# Patient Record
Sex: FEMALE | Race: WHITE | NOT HISPANIC OR LATINO | Employment: OTHER | URBAN - METROPOLITAN AREA
[De-identification: names, ages, dates, MRNs, and addresses within clinical notes are randomized per-mention and may not be internally consistent; named-entity substitution may affect disease eponyms.]

---

## 2017-03-01 ENCOUNTER — APPOINTMENT (OUTPATIENT)
Dept: LAB | Facility: CLINIC | Age: 82
End: 2017-03-01
Payer: MEDICARE

## 2017-03-01 DIAGNOSIS — E11.65 TYPE 2 DIABETES MELLITUS WITH HYPERGLYCEMIA (HCC): ICD-10-CM

## 2017-03-01 LAB
ALBUMIN SERPL BCP-MCNC: 3.7 G/DL (ref 3.5–5)
ALP SERPL-CCNC: 74 U/L (ref 46–116)
ALT SERPL W P-5'-P-CCNC: 23 U/L (ref 12–78)
ANION GAP SERPL CALCULATED.3IONS-SCNC: 8 MMOL/L (ref 4–13)
AST SERPL W P-5'-P-CCNC: 11 U/L (ref 5–45)
BASOPHILS # BLD AUTO: 0.03 THOUSANDS/ΜL (ref 0–0.1)
BASOPHILS NFR BLD AUTO: 0 % (ref 0–1)
BILIRUB SERPL-MCNC: 0.51 MG/DL (ref 0.2–1)
BUN SERPL-MCNC: 21 MG/DL (ref 5–25)
CALCIUM SERPL-MCNC: 9.2 MG/DL (ref 8.3–10.1)
CHLORIDE SERPL-SCNC: 102 MMOL/L (ref 100–108)
CO2 SERPL-SCNC: 25 MMOL/L (ref 21–32)
CREAT SERPL-MCNC: 0.89 MG/DL (ref 0.6–1.3)
EOSINOPHIL # BLD AUTO: 0.17 THOUSAND/ΜL (ref 0–0.61)
EOSINOPHIL NFR BLD AUTO: 2 % (ref 0–6)
ERYTHROCYTE [DISTWIDTH] IN BLOOD BY AUTOMATED COUNT: 14.4 % (ref 11.6–15.1)
EST. AVERAGE GLUCOSE BLD GHB EST-MCNC: 223 MG/DL
GFR SERPL CREATININE-BSD FRML MDRD: >60 ML/MIN/1.73SQ M
GLUCOSE SERPL-MCNC: 275 MG/DL (ref 65–140)
HBA1C MFR BLD: 9.4 % (ref 4.2–6.3)
HCT VFR BLD AUTO: 40.3 % (ref 34.8–46.1)
HGB BLD-MCNC: 13.7 G/DL (ref 11.5–15.4)
LDLC SERPL DIRECT ASSAY-MCNC: 191 MG/DL (ref 0–100)
LYMPHOCYTES # BLD AUTO: 1.61 THOUSANDS/ΜL (ref 0.6–4.47)
LYMPHOCYTES NFR BLD AUTO: 22 % (ref 14–44)
MCH RBC QN AUTO: 29.8 PG (ref 26.8–34.3)
MCHC RBC AUTO-ENTMCNC: 34 G/DL (ref 31.4–37.4)
MCV RBC AUTO: 88 FL (ref 82–98)
MONOCYTES # BLD AUTO: 0.67 THOUSAND/ΜL (ref 0.17–1.22)
MONOCYTES NFR BLD AUTO: 9 % (ref 4–12)
NEUTROPHILS # BLD AUTO: 4.86 THOUSANDS/ΜL (ref 1.85–7.62)
NEUTS SEG NFR BLD AUTO: 67 % (ref 43–75)
NRBC BLD AUTO-RTO: 0 /100 WBCS
PLATELET # BLD AUTO: 224 THOUSANDS/UL (ref 149–390)
PMV BLD AUTO: 11.4 FL (ref 8.9–12.7)
POTASSIUM SERPL-SCNC: 4.1 MMOL/L (ref 3.5–5.3)
PROT SERPL-MCNC: 7.1 G/DL (ref 6.4–8.2)
RBC # BLD AUTO: 4.6 MILLION/UL (ref 3.81–5.12)
SODIUM SERPL-SCNC: 135 MMOL/L (ref 136–145)
T4 FREE SERPL-MCNC: 1.14 NG/DL (ref 0.76–1.46)
TRIGL SERPL-MCNC: 198 MG/DL
TSH SERPL DL<=0.05 MIU/L-ACNC: 4.95 UIU/ML (ref 0.36–3.74)
WBC # BLD AUTO: 7.36 THOUSAND/UL (ref 4.31–10.16)

## 2017-03-01 PROCEDURE — 84478 ASSAY OF TRIGLYCERIDES: CPT

## 2017-03-01 PROCEDURE — 36415 COLL VENOUS BLD VENIPUNCTURE: CPT

## 2017-03-01 PROCEDURE — 83721 ASSAY OF BLOOD LIPOPROTEIN: CPT

## 2017-03-01 PROCEDURE — 80053 COMPREHEN METABOLIC PANEL: CPT

## 2017-03-01 PROCEDURE — 85025 COMPLETE CBC W/AUTO DIFF WBC: CPT

## 2017-03-01 PROCEDURE — 84439 ASSAY OF FREE THYROXINE: CPT

## 2017-03-01 PROCEDURE — 83036 HEMOGLOBIN GLYCOSYLATED A1C: CPT

## 2017-03-01 PROCEDURE — 84443 ASSAY THYROID STIM HORMONE: CPT

## 2017-03-02 ENCOUNTER — TRANSCRIBE ORDERS (OUTPATIENT)
Dept: MRI IMAGING | Facility: CLINIC | Age: 82
End: 2017-03-02

## 2017-03-02 ENCOUNTER — APPOINTMENT (OUTPATIENT)
Dept: LAB | Facility: CLINIC | Age: 82
End: 2017-03-02
Payer: MEDICARE

## 2017-03-02 DIAGNOSIS — E11.00 UNCONTROLLED TYPE 2 DIABETES MELLITUS WITH HYPEROSMOLARITY WITHOUT COMA, WITHOUT LONG-TERM CURRENT USE OF INSULIN (HCC): Primary | ICD-10-CM

## 2017-03-02 LAB
BACTERIA UR QL AUTO: ABNORMAL /HPF
BILIRUB UR QL STRIP: NEGATIVE
CLARITY UR: CLEAR
COLOR UR: YELLOW
GLUCOSE UR STRIP-MCNC: NEGATIVE MG/DL
HGB UR QL STRIP.AUTO: NEGATIVE
HYALINE CASTS #/AREA URNS LPF: ABNORMAL /LPF
KETONES UR STRIP-MCNC: NEGATIVE MG/DL
LEUKOCYTE ESTERASE UR QL STRIP: ABNORMAL
NITRITE UR QL STRIP: POSITIVE
NON-SQ EPI CELLS URNS QL MICRO: ABNORMAL /HPF
PH UR STRIP.AUTO: 6 [PH] (ref 4.5–8)
PROT UR STRIP-MCNC: NEGATIVE MG/DL
RBC #/AREA URNS AUTO: ABNORMAL /HPF
SP GR UR STRIP.AUTO: 1.01 (ref 1–1.03)
UROBILINOGEN UR QL STRIP.AUTO: 0.2 E.U./DL
WBC #/AREA URNS AUTO: ABNORMAL /HPF

## 2017-03-02 PROCEDURE — 81001 URINALYSIS AUTO W/SCOPE: CPT | Performed by: INTERNAL MEDICINE

## 2017-03-03 ENCOUNTER — ALLSCRIPTS OFFICE VISIT (OUTPATIENT)
Dept: OTHER | Facility: OTHER | Age: 82
End: 2017-03-03

## 2017-04-25 ENCOUNTER — ALLSCRIPTS OFFICE VISIT (OUTPATIENT)
Dept: OTHER | Facility: OTHER | Age: 82
End: 2017-04-25

## 2017-04-25 ENCOUNTER — APPOINTMENT (OUTPATIENT)
Dept: LAB | Facility: CLINIC | Age: 82
End: 2017-04-25
Payer: MEDICARE

## 2017-04-25 DIAGNOSIS — E78.5 HYPERLIPIDEMIA: ICD-10-CM

## 2017-04-25 LAB
ANION GAP SERPL CALCULATED.3IONS-SCNC: 8 MMOL/L (ref 4–13)
BACTERIA UR QL AUTO: ABNORMAL /HPF
BASOPHILS # BLD AUTO: 0.04 THOUSANDS/ΜL (ref 0–0.1)
BASOPHILS NFR BLD AUTO: 0 % (ref 0–1)
BILIRUB UR QL STRIP: NEGATIVE
BUN SERPL-MCNC: 17 MG/DL (ref 5–25)
CALCIUM SERPL-MCNC: 9.7 MG/DL (ref 8.3–10.1)
CHLORIDE SERPL-SCNC: 101 MMOL/L (ref 100–108)
CLARITY UR: ABNORMAL
CO2 SERPL-SCNC: 28 MMOL/L (ref 21–32)
COLOR UR: YELLOW
CREAT SERPL-MCNC: 0.91 MG/DL (ref 0.6–1.3)
EOSINOPHIL # BLD AUTO: 0.23 THOUSAND/ΜL (ref 0–0.61)
EOSINOPHIL NFR BLD AUTO: 2 % (ref 0–6)
ERYTHROCYTE [DISTWIDTH] IN BLOOD BY AUTOMATED COUNT: 14.5 % (ref 11.6–15.1)
GFR SERPL CREATININE-BSD FRML MDRD: 58.6 ML/MIN/1.73SQ M
GLUCOSE P FAST SERPL-MCNC: 236 MG/DL (ref 65–99)
GLUCOSE UR STRIP-MCNC: ABNORMAL MG/DL
HCT VFR BLD AUTO: 38.5 % (ref 34.8–46.1)
HGB BLD-MCNC: 12.3 G/DL (ref 11.5–15.4)
HGB UR QL STRIP.AUTO: NEGATIVE
HYALINE CASTS #/AREA URNS LPF: ABNORMAL /LPF
KETONES UR STRIP-MCNC: NEGATIVE MG/DL
LEUKOCYTE ESTERASE UR QL STRIP: ABNORMAL
LYMPHOCYTES # BLD AUTO: 2.22 THOUSANDS/ΜL (ref 0.6–4.47)
LYMPHOCYTES NFR BLD AUTO: 23 % (ref 14–44)
MCH RBC QN AUTO: 28.4 PG (ref 26.8–34.3)
MCHC RBC AUTO-ENTMCNC: 31.9 G/DL (ref 31.4–37.4)
MCV RBC AUTO: 89 FL (ref 82–98)
MONOCYTES # BLD AUTO: 0.86 THOUSAND/ΜL (ref 0.17–1.22)
MONOCYTES NFR BLD AUTO: 9 % (ref 4–12)
NEUTROPHILS # BLD AUTO: 6.49 THOUSANDS/ΜL (ref 1.85–7.62)
NEUTS SEG NFR BLD AUTO: 66 % (ref 43–75)
NITRITE UR QL STRIP: NEGATIVE
NON-SQ EPI CELLS URNS QL MICRO: ABNORMAL /HPF
NRBC BLD AUTO-RTO: 0 /100 WBCS
PH UR STRIP.AUTO: 6.5 [PH] (ref 4.5–8)
PLATELET # BLD AUTO: 257 THOUSANDS/UL (ref 149–390)
PMV BLD AUTO: 11.6 FL (ref 8.9–12.7)
POTASSIUM SERPL-SCNC: 4.3 MMOL/L (ref 3.5–5.3)
PROT UR STRIP-MCNC: NEGATIVE MG/DL
RBC # BLD AUTO: 4.33 MILLION/UL (ref 3.81–5.12)
RBC #/AREA URNS AUTO: ABNORMAL /HPF
SODIUM SERPL-SCNC: 137 MMOL/L (ref 136–145)
SP GR UR STRIP.AUTO: 1.01 (ref 1–1.03)
TSH SERPL DL<=0.05 MIU/L-ACNC: 2.3 UIU/ML (ref 0.36–3.74)
UROBILINOGEN UR QL STRIP.AUTO: 0.2 E.U./DL
WBC # BLD AUTO: 9.86 THOUSAND/UL (ref 4.31–10.16)
WBC #/AREA URNS AUTO: ABNORMAL /HPF

## 2017-04-25 PROCEDURE — 85025 COMPLETE CBC W/AUTO DIFF WBC: CPT

## 2017-04-25 PROCEDURE — 80048 BASIC METABOLIC PNL TOTAL CA: CPT

## 2017-04-25 PROCEDURE — 36415 COLL VENOUS BLD VENIPUNCTURE: CPT

## 2017-04-25 PROCEDURE — 87086 URINE CULTURE/COLONY COUNT: CPT

## 2017-04-25 PROCEDURE — 84443 ASSAY THYROID STIM HORMONE: CPT

## 2017-04-25 PROCEDURE — 87186 SC STD MICRODIL/AGAR DIL: CPT

## 2017-04-25 PROCEDURE — 81001 URINALYSIS AUTO W/SCOPE: CPT

## 2017-04-25 PROCEDURE — 87077 CULTURE AEROBIC IDENTIFY: CPT

## 2017-04-27 LAB — BACTERIA UR CULT: NORMAL

## 2017-05-04 ENCOUNTER — GENERIC CONVERSION - ENCOUNTER (OUTPATIENT)
Dept: OTHER | Facility: OTHER | Age: 82
End: 2017-05-04

## 2017-06-22 ENCOUNTER — GENERIC CONVERSION - ENCOUNTER (OUTPATIENT)
Dept: OTHER | Facility: OTHER | Age: 82
End: 2017-06-22

## 2017-06-22 ENCOUNTER — ALLSCRIPTS OFFICE VISIT (OUTPATIENT)
Dept: OTHER | Facility: OTHER | Age: 82
End: 2017-06-22

## 2017-06-22 ENCOUNTER — APPOINTMENT (OUTPATIENT)
Dept: LAB | Facility: CLINIC | Age: 82
End: 2017-06-22
Payer: MEDICARE

## 2017-06-22 DIAGNOSIS — E11.65 TYPE 2 DIABETES MELLITUS WITH HYPERGLYCEMIA (HCC): ICD-10-CM

## 2017-06-22 LAB
ANION GAP SERPL CALCULATED.3IONS-SCNC: 9 MMOL/L (ref 4–13)
BUN SERPL-MCNC: 18 MG/DL (ref 5–25)
CALCIUM SERPL-MCNC: 9.4 MG/DL (ref 8.3–10.1)
CHLORIDE SERPL-SCNC: 103 MMOL/L (ref 100–108)
CO2 SERPL-SCNC: 25 MMOL/L (ref 21–32)
CREAT SERPL-MCNC: 0.84 MG/DL (ref 0.6–1.3)
EST. AVERAGE GLUCOSE BLD GHB EST-MCNC: 166 MG/DL
GFR SERPL CREATININE-BSD FRML MDRD: >60 ML/MIN/1.73SQ M
GLUCOSE P FAST SERPL-MCNC: 127 MG/DL (ref 65–99)
HBA1C MFR BLD: 7.4 % (ref 4.2–6.3)
POTASSIUM SERPL-SCNC: 3.8 MMOL/L (ref 3.5–5.3)
SODIUM SERPL-SCNC: 137 MMOL/L (ref 136–145)

## 2017-06-22 PROCEDURE — 80048 BASIC METABOLIC PNL TOTAL CA: CPT

## 2017-06-22 PROCEDURE — 83036 HEMOGLOBIN GLYCOSYLATED A1C: CPT

## 2017-06-22 PROCEDURE — 36415 COLL VENOUS BLD VENIPUNCTURE: CPT

## 2017-10-12 ENCOUNTER — GENERIC CONVERSION - ENCOUNTER (OUTPATIENT)
Dept: OTHER | Facility: OTHER | Age: 82
End: 2017-10-12

## 2017-10-12 ENCOUNTER — TRANSCRIBE ORDERS (OUTPATIENT)
Dept: LAB | Facility: CLINIC | Age: 82
End: 2017-10-12

## 2017-10-12 ENCOUNTER — APPOINTMENT (OUTPATIENT)
Dept: LAB | Facility: CLINIC | Age: 82
End: 2017-10-12
Payer: MEDICARE

## 2017-10-12 DIAGNOSIS — I10 ESSENTIAL (PRIMARY) HYPERTENSION: ICD-10-CM

## 2017-10-12 DIAGNOSIS — E11.65 TYPE 2 DIABETES MELLITUS WITH HYPERGLYCEMIA (HCC): ICD-10-CM

## 2017-10-12 LAB
ANION GAP SERPL CALCULATED.3IONS-SCNC: 8 MMOL/L (ref 4–13)
BUN SERPL-MCNC: 12 MG/DL (ref 5–25)
CALCIUM SERPL-MCNC: 9.6 MG/DL (ref 8.3–10.1)
CHLORIDE SERPL-SCNC: 104 MMOL/L (ref 100–108)
CO2 SERPL-SCNC: 27 MMOL/L (ref 21–32)
CREAT SERPL-MCNC: 1 MG/DL (ref 0.6–1.3)
EST. AVERAGE GLUCOSE BLD GHB EST-MCNC: 237 MG/DL
GFR SERPL CREATININE-BSD FRML MDRD: 51 ML/MIN/1.73SQ M
GLUCOSE P FAST SERPL-MCNC: 126 MG/DL (ref 65–99)
HBA1C MFR BLD HPLC: 9.9 %
HBA1C MFR BLD: 9.9 % (ref 4.2–6.3)
POTASSIUM SERPL-SCNC: 4.2 MMOL/L (ref 3.5–5.3)
SODIUM SERPL-SCNC: 139 MMOL/L (ref 136–145)

## 2017-10-12 PROCEDURE — 80048 BASIC METABOLIC PNL TOTAL CA: CPT

## 2017-10-12 PROCEDURE — 83036 HEMOGLOBIN GLYCOSYLATED A1C: CPT

## 2017-10-12 PROCEDURE — 36415 COLL VENOUS BLD VENIPUNCTURE: CPT

## 2018-01-10 NOTE — MISCELLANEOUS
To Whom It May Concern,    The above patient suffers from Alzheimer's dementia  Although physically well, she has mental deficits that make it impossible for her to handle her financial  affairs  My recommendation is that her son, Milan Sharma, be appointed her legal guardian, and be given responsibility for control of her financial affairs  Sincerely,          USAMA Triana  Electronically signed Yessica KU    Jan 29 2016  9:16AM EST

## 2018-01-10 NOTE — RESULT NOTES
Verified Results  (1) BASIC METABOLIC PROFILE [id number] 10:29AM [facility]   TW Order Number: [id number]     Test Name Result Flag Reference   SODIUM 139 mmol/L  136-145   POTASSIUM 4 2 mmol/L  3 5-5 3   CHLORIDE 104 mmol/L  100-108   CARBON DIOXIDE 27 mmol/L  21-32   ANION GAP (CALC) 8 mmol/L  4-13   BLOOD UREA NITROGEN 12 mg/dL  5-25   CREATININE 1 00 mg/dL  0 60-1 30   Standardized to IDMS reference method   CALCIUM 9 6 mg/dL  8 3-10 1   eGFR 51 ml/min/1 73sq m     National Kidney Disease Education Program recommendations are as follows:  GFR calculation is accurate only with a steady state creatinine  Chronic Kidney disease less than 60 ml/min/1 73 sq  meters  Kidney failure less than 15 ml/min/1 73 sq  meters  GLUCOSE FASTING 126 mg/dL H 65-99   Specimen collection should occur prior to Sulfasalazine administration due to the potential for falsely depressed results  Specimen collection should occur prior to Sulfapyridine administration due to the potential for falsely elevated results  (1) HEMOGLOBIN A1C 12Oct2017 10:29AM [facility]   TW Order Number: [id number]     Test Name Result Flag Reference   HEMOGLOBIN A1C 9 9 % H 4 2-6 3   EST  AVG   GLUCOSE 237 mg/dl

## 2018-01-13 VITALS
BODY MASS INDEX: 30.88 KG/M2 | SYSTOLIC BLOOD PRESSURE: 130 MMHG | HEART RATE: 88 BPM | DIASTOLIC BLOOD PRESSURE: 78 MMHG | WEIGHT: 158.13 LBS

## 2018-01-13 NOTE — PROGRESS NOTES
Assessment    1  Diabetes mellitus type 2, uncontrolled (250 02) (E11 65)   2  Hyperlipidemia (272 4) (E78 5)   3  Hypertension (401 9) (I10)   4  Dementia (294 20) (F03 90)    Plan  Dementia, Diabetes mellitus type 2, uncontrolled, Face lesion    · Donepezil HCl - 10 MG Oral Tablet; TAKE 1 TABLET DAILY    Discussion/Summary  Discussion Summary:   Regarding your specific issues:  Blood pressure and blood sugar are decent is not perfect, and given the dementia, and behavioral problems, I don't think we should push this any further  Memory loss is not as severe as we thought it would be but you should be taking donepezil 10 mg once a day, not 5 mg  Followup in August  Given the worsening dementia I do recommend that her son assume soon control of her financial affairs     History of Present Illness  HPI: This 26-year-old lady keeps disappearing from followup  She was brought in last week for reassessment; She is diabeticl  She takes insulin twice a day generally 50 units in the morning and 30 units in the evening of 7525 mix on metformin  Blood pressure is controlled and stable  Lipids are treated  Memory loss does not seem that severe; she is on Aricept 10 mg and her mitral cognitive assessment was 25  Blood glucose log showed many entries greater than 300 but hemoglobin A1c although high at 8 8% is down from a prior 9 8%  Blood pressure is okay but not great  Declining cognitive function seems to be       Hypertension (Follow-Up): The patient presents for follow-up of primary hypertension  The patient states she has been stable with her blood pressure control since the last visit  She has no comorbid illnesses  She has no significant interval events  Symptoms: The patient is currently asymptomatic  Associated symptoms include no headache  Blood pressure control has been good  Medications: the patient is adherent with her medication regimen  the patient complains of medication side effects  Hyperlipidemia (Follow-Up): The patient states her hyperlipidemia has been under good control since the last visit  Comorbid Illnesses: diabetes mellitus and hypertension  Symptoms: The patient is currently asymptomatic  Medications: the patient is adherent with her medication regimen  Diabetes Type II (Follow-Up): The patient states she has been stable with her Type II Diabetes control since the last visit  Comorbid Illnesses: hypertension and hyperlipidemia  She has no known diabetic complications  Interval Events: She was evaluated during a hospitalization  She presented with Change in mental status  The evaluation included Admitted to the hospital for DKA  Treatment included Treatment of DKA  She has no significant interval events  Symptoms:   Home monitoring: The patient checks her blood sugars regularly  Glycemic control has been poor  Medications: the patient is adherent with her medication regimen  She denies medication side effects  Additional History: Hemoglobin A1c 8 8%  Memory Loss: The patient is being seen for a routine clinic follow-up of memory loss  The history is reported by the patient  Symptoms:  awareness of the memory impairment    The patient presents with complaints of mild memory loss  Symptoms are improving  No associated symptoms are reported  Current treatment includes memory aids  By report, there is good compliance with treatment, good tolerance of treatment and poor symptom control  She was previously evaluated during a hospitalization  Previous presentation included confusion  Past evaluation has included head CT  Review of Systems  Complete-Female:   Constitutional: no fever and no chills  Eyes: eyesight problems  ENT: no nasal discharge  Cardiovascular: no chest pain and no palpitations  Respiratory: no cough  Gastrointestinal: no abdominal pain, no nausea, no vomiting and no diarrhea  Genitourinary: no dysuria     Musculoskeletal: arthralgias  Integumentary: no rashes and no itching  Neurological: no headache and no fainting  Psychiatric: anxiety and depression  Hematologic/Lymphatic: no tendency for easy bleeding and no tendency for easy bruising  Active Problems    1  Actinic keratosis (702 0) (L57 0)   2  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)   3  Dementia (294 20) (F03 90)   4  Diabetes mellitus type 2, uncontrolled (250 02) (E11 65)   5  Face lesion (709 9) (L98 9)   6  Flu vaccine need (V04 81) (Z23)   7  H/O nonmelanoma skin cancer (V10 83) (Z85 828)   8  Hyperlipidemia (272 4) (E78 5)   9  Hypertension (401 9) (I10)   10  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)   11  Need for influenza vaccination (V04 81) (Z23)   12  Seborrheic keratosis (702 19) (L82 1)   13  Skin condition screening (V82 0) (Z13 89)   14  Squamous cell cancer of skin of left cheek (173 32) (C44 329)   15  Syncope, unspecified syncope type (780 2) (R55)   16  Transition of care    Past Medical History    1  History of Congestive heart failure (428 0) (I50 9)   2  History of Encounter for screening mammogram for malignant neoplasm of breast (V76 12) (Z12 31)   3  H/O nonmelanoma skin cancer (V10 83) (E01 967)   4  History of aortic valve disorder (V12 59) (Z86 79)   5  History of dermatitis (V13 3) (Z87 2)   6  History of edema (V13 89) (Z87 898)   7  History of shortness of breath (V13 89) (S81 656)    Surgical History    1  History of Bladder Surgery   2  History of Breast Surgery Mastectomy   3  History of Cataract Surgery   4  History of Knee Replacement  Surgical History Reviewed: The surgical history was reviewed and updated today  Family History    1  No pertinent family history    2  No pertinent family history  Family History Reviewed: The family history was reviewed and updated today         Social History    · Denied: History of Alcohol Use (History)   · Denied: History of Drug Use   · Never a smoker   · Tobacco Non-user  Social History Reviewed: The social history was reviewed and updated today  Current Meds   1  BD Insulin Syr Ultrafine II 31G X 5/16" 0 5 ML Miscellaneous; Therapy: 72BAQ0120 to (Evaluate:30May2014) Recorded   2  Donepezil HCl - 10 MG Oral Tablet; TAKE 1 TABLET DAILY; Therapy: 00ZTE2062 to (Evaluate:95Wec7463)  Requested for: 61QQW9892; Last Rx:85Ptr7728 Ordered   3  FreeStyle Lancets Miscellaneous; TEST 3 TIMES A DAY  02; Therapy: 90GTR0699 to (Last Rx:28Zek3770)  Requested for: 32KCZ0952 Ordered   4  FreeStyle Lite Test In Vitro Strip; TEST 3 TIMES A DAY; Therapy: 96CJB6655 to (Last Rx:87Ica1441)  Requested for: 48SAW8982 Ordered   5  HumaLOG Mix 75/25 (75-25) 100 UNIT/ML Subcutaneous Suspension; 50 units in the morning, and   30 units before dinner; Therapy: 42MJX3387 to (Evaluate:12Jan2016); Last Rx:14Oct2015 Ordered   6  Letrozole 2 5 MG Oral Tablet; Take 1 tablet daily; Therapy: 70Rwr2401-(Evaluate:68Yzi2170) Recorded   7  Lipitor 80 MG Oral Tablet; Take 1 tablet daily  Requested for: 09Sep2015; Last Rx:09Sep2015   Ordered   8  Losartan Potassium-HCTZ 100-25 MG Oral Tablet; Take 1 tablet daily  Requested for: 09Sep2015; Last Rx:40Lhw6172 Ordered   9  MetFORMIN HCl - 1000 MG Oral Tablet; Take 1 tablet twice daily Recorded   10  Multivitamins Oral Capsule; Therapy: (Recorded:25Mar2014) to Recorded   11  Vasolex OINT; APPLY DAILY/PRN  Requested for: 73Gkr8282; Last Rx:17Axd4371 Ordered  Medication List Reviewed: The medication list was reviewed and updated today  Allergies    1  Nitrofurantoin Macrocrystal CAPS    Vitals  Vital Signs [Data Includes: Current Encounter]    Recorded: 16AOE0661 09:20AM Recorded: 76EXD9513 09:00AM   Heart Rate  80   Systolic 099 378   Diastolic 82 75   Height  5 ft    Weight  160 lb 6 oz   BMI Calculated  31 32   BSA Calculated  1 7     Physical Exam    Constitutional   General appearance: No acute distress, well appearing and well nourished      Eyes Conjunctiva and lids: No swelling, erythema or discharge  Pupils and irises: Equal, round and reactive to light  Ears, Nose, Mouth, and Throat   External inspection of ears and nose: Normal     Otoscopic examination: Tympanic membranes translucent with normal light reflex  Canals patent without erythema  Nasal mucosa, septum, and turbinates: Normal without edema or erythema  Oropharynx: Normal with no erythema, edema, exudate or lesions  Pulmonary   Respiratory effort: No increased work of breathing or signs of respiratory distress  Auscultation of lungs: Clear to auscultation  Cardiovascular   Auscultation of heart: Normal rate and rhythm, normal S1 and S2, without murmurs  Examination of extremities for edema and/or varicosities: Normal     Carotid pulses: Normal     Abdomen   Abdomen: Non-tender, no masses  Liver and spleen: No hepatomegaly or splenomegaly  Lymphatic   Palpation of lymph nodes in neck: No lymphadenopathy  Musculoskeletal   Gait and station: Normal     Digits and nails: Normal without clubbing or cyanosis  Inspection/palpation of joints, bones, and muscles: Normal     Skin   Skin and subcutaneous tissue: Normal without rashes or lesions  Neurologic   Cranial nerves: Cranial nerves 2-12 intact  Reflexes: 2+ and symmetric  Sensation: No sensory loss  Psychiatric   Orientation to person, place, and time: Normal     Mood and affect: Abnormal   Mood and Affect: appropriate mood and quiet          Results/Data  Encounter Results   *VB-Urinary Incontinence Screen (Dx V81 6 Screen for UI) 86CQA3082 12:00AM Lior Maravilla     Test Name Result Flag Reference   Urinary Incontinence Assessment 32URF9189         Signatures   Electronically signed by : USAMA Velasquez ; Jan 29 2016  9:20AM EST                       (Author)

## 2018-01-22 VITALS
TEMPERATURE: 97.6 F | HEART RATE: 103 BPM | WEIGHT: 153 LBS | DIASTOLIC BLOOD PRESSURE: 70 MMHG | OXYGEN SATURATION: 93 % | SYSTOLIC BLOOD PRESSURE: 110 MMHG | BODY MASS INDEX: 29.88 KG/M2

## 2018-01-22 VITALS
WEIGHT: 155.13 LBS | HEART RATE: 102 BPM | BODY MASS INDEX: 30.46 KG/M2 | OXYGEN SATURATION: 96 % | HEIGHT: 60 IN | SYSTOLIC BLOOD PRESSURE: 132 MMHG | DIASTOLIC BLOOD PRESSURE: 78 MMHG

## 2018-02-17 DIAGNOSIS — G30.1 LATE ONSET ALZHEIMER'S DISEASE WITH BEHAVIORAL DISTURBANCE (HCC): Primary | ICD-10-CM

## 2018-02-17 DIAGNOSIS — F02.81 LATE ONSET ALZHEIMER'S DISEASE WITH BEHAVIORAL DISTURBANCE (HCC): Primary | ICD-10-CM

## 2018-02-17 RX ORDER — DONEPEZIL HYDROCHLORIDE 10 MG/1
TABLET, FILM COATED ORAL
Qty: 90 TABLET | Refills: 3 | Status: SHIPPED | OUTPATIENT
Start: 2018-02-17 | End: 2018-03-08 | Stop reason: SDUPTHER

## 2018-03-08 DIAGNOSIS — E11.8 TYPE 2 DIABETES MELLITUS WITH COMPLICATION, WITH LONG-TERM CURRENT USE OF INSULIN (HCC): Primary | ICD-10-CM

## 2018-03-08 DIAGNOSIS — Z79.4 TYPE 2 DIABETES MELLITUS WITH COMPLICATION, WITH LONG-TERM CURRENT USE OF INSULIN (HCC): Primary | ICD-10-CM

## 2018-03-08 DIAGNOSIS — F02.81 LATE ONSET ALZHEIMER'S DISEASE WITH BEHAVIORAL DISTURBANCE (HCC): ICD-10-CM

## 2018-03-08 DIAGNOSIS — G30.1 LATE ONSET ALZHEIMER'S DISEASE WITH BEHAVIORAL DISTURBANCE (HCC): ICD-10-CM

## 2018-03-08 RX ORDER — DONEPEZIL HYDROCHLORIDE 10 MG/1
10 TABLET, FILM COATED ORAL DAILY
Qty: 90 TABLET | Refills: 0 | Status: SHIPPED | OUTPATIENT
Start: 2018-03-08 | End: 2018-06-05 | Stop reason: SDUPTHER

## 2018-03-31 DIAGNOSIS — E11.8 TYPE 2 DIABETES MELLITUS WITH COMPLICATION, WITH LONG-TERM CURRENT USE OF INSULIN (HCC): Primary | ICD-10-CM

## 2018-03-31 DIAGNOSIS — E11.8 TYPE 2 DIABETES MELLITUS WITH COMPLICATION, WITH LONG-TERM CURRENT USE OF INSULIN (HCC): ICD-10-CM

## 2018-03-31 DIAGNOSIS — Z79.4 TYPE 2 DIABETES MELLITUS WITH COMPLICATION, WITH LONG-TERM CURRENT USE OF INSULIN (HCC): Primary | ICD-10-CM

## 2018-03-31 DIAGNOSIS — Z79.4 TYPE 2 DIABETES MELLITUS WITH COMPLICATION, WITH LONG-TERM CURRENT USE OF INSULIN (HCC): ICD-10-CM

## 2018-04-02 RX ORDER — SYRINGE-NEEDLE,INSULIN,0.5 ML 31 GX5/16"
SYRINGE, EMPTY DISPOSABLE MISCELLANEOUS
Qty: 200 EACH | Refills: 2 | Status: SHIPPED | OUTPATIENT
Start: 2018-04-02 | End: 2019-09-11 | Stop reason: SDUPTHER

## 2018-04-30 DIAGNOSIS — Z79.4 TYPE 2 DIABETES MELLITUS WITH COMPLICATION, WITH LONG-TERM CURRENT USE OF INSULIN (HCC): ICD-10-CM

## 2018-04-30 DIAGNOSIS — E11.8 TYPE 2 DIABETES MELLITUS WITH COMPLICATION, WITH LONG-TERM CURRENT USE OF INSULIN (HCC): ICD-10-CM

## 2018-05-10 RX ORDER — LANCETS 28 GAUGE
EACH MISCELLANEOUS
COMMUNITY
Start: 2015-09-09

## 2018-05-10 RX ORDER — BLOOD SUGAR DIAGNOSTIC
STRIP MISCELLANEOUS
COMMUNITY
Start: 2013-08-09

## 2018-05-12 ENCOUNTER — OFFICE VISIT (OUTPATIENT)
Dept: INTERNAL MEDICINE CLINIC | Facility: CLINIC | Age: 83
End: 2018-05-12
Payer: MEDICARE

## 2018-05-12 ENCOUNTER — APPOINTMENT (OUTPATIENT)
Dept: LAB | Facility: CLINIC | Age: 83
End: 2018-05-12
Payer: MEDICARE

## 2018-05-12 VITALS
WEIGHT: 143 LBS | SYSTOLIC BLOOD PRESSURE: 140 MMHG | HEART RATE: 102 BPM | OXYGEN SATURATION: 97 % | RESPIRATION RATE: 18 BRPM | DIASTOLIC BLOOD PRESSURE: 90 MMHG | BODY MASS INDEX: 28.07 KG/M2 | HEIGHT: 60 IN

## 2018-05-12 DIAGNOSIS — Z79.4 TYPE 2 DIABETES MELLITUS WITHOUT COMPLICATION, WITH LONG-TERM CURRENT USE OF INSULIN (HCC): Primary | ICD-10-CM

## 2018-05-12 DIAGNOSIS — I50.32 CHRONIC DIASTOLIC CONGESTIVE HEART FAILURE (HCC): ICD-10-CM

## 2018-05-12 DIAGNOSIS — I35.9 AORTIC VALVE DISORDER: ICD-10-CM

## 2018-05-12 DIAGNOSIS — E11.9 TYPE 2 DIABETES MELLITUS WITHOUT COMPLICATION, WITH LONG-TERM CURRENT USE OF INSULIN (HCC): ICD-10-CM

## 2018-05-12 DIAGNOSIS — E11.9 TYPE 2 DIABETES MELLITUS WITHOUT COMPLICATION, WITH LONG-TERM CURRENT USE OF INSULIN (HCC): Primary | ICD-10-CM

## 2018-05-12 DIAGNOSIS — Z79.4 TYPE 2 DIABETES MELLITUS WITHOUT COMPLICATION, WITH LONG-TERM CURRENT USE OF INSULIN (HCC): ICD-10-CM

## 2018-05-12 PROBLEM — I50.9 CHF (CONGESTIVE HEART FAILURE) (HCC): Status: ACTIVE | Noted: 2018-05-12

## 2018-05-12 LAB
ALBUMIN SERPL BCP-MCNC: 3.7 G/DL (ref 3.5–5)
ALP SERPL-CCNC: 82 U/L (ref 46–116)
ALT SERPL W P-5'-P-CCNC: 22 U/L (ref 12–78)
ANION GAP SERPL CALCULATED.3IONS-SCNC: 8 MMOL/L (ref 4–13)
AST SERPL W P-5'-P-CCNC: 13 U/L (ref 5–45)
BACTERIA UR QL AUTO: ABNORMAL /HPF
BASOPHILS # BLD AUTO: 0.03 THOUSANDS/ΜL (ref 0–0.1)
BASOPHILS NFR BLD AUTO: 0 % (ref 0–1)
BILIRUB SERPL-MCNC: 0.57 MG/DL (ref 0.2–1)
BILIRUB UR QL STRIP: NEGATIVE
BUN SERPL-MCNC: 14 MG/DL (ref 5–25)
CALCIUM SERPL-MCNC: 9.4 MG/DL (ref 8.3–10.1)
CHLORIDE SERPL-SCNC: 96 MMOL/L (ref 100–108)
CHOLEST SERPL-MCNC: 266 MG/DL (ref 50–200)
CLARITY UR: CLEAR
CO2 SERPL-SCNC: 28 MMOL/L (ref 21–32)
COLOR UR: YELLOW
CREAT SERPL-MCNC: 0.94 MG/DL (ref 0.6–1.3)
CREAT UR-MCNC: 34.7 MG/DL
EOSINOPHIL # BLD AUTO: 0.12 THOUSAND/ΜL (ref 0–0.61)
EOSINOPHIL NFR BLD AUTO: 2 % (ref 0–6)
ERYTHROCYTE [DISTWIDTH] IN BLOOD BY AUTOMATED COUNT: 13.8 % (ref 11.6–15.1)
EST. AVERAGE GLUCOSE BLD GHB EST-MCNC: 255 MG/DL
GFR SERPL CREATININE-BSD FRML MDRD: 55 ML/MIN/1.73SQ M
GLUCOSE P FAST SERPL-MCNC: 382 MG/DL (ref 65–99)
GLUCOSE UR STRIP-MCNC: ABNORMAL MG/DL
HBA1C MFR BLD: 10.5 % (ref 4.2–6.3)
HCT VFR BLD AUTO: 43.1 % (ref 34.8–46.1)
HDLC SERPL-MCNC: 67 MG/DL (ref 40–60)
HGB BLD-MCNC: 13.5 G/DL (ref 11.5–15.4)
HGB UR QL STRIP.AUTO: ABNORMAL
HYALINE CASTS #/AREA URNS LPF: ABNORMAL /LPF
KETONES UR STRIP-MCNC: ABNORMAL MG/DL
LDLC SERPL CALC-MCNC: 147 MG/DL (ref 0–100)
LEUKOCYTE ESTERASE UR QL STRIP: ABNORMAL
LYMPHOCYTES # BLD AUTO: 1.41 THOUSANDS/ΜL (ref 0.6–4.47)
LYMPHOCYTES NFR BLD AUTO: 19 % (ref 14–44)
MCH RBC QN AUTO: 28.3 PG (ref 26.8–34.3)
MCHC RBC AUTO-ENTMCNC: 31.3 G/DL (ref 31.4–37.4)
MCV RBC AUTO: 90 FL (ref 82–98)
MICROALBUMIN UR-MCNC: 42 MG/L (ref 0–20)
MICROALBUMIN/CREAT 24H UR: 121 MG/G CREATININE (ref 0–30)
MONOCYTES # BLD AUTO: 0.6 THOUSAND/ΜL (ref 0.17–1.22)
MONOCYTES NFR BLD AUTO: 8 % (ref 4–12)
NEUTROPHILS # BLD AUTO: 5.16 THOUSANDS/ΜL (ref 1.85–7.62)
NEUTS SEG NFR BLD AUTO: 71 % (ref 43–75)
NITRITE UR QL STRIP: NEGATIVE
NON-SQ EPI CELLS URNS QL MICRO: ABNORMAL /HPF
NRBC BLD AUTO-RTO: 0 /100 WBCS
NT-PROBNP SERPL-MCNC: 166 PG/ML
PH UR STRIP.AUTO: 6.5 [PH] (ref 4.5–8)
PLATELET # BLD AUTO: 223 THOUSANDS/UL (ref 149–390)
PMV BLD AUTO: 12.6 FL (ref 8.9–12.7)
POTASSIUM SERPL-SCNC: 4.4 MMOL/L (ref 3.5–5.3)
PROT SERPL-MCNC: 7.3 G/DL (ref 6.4–8.2)
PROT UR STRIP-MCNC: ABNORMAL MG/DL
RBC # BLD AUTO: 4.77 MILLION/UL (ref 3.81–5.12)
RBC #/AREA URNS AUTO: ABNORMAL /HPF
SODIUM SERPL-SCNC: 132 MMOL/L (ref 136–145)
SP GR UR STRIP.AUTO: 1.03 (ref 1–1.03)
TRIGL SERPL-MCNC: 262 MG/DL
TSH SERPL DL<=0.05 MIU/L-ACNC: 2.61 UIU/ML (ref 0.36–3.74)
UROBILINOGEN UR QL STRIP.AUTO: 1 E.U./DL
WBC # BLD AUTO: 7.34 THOUSAND/UL (ref 4.31–10.16)
WBC #/AREA URNS AUTO: ABNORMAL /HPF

## 2018-05-12 PROCEDURE — 81001 URINALYSIS AUTO W/SCOPE: CPT | Performed by: INTERNAL MEDICINE

## 2018-05-12 PROCEDURE — 82043 UR ALBUMIN QUANTITATIVE: CPT | Performed by: INTERNAL MEDICINE

## 2018-05-12 PROCEDURE — 99214 OFFICE O/P EST MOD 30 MIN: CPT | Performed by: INTERNAL MEDICINE

## 2018-05-12 PROCEDURE — 36415 COLL VENOUS BLD VENIPUNCTURE: CPT

## 2018-05-12 PROCEDURE — 83036 HEMOGLOBIN GLYCOSYLATED A1C: CPT

## 2018-05-12 PROCEDURE — 82570 ASSAY OF URINE CREATININE: CPT | Performed by: INTERNAL MEDICINE

## 2018-05-12 PROCEDURE — 85025 COMPLETE CBC W/AUTO DIFF WBC: CPT

## 2018-05-12 PROCEDURE — 80061 LIPID PANEL: CPT

## 2018-05-12 PROCEDURE — 83880 ASSAY OF NATRIURETIC PEPTIDE: CPT

## 2018-05-12 PROCEDURE — 84443 ASSAY THYROID STIM HORMONE: CPT

## 2018-05-12 PROCEDURE — 80053 COMPREHEN METABOLIC PANEL: CPT

## 2018-05-12 NOTE — PROGRESS NOTES
Diabetic Foot Exam    Patient's shoes and socks removed  Right Foot/Ankle   Right Foot Inspection  Skin Exam: skin intact                            Sensory       Monofilament testing: intact  Vascular  Capillary refills: < 3 seconds  The right DP pulse is 1+  The right PT pulse is 0  Left Foot/Ankle  Left Foot Inspection  Skin Exam: skin intact                                         Sensory       Monofilament: intact  Vascular  Capillary refills: < 3 seconds  The left DP pulse is 1+  The left PT pulse is 0  Assign Risk Category:  No deformity present;  No loss of protective sensation; Weak pulses       Risk: 0

## 2018-05-12 NOTE — PATIENT INSTRUCTIONS
An 80year-old with moderate dementia looks well  Routine laboratory testing is requested  Call Tuesday to review these findings  If there are any changes that require changing metformin we will go over at them  Echocardiogram recommended for reassessment of aortic valve

## 2018-05-12 NOTE — PROGRESS NOTES
Assessment/Plan:       Diagnoses and all orders for this visit:    Type 2 diabetes mellitus without complication, with long-term current use of insulin ([facility])  -     CBC and differential; Future  -     Lipid Panel with Direct LDL reflex; Future  -     Comprehensive metabolic panel; Future  -     TSH, 3rd generation with T4 reflex; Future  -     HEMOGLOBIN A1C W/ EAG ESTIMATION; Future  -     Urinalysis with reflex to microscopic  -     Microalbumin / creatinine urine ratio    Chronic diastolic congestive heart failure ([facility] [address] )  -     Echo complete with contrast if indicated; Future  -     NT-BNP PRO; Future    Aortic valve disorder  -     Echo complete with contrast if indicated; Future  -     NT-BNP PRO; Future    Other orders  -     Insulin Syringe-Needle U-100 (B-D INS SYRINGE 0 5CC/31GX5/16) 31G X 5/16" 0 5 ML MISC; by Does not apply route  -     Lancets (FREESTYLE) lancets; by Does not apply route  -     glucose blood (FREESTYLE LITE) test strip; by In Vitro route 3 (three) times a day  -     insulin lispro protamine-insulin lispro (HUMALOG MIX 75/25) 100 units/mL; Inject under the skin  -     metFORMIN (GLUCOPHAGE) 1000 MG tablet; Take by mouth  -     HEMOGLOBIN A1C W/ EAG ESTIMATION              Subjective:      Patient ID: Cari Stanford is a 80 y o  female  An 80year-old    Problems include compensated chronic diastolic CHF and diabetes  Her mental status is moderate dementia  She lives with her son and is alone for several hours a day  She has a long track record of missing dose of medications and hemoglobin A1c has never been well controlled but this has not seemed to have any adverse effect    Completely asymptomatic  Review of systems from both patient and son is accurate I believe    Exam is normal             The following portions of the patient's history were reviewed and updated as appropriate:   She has a past medical history of Aortic valve disorder; CHF (congestive heart failure) ([facility] [address] ); Shortness of breath; and Syncope ,   does not have any pertinent problems on file  ,   has a past surgical history that includes Bladder surgery; Mastectomy (Right); Cataract extraction; and Total knee arthroplasty  ,  family history is not on file  ,   reports that she has never smoked  She has never used smokeless tobacco  She reports that she does not drink alcohol or use drugs  ,  is allergic to nitrofurantoin     Current Outpatient Prescriptions   Medication Sig Dispense Refill    B-D INS SYRINGE 0 5CC/31GX5/16 31G X 5/16" 0 5 ML MISC USE TWICE DAILY 200 each 2    donepezil (ARICEPT) 10 mg tablet Take 1 tablet (10 mg total) by mouth daily 90 tablet 0    glucose blood (FREESTYLE LITE) test strip by In Vitro route 3 (three) times a day      HUMALOG 100 UNIT/ML injection 50 UNITS IN THE MORNING 30 IN THE EVENING 10 mL 0    insulin lispro protamine-insulin lispro (HUMALOG MIX 75/25) 100 units/mL Inject under the skin      Insulin Syringe-Needle U-100 (B-D INS SYRINGE 0 5CC/31GX5/16) 31G X 5/16" 0 5 ML MISC by Does not apply route      Lancets (FREESTYLE) lancets by Does not apply route      metFORMIN (GLUCOPHAGE) 1000 MG tablet Take by mouth       No current facility-administered medications for this visit  Review of Systems   Constitutional: Negative for chills and fever  HENT: Negative for sore throat and trouble swallowing  Eyes: Negative for pain  Respiratory: Negative for cough, shortness of breath and wheezing  Gastrointestinal: Negative for abdominal pain, diarrhea, nausea and vomiting  Endocrine: Negative for cold intolerance and heat intolerance  Genitourinary: Negative for dysuria, frequency and pelvic pain  Musculoskeletal: Negative for arthralgias and joint swelling  Skin: Negative for rash and wound  Allergic/Immunologic: Negative for immunocompromised state  Neurological: Negative for dizziness, seizures, syncope and headaches     Psychiatric/Behavioral: Negative for dysphoric mood  The patient is not nervous/anxious  Objective:  Vitals:    05/12/18 0910   BP: 140/90   Pulse: 102   Resp: 18   SpO2: 97%      Physical Exam   Constitutional: She is oriented to person, place, and time  She appears well-developed and well-nourished  An 60-year-old who appears her stated age  No distress at all  HENT:   Head: Normocephalic and atraumatic  Eyes: EOM are normal  Pupils are equal, round, and reactive to light  Neck: Normal range of motion  Neck supple  No tracheal deviation present  No thyromegaly present  Cardiovascular: Normal rate, regular rhythm and normal heart sounds  Exam reveals no gallop  No murmur heard  Pulmonary/Chest: No respiratory distress  She has no wheezes  She has no rales  Abdominal: Soft  Bowel sounds are normal  There is no tenderness  Musculoskeletal: Normal range of motion  She exhibits no tenderness or deformity  Neurological: She is alert and oriented to person, place, and time  Coordination normal    Skin: Skin is warm  Psychiatric: She has a normal mood and affect  Cognition and memory are impaired  She expresses inappropriate judgment

## 2018-05-14 ENCOUNTER — TELEPHONE (OUTPATIENT)
Dept: INTERNAL MEDICINE CLINIC | Facility: CLINIC | Age: 83
End: 2018-05-14

## 2018-05-15 ENCOUNTER — TELEPHONE (OUTPATIENT)
Dept: INTERNAL MEDICINE CLINIC | Facility: CLINIC | Age: 83
End: 2018-05-15

## 2018-05-15 DIAGNOSIS — E11.9 DIABETES MELLITUS TYPE 2 IN NONOBESE (HCC): Primary | ICD-10-CM

## 2018-05-15 RX ORDER — GLIMEPIRIDE 1 MG/1
1 TABLET ORAL
Qty: 30 TABLET | Refills: 5 | Status: SHIPPED | OUTPATIENT
Start: 2018-05-15 | End: 2018-10-13 | Stop reason: SDUPTHER

## 2018-05-15 NOTE — TELEPHONE ENCOUNTER
----- Message from Rosibel Hassan MD sent at 5/15/2018  8:02 AM EDT -----  Hemoglobin A1c even higher  Up to 10 5  Begin glimepiride 1 mg daily  Come back for recheck in about 6 weeks

## 2018-06-05 DIAGNOSIS — F02.81 LATE ONSET ALZHEIMER'S DISEASE WITH BEHAVIORAL DISTURBANCE (HCC): ICD-10-CM

## 2018-06-05 DIAGNOSIS — G30.1 LATE ONSET ALZHEIMER'S DISEASE WITH BEHAVIORAL DISTURBANCE (HCC): ICD-10-CM

## 2018-06-05 RX ORDER — DONEPEZIL HYDROCHLORIDE 10 MG/1
10 TABLET, FILM COATED ORAL DAILY
Qty: 90 TABLET | Refills: 0 | Status: SHIPPED | OUTPATIENT
Start: 2018-06-05 | End: 2018-09-05 | Stop reason: SDUPTHER

## 2018-06-15 DIAGNOSIS — Z79.4 TYPE 2 DIABETES MELLITUS WITH COMPLICATION, WITH LONG-TERM CURRENT USE OF INSULIN (HCC): ICD-10-CM

## 2018-06-15 DIAGNOSIS — E11.8 TYPE 2 DIABETES MELLITUS WITH COMPLICATION, WITH LONG-TERM CURRENT USE OF INSULIN (HCC): ICD-10-CM

## 2018-07-04 DIAGNOSIS — E11.8 TYPE 2 DIABETES MELLITUS WITH COMPLICATION, WITH LONG-TERM CURRENT USE OF INSULIN (HCC): ICD-10-CM

## 2018-07-04 DIAGNOSIS — Z79.4 TYPE 2 DIABETES MELLITUS WITH COMPLICATION, WITH LONG-TERM CURRENT USE OF INSULIN (HCC): ICD-10-CM

## 2018-07-30 DIAGNOSIS — E11.9 TYPE 2 DIABETES MELLITUS WITHOUT COMPLICATION, WITHOUT LONG-TERM CURRENT USE OF INSULIN (HCC): Primary | ICD-10-CM

## 2018-08-02 DIAGNOSIS — Z79.4 TYPE 2 DIABETES MELLITUS WITH COMPLICATION, WITH LONG-TERM CURRENT USE OF INSULIN (HCC): ICD-10-CM

## 2018-08-02 DIAGNOSIS — E11.8 TYPE 2 DIABETES MELLITUS WITH COMPLICATION, WITH LONG-TERM CURRENT USE OF INSULIN (HCC): ICD-10-CM

## 2018-08-29 DIAGNOSIS — Z79.4 TYPE 2 DIABETES MELLITUS WITH COMPLICATION, WITH LONG-TERM CURRENT USE OF INSULIN (HCC): ICD-10-CM

## 2018-08-29 DIAGNOSIS — E11.8 TYPE 2 DIABETES MELLITUS WITH COMPLICATION, WITH LONG-TERM CURRENT USE OF INSULIN (HCC): ICD-10-CM

## 2018-09-04 DIAGNOSIS — E11.8 TYPE 2 DIABETES MELLITUS WITH COMPLICATION, UNSPECIFIED WHETHER LONG TERM INSULIN USE: Primary | ICD-10-CM

## 2018-09-05 DIAGNOSIS — G30.1 LATE ONSET ALZHEIMER'S DISEASE WITH BEHAVIORAL DISTURBANCE (HCC): ICD-10-CM

## 2018-09-05 DIAGNOSIS — F02.81 LATE ONSET ALZHEIMER'S DISEASE WITH BEHAVIORAL DISTURBANCE (HCC): ICD-10-CM

## 2018-09-05 RX ORDER — BLOOD-GLUCOSE METER
KIT MISCELLANEOUS
Qty: 100 EACH | Refills: 3 | Status: SHIPPED | OUTPATIENT
Start: 2018-09-05 | End: 2022-05-12 | Stop reason: HOSPADM

## 2018-09-05 RX ORDER — DONEPEZIL HYDROCHLORIDE 10 MG/1
TABLET, FILM COATED ORAL
Qty: 90 TABLET | Refills: 0 | Status: SHIPPED | OUTPATIENT
Start: 2018-09-05 | End: 2019-10-25 | Stop reason: SDUPTHER

## 2018-09-19 ENCOUNTER — OFFICE VISIT (OUTPATIENT)
Dept: INTERNAL MEDICINE CLINIC | Facility: CLINIC | Age: 83
End: 2018-09-19
Payer: MEDICARE

## 2018-09-19 VITALS
OXYGEN SATURATION: 95 % | HEIGHT: 60 IN | HEART RATE: 116 BPM | WEIGHT: 132 LBS | DIASTOLIC BLOOD PRESSURE: 72 MMHG | BODY MASS INDEX: 25.91 KG/M2 | SYSTOLIC BLOOD PRESSURE: 134 MMHG

## 2018-09-19 DIAGNOSIS — Z13.5 SCREENING FOR DIABETIC RETINOPATHY: Primary | ICD-10-CM

## 2018-09-19 DIAGNOSIS — Z79.4 TYPE 2 DIABETES MELLITUS WITHOUT COMPLICATION, WITH LONG-TERM CURRENT USE OF INSULIN (HCC): Primary | ICD-10-CM

## 2018-09-19 DIAGNOSIS — Z23 NEED FOR IMMUNIZATION AGAINST INFLUENZA: ICD-10-CM

## 2018-09-19 DIAGNOSIS — L89.311 DECUBITUS ULCER OF RIGHT BUTTOCK, STAGE 1: ICD-10-CM

## 2018-09-19 DIAGNOSIS — L89.892 PRESSURE ULCER OF TOE OF RIGHT FOOT, STAGE 2 (HCC): ICD-10-CM

## 2018-09-19 DIAGNOSIS — L89.892 PRESSURE ULCER OF TOE OF LEFT FOOT, STAGE 2 (HCC): ICD-10-CM

## 2018-09-19 DIAGNOSIS — I35.9 AORTIC VALVE DISORDER: ICD-10-CM

## 2018-09-19 DIAGNOSIS — E11.9 TYPE 2 DIABETES MELLITUS WITHOUT COMPLICATION, WITH LONG-TERM CURRENT USE OF INSULIN (HCC): Primary | ICD-10-CM

## 2018-09-19 DIAGNOSIS — I50.32 CHRONIC DIASTOLIC CONGESTIVE HEART FAILURE (HCC): ICD-10-CM

## 2018-09-19 LAB
RIGHT EYE DIABETIC RETINOPATHY: NORMAL
RIGHT EYE IMAGE QUALITY: NORMAL
SEVERITY (EYE EXAM): NORMAL

## 2018-09-19 PROCEDURE — 90662 IIV NO PRSV INCREASED AG IM: CPT | Performed by: INTERNAL MEDICINE

## 2018-09-19 PROCEDURE — G0439 PPPS, SUBSEQ VISIT: HCPCS | Performed by: INTERNAL MEDICINE

## 2018-09-19 PROCEDURE — G0008 ADMIN INFLUENZA VIRUS VAC: HCPCS | Performed by: INTERNAL MEDICINE

## 2018-09-19 PROCEDURE — 99214 OFFICE O/P EST MOD 30 MIN: CPT | Performed by: INTERNAL MEDICINE

## 2018-09-19 NOTE — PROGRESS NOTES
Assessment/Plan:       Diagnoses and all orders for this visit:    Type 2 diabetes mellitus without complication, with long-term current use of insulin (HCC)    Chronic diastolic congestive heart failure (HCC)    Aortic valve disorder          There are no Patient Instructions on file for this visit  Subjective:      Patient ID: Asim Lynch is a 80 y o  female  An 80year-old    Here for a so called wellness visit which is of course completely irrelevant because she has aged out of all the wellness requirements  Chronic diagnoses are:   compensated chronic diastolic CHF    Diabetes type 2  Control has never been good  Most recent hemoglobin A1c was over 10  Moderate Alzheimer's dementia  She lives with her son and is alone for several hours a day  She has a long track record of missing dose of medications and hemoglobin A1c has never been well controlled but this has not seemed to have any adverse effect  Completely asymptomatic  Review of systems from both patient and son is accurate I believe    Exam is normal             The following portions of the patient's history were reviewed and updated as appropriate:   She has a past medical history of Aortic valve disorder; CHF (congestive heart failure) ([address] ); Shortness of breath; and Syncope ,   does not have any pertinent problems on file  ,   has a past surgical history that includes Bladder surgery; Mastectomy (Right); Cataract extraction; and Total knee arthroplasty  ,  family history is not on file  ,   reports that she has never smoked  She has never used smokeless tobacco  She reports that she does not drink alcohol or use drugs  ,  is allergic to nitrofurantoin     Current Outpatient Prescriptions   Medication Sig Dispense Refill    B-D INS SYRINGE 0 5CC/31GX5/16 31G X 5/16" 0 5 ML MISC USE TWICE DAILY 200 each 2    donepezil (ARICEPT) 10 mg tablet TAKE 1 TABLET BY MOUTH EVERY DAY 90 tablet 0    FREESTYLE LITE test strip TEST 3 TIMES A  each 3    glimepiride (AMARYL) 1 mg tablet Take 1 tablet (1 mg total) by mouth daily with breakfast 30 tablet 5    HUMALOG 100 UNIT/ML injection INJECT 50 UNITS IN THE MORNING 10 mL 0    insulin lispro protamine-insulin lispro (HUMALOG MIX 75/25) 100 units/mL Inject under the skin      Insulin Syringe-Needle U-100 (B-D INS SYRINGE 0 5CC/31GX5/16) 31G X 5/16" 0 5 ML MISC by Does not apply route      Lancets (FREESTYLE) lancets by Does not apply route      metFORMIN (GLUCOPHAGE) 1000 MG tablet TAKE 1 TABLET TWICE A DAY WITH AM AND WITH PM MEALS 180 tablet 3     No current facility-administered medications for this visit  Review of Systems   Constitutional: Negative for chills and fever  HENT: Negative for sore throat and trouble swallowing  Eyes: Negative for pain  Respiratory: Negative for cough, shortness of breath and wheezing  Gastrointestinal: Negative for abdominal pain, diarrhea, nausea and vomiting  Endocrine: Negative for cold intolerance and heat intolerance  Genitourinary: Negative for dysuria, frequency and pelvic pain  Musculoskeletal: Negative for arthralgias and joint swelling  Skin: Negative for rash and wound  Allergic/Immunologic: Negative for immunocompromised state  Neurological: Negative for dizziness, seizures, syncope and headaches  Psychiatric/Behavioral: Negative for dysphoric mood  The patient is not nervous/anxious  Objective:  Vitals:    09/19/18 0858   BP: 134/72   Pulse: (!) 116   SpO2: 95%      Physical Exam   Constitutional: She is oriented to person, place, and time  She appears well-developed and well-nourished  An 55-year-old who appears her stated age  No distress at all  HENT:   Head: Normocephalic and atraumatic  Eyes: EOM are normal  Pupils are equal, round, and reactive to light  Neck: Normal range of motion  Neck supple  No tracheal deviation present  No thyromegaly present     Cardiovascular: Normal rate, regular rhythm and normal heart sounds  Exam reveals no gallop  No murmur heard  Pulmonary/Chest: No respiratory distress  She has no wheezes  She has no rales  Abdominal: Soft  Bowel sounds are normal  There is no tenderness  Musculoskeletal: Normal range of motion  She exhibits no tenderness or deformity  Neurological: She is alert and oriented to person, place, and time  Coordination normal    Skin: Skin is warm  Psychiatric: She has a normal mood and affect  Cognition and memory are impaired  She expresses inappropriate judgment

## 2018-09-19 NOTE — PROGRESS NOTES
Assessment/Plan:       Diagnoses and all orders for this visit:    Type 2 diabetes mellitus without complication, with long-term current use of insulin (HCC)  -     CBC and differential; Future  -     Comprehensive metabolic panel; Future  -     TSH, 3rd generation with Free T4 reflex; Future  -     Hemoglobin A1C; Future    Chronic diastolic congestive heart failure (HCC)  -     CBC and differential; Future  -     Comprehensive metabolic panel; Future  -     TSH, 3rd generation with Free T4 reflex; Future  -     Hemoglobin A1C; Future    Aortic valve disorder  -     Echo complete with contrast if indicated; Future    Need for immunization against influenza  -     influenza vaccine, [id number], high-dose, PF 0 5 mL, for patients 65 yr+ (FLUZONE HIGH-DOSE)    Pressure ulcer of toe of right foot, stage 2    Pressure ulcer of toe of left foot, stage 2    Decubitus ulcer of right buttock, stage 1          Patient Instructions    The patient is evaluated  She has moderate Alzheimer's dementia diabetes and compensated CHF  Grade 2 ulcers noted both pinky toes with loss of skin and down to subcutaneous layer but fortunately no secondary infection  Apparently, the patient wears slipped own shoes with no socks  Recommendation is to keep covered with a Band-Aid, to wear socks, to get a type of shoe with a slightly larger shoe box    A grade 1 decubitus ulcer noted in the glueeal cleft on the right  There is some buildup brought tissue but no actual volume loss  She also has redness on the left gluteal cleft which qualifies as grade 1 but there is no actual   Tissue disruption; just discoloration     Barrier creams are recommended for these areas; also a doughnut type cushion  The barrier cream is called  Medline Remedy Hydroguard      recheck A1c   Echocardiogram should be done to reassess valvular dysfunction  4-5 months follow-up            Subjective:      Patient ID: Miriam Arreaga is a 80 y o  female  HPI    The following portions of the patient's history were reviewed and updated as appropriate:   She has a past medical history of Aortic valve disorder; CHF (congestive heart failure) ([address] ); Shortness of breath; and Syncope ,   does not have any pertinent problems on file  ,   has a past surgical history that includes Bladder surgery; Mastectomy (Right); Cataract extraction; and Total knee arthroplasty  ,  family history is not on file  ,   reports that she has never smoked  She has never used smokeless tobacco  She reports that she does not drink alcohol or use drugs  ,  is allergic to nitrofurantoin     Current Outpatient Prescriptions   Medication Sig Dispense Refill    B-D INS SYRINGE 0 5CC/31GX5/16 31G X 5/16" 0 5 ML MISC USE TWICE DAILY 200 each 2    donepezil (ARICEPT) 10 mg tablet TAKE 1 TABLET BY MOUTH EVERY DAY 90 tablet 0    FREESTYLE LITE test strip TEST 3 TIMES A  each 3    glimepiride (AMARYL) 1 mg tablet Take 1 tablet (1 mg total) by mouth daily with breakfast 30 tablet 5    HUMALOG 100 UNIT/ML injection INJECT 50 UNITS IN THE MORNING 10 mL 0    insulin lispro protamine-insulin lispro (HUMALOG MIX 75/25) 100 units/mL Inject under the skin      Insulin Syringe-Needle U-100 (B-D INS SYRINGE 0 5CC/31GX5/16) 31G X 5/16" 0 5 ML MISC by Does not apply route      Lancets (FREESTYLE) lancets by Does not apply route      metFORMIN (GLUCOPHAGE) 1000 MG tablet TAKE 1 TABLET TWICE A DAY WITH AM AND WITH PM MEALS 180 tablet 3     No current facility-administered medications for this visit  Review of Systems   Constitutional: Negative for chills and fever  HENT: Negative for sore throat and trouble swallowing  Eyes: Negative for pain  Respiratory: Negative for cough, shortness of breath and wheezing  Cardiovascular: Negative for chest pain and leg swelling  Gastrointestinal: Negative for abdominal pain, diarrhea, nausea and vomiting     Endocrine: Negative for cold intolerance and heat intolerance  Genitourinary: Negative for dysuria, frequency and pelvic pain  Musculoskeletal: Negative for arthralgias and joint swelling  Skin: Positive for wound  Negative for rash  Allergic/Immunologic: Negative for immunocompromised state  Neurological: Negative for dizziness, seizures, syncope and headaches  Psychiatric/Behavioral: Positive for confusion  Negative for dysphoric mood  The patient is not nervous/anxious  Objective:  Vitals:    09/19/18 0858   BP: 134/72   Pulse: (!) 116   SpO2: 95%      Physical Exam   Constitutional: She is oriented to person, place, and time  No distress  An elderly female patient not in distress; appears stated age  Speaking clearly, can carry on basic conversation but clearly demented  Does not remember her medical history  Cannot recount treatments or prior diagnoses  HENT:   Head: Normocephalic and atraumatic  Eyes: EOM are normal  Pupils are equal, round, and reactive to light  Neck: Normal range of motion  Neck supple  No tracheal deviation present  No thyromegaly present  Cardiovascular: Normal rate and regular rhythm  Exam reveals no gallop  Murmur heard  Decrescendo systolic murmur is present with a grade of 3/6   Aortic decrescendo murmur heard best precordial E 2nd intercostal spaces both right and left  Pulmonary/Chest: No respiratory distress  She has no wheezes  She has no rales  Right breast surgically absent   Abdominal: Soft  Bowel sounds are normal  There is no tenderness  Musculoskeletal: Normal range of motion  She exhibits no tenderness or deformity  Neurological: She is alert and oriented to person, place, and time  Coordination normal    Skin: Skin is warm  2 mm grade 2 decubitus ulcers lateral aspect both pinky toes overlying the proximal interphalangeal joints without circumscribed being erythema and without exudate      Grade 1 pressure lesions both gluteal clefts, the right side with some thickening of skin but without volume loss and exposure of underlying tissue  Total surface area approximately 12 subcutaneously cm  Again, no sign of secondary infection  Psychiatric: She has a normal mood and affect   Judgment normal

## 2018-09-19 NOTE — PROGRESS NOTES
Assessment and Plan:    Problem List Items Addressed This Visit     None        Health Maintenance Due   Topic Date Due    Depression Screening PHQ  08/30/1930   Nelianeel Saleem Medicare Annual Wellness Visit (AWV)  08/30/1930    DM Eye Exam  08/30/1940    DTaP,Tdap,and Td Vaccines (1 - Tdap) 08/30/1951    Fall Risk  08/30/1995    Urinary Incontinence Screening  08/30/1995    Pneumococcal PPSV23/PCV13 65+ Years / Low and Medium Risk (1 of 2 - PCV13) 08/30/1995    INFLUENZA VACCINE  09/01/2018         HPI:  Jackelyn Stewart is a 80 y o  female here for her Subsequent Wellness Visit  Patient Active Problem List   Diagnosis    Type 2 diabetes mellitus without complication, with long-term current use of insulin ([facility] )    CHF (congestive heart failure) ([facility] [facility] )    Aortic valve disorder     Past Medical History:   Diagnosis Date    Aortic valve disorder     [facility]     Shortness of breath     Syncope      Past Surgical History:   Procedure Laterality Date    BLADDER SURGERY      CATARACT EXTRACTION      MASTECTOMY Right     last assessed 7/29/14    TOTAL KNEE ARTHROPLASTY       No family history on file    History   Smoking Status    Never Smoker   Smokeless Tobacco    Never Used     Comment: tobacco non user     History   Alcohol Use No      History   Drug Use No       Current Outpatient Prescriptions   Medication Sig Dispense Refill    B-D INS SYRINGE 0 5CC/31GX5/16 31G X 5/16" 0 5 ML MISC USE TWICE DAILY 200 each 2    donepezil (ARICEPT) 10 mg tablet TAKE 1 TABLET BY MOUTH EVERY DAY 90 tablet 0    FREESTYLE LITE test strip TEST 3 TIMES A  each 3    glimepiride (AMARYL) 1 mg tablet Take 1 tablet (1 mg total) by mouth daily with breakfast 30 tablet 5    HUMALOG 100 UNIT/ML injection INJECT 50 UNITS IN THE MORNING 10 mL 0    insulin lispro protamine-insulin lispro (HUMALOG MIX 75/25) 100 units/mL Inject under the skin      Insulin Syringe-Needle U-100 (B-D INS SYRINGE 0 5CC/31GX5/16) 31G X 5/16" 0 5 ML MISC by Does not apply route      Lancets (FREESTYLE) lancets by Does not apply route      metFORMIN (GLUCOPHAGE) 1000 MG tablet TAKE 1 TABLET TWICE A DAY WITH AM AND WITH PM MEALS 180 tablet 3     No current facility-administered medications for this visit  Allergies   Allergen Reactions    Nitrofurantoin      Immunization History   Administered Date(s) Administered    Influenza Split High Dose Preservative Free IM 10/01/2014, 01/22/2016, 10/12/2017    Pneumococcal Polysaccharide PPV23 08/30/1930    Tdap 08/30/1930    Zoster 08/30/1930       Patient Care Team:  Cori David MD as PCP - General  Ej Hanks MD    Medicare Screening Tests and Risk Assessments:      Health Risk Assessment:  Patient rates overall health as good  Patient feels that their physical health rating is Same  Eyesight was rated as Same  Hearing was rated as Slightly worse  Patient feels that their emotional and mental health rating is Same  Pain experienced by patient in the last 7 days has been None  Emotional/Mental Health:    PHQ-9 Depression Screening:    Frequency of the following problems over the past two weeks:      1  Little interest or pleasure in doing things: 0 - not at all      2  Feeling down, depressed, or hopeless: 0 - not at all  PHQ-2 Score: 0          Broken Bones/Falls: Fall Risk Assessment:    In the past year, patient has experienced: History of falling in past year     Number of falls: greater than 3  Patient does not feel she is unsteady standing  Patient is not taking medication that can cause feelings of lightheadedness or tiredness  Patient often has no need to rush to the toilet  Chronic conditions that may contribute to falls diabetes  Immunizations:  Patient has had a flu vaccination within the last year  Patient has not received a pneumonia shot  Patient has not received a shingles shot  Patient has not received tetanus/diphtheria shot       Home Safety:  Patient has trouble with stairs inside or outside of their home  Patient currently reports that there are no safety hazards present in home, no working smoke alarms, no working carbon monoxide detectors  Preventative Screenings:   Breast cancer screening performed, no colon cancer screen completed, cholesterol screen completed, no glaucoma eye exam completed    Nutrition:  Current diet: Regular with servings of the following:    Medications:  Patient is not currently taking any over-the-counter supplements  Patient is not able to manage medications  Lifestyle Choices:  Patient reports no tobacco use  Patient has not smoked or used tobacco in the past   Patient reports no alcohol use  Patient does not drive a vehicle  Patient wears seat belt  Activities of Daily Living:  Can get out of bed by his or her self, able to dress self, unable to make own meals, unable to do own shopping, able to bathe self, unable to do laundry/housekeeping, unable to manage own money and other related tasks    Previous Hospitalizations:  No hospitalization or ED visit in past 12 months        Advanced Directives:  Patient has decided on a power of   Patient has spoken to designated power of   Patient has completed advanced directive          Preventative Screening/Counseling:      Cardiovascular:      General: Screening Not Indicated          Diabetes:      General: Screening Not Indicated          Colorectal Cancer:      General: Screening Not Indicated          Breast Cancer:      General: Screening Not Indicated and Screening Current          Cervical Cancer:      General: Screening Not Indicated          Osteoporosis:      General: Screening Not Indicated          AAA:      General: Screening Not Indicated          Glaucoma:      General: Screening Current          HIV:      General: Screening Not Indicated          Hepatitis C:      General: Screening Not Indicated        Advanced Directives:   Patient has living will for healthcare, has durable POA for healthcare, patient has an advanced directive  Information on ACP and/or AD provided  No 5 wishes given  No end of life assessment reviewed with patient  Provider agrees with end of life decisions        Immunizations:      Influenza: Influenza Recommended Annually

## 2018-09-19 NOTE — PATIENT INSTRUCTIONS
The patient is evaluated  She has moderate Alzheimer's dementia diabetes and compensated CHF  Grade 2 ulcers noted both pinky toes with loss of skin and down to subcutaneous layer but fortunately no secondary infection  Apparently, the patient wears slipped own shoes with no socks  Recommendation is to keep covered with a Band-Aid, to wear socks, to get a type of shoe with a slightly larger shoe box    A grade 1 decubitus ulcer noted in the glueeal cleft on the right  There is some buildup brought tissue but no actual volume loss  She also has redness on the left gluteal cleft which qualifies as grade 1 but there is no actual   Tissue disruption; just discoloration     Barrier creams are recommended for these areas; also a doughnut type cushion  The barrier cream is called  [employer] Remedy Hydroguard      recheck A1c   Echocardiogram should be done to reassess valvular dysfunction      4-5 months follow-up

## 2018-09-21 DIAGNOSIS — Z79.4 TYPE 2 DIABETES MELLITUS WITH COMPLICATION, WITH LONG-TERM CURRENT USE OF INSULIN (HCC): ICD-10-CM

## 2018-09-21 DIAGNOSIS — E11.8 TYPE 2 DIABETES MELLITUS WITH COMPLICATION, WITH LONG-TERM CURRENT USE OF INSULIN (HCC): ICD-10-CM

## 2018-09-24 ENCOUNTER — TELEPHONE (OUTPATIENT)
Dept: INTERNAL MEDICINE CLINIC | Facility: CLINIC | Age: 83
End: 2018-09-24

## 2018-09-24 NOTE — TELEPHONE ENCOUNTER
----- Message from Dominique Renner MD sent at 9/24/2018  8:52 AM EDT -----  Diabetic eye screen shows diabetic retinopathy and she should go to an ophthalmologist   This message has to go to her son because she has dementia

## 2018-10-13 DIAGNOSIS — E11.9 DIABETES MELLITUS TYPE 2 IN NONOBESE (HCC): ICD-10-CM

## 2018-10-15 RX ORDER — GLIMEPIRIDE 1 MG/1
1 TABLET ORAL
Qty: 30 TABLET | Refills: 2 | Status: SHIPPED | OUTPATIENT
Start: 2018-10-15 | End: 2019-01-12 | Stop reason: SDUPTHER

## 2018-11-07 LAB
LEFT EYE DIABETIC RETINOPATHY: NORMAL
RIGHT EYE DIABETIC RETINOPATHY: NORMAL
SEVERITY (EYE EXAM): NORMAL

## 2018-11-29 DIAGNOSIS — Z79.4 TYPE 2 DIABETES MELLITUS WITH COMPLICATION, WITH LONG-TERM CURRENT USE OF INSULIN (HCC): ICD-10-CM

## 2018-11-29 DIAGNOSIS — E11.8 TYPE 2 DIABETES MELLITUS WITH COMPLICATION, WITH LONG-TERM CURRENT USE OF INSULIN (HCC): ICD-10-CM

## 2018-12-21 DIAGNOSIS — E11.8 TYPE 2 DIABETES MELLITUS WITH COMPLICATION, WITH LONG-TERM CURRENT USE OF INSULIN (HCC): ICD-10-CM

## 2018-12-21 DIAGNOSIS — Z79.4 TYPE 2 DIABETES MELLITUS WITH COMPLICATION, WITH LONG-TERM CURRENT USE OF INSULIN (HCC): ICD-10-CM

## 2019-01-12 DIAGNOSIS — E11.9 DIABETES MELLITUS TYPE 2 IN NONOBESE (HCC): ICD-10-CM

## 2019-01-14 RX ORDER — GLIMEPIRIDE 1 MG/1
TABLET ORAL
Qty: 30 TABLET | Refills: 2 | Status: SHIPPED | OUTPATIENT
Start: 2019-01-14 | End: 2019-04-06 | Stop reason: SDUPTHER

## 2019-02-24 DIAGNOSIS — G30.1 LATE ONSET ALZHEIMER'S DISEASE WITH BEHAVIORAL DISTURBANCE (HCC): ICD-10-CM

## 2019-02-24 DIAGNOSIS — F02.81 LATE ONSET ALZHEIMER'S DISEASE WITH BEHAVIORAL DISTURBANCE (HCC): ICD-10-CM

## 2019-02-25 RX ORDER — DONEPEZIL HYDROCHLORIDE 10 MG/1
TABLET, FILM COATED ORAL
Qty: 90 TABLET | Refills: 0 | Status: SHIPPED | OUTPATIENT
Start: 2019-02-25 | End: 2019-04-11 | Stop reason: SDUPTHER

## 2019-04-06 DIAGNOSIS — E11.9 DIABETES MELLITUS TYPE 2 IN NONOBESE (HCC): ICD-10-CM

## 2019-04-08 ENCOUNTER — APPOINTMENT (OUTPATIENT)
Dept: LAB | Facility: HOSPITAL | Age: 84
End: 2019-04-08
Attending: INTERNAL MEDICINE
Payer: MEDICARE

## 2019-04-08 ENCOUNTER — TRANSCRIBE ORDERS (OUTPATIENT)
Dept: ADMINISTRATIVE | Facility: HOSPITAL | Age: 84
End: 2019-04-08

## 2019-04-08 DIAGNOSIS — E11.9 TYPE 2 DIABETES MELLITUS WITHOUT COMPLICATION, WITH LONG-TERM CURRENT USE OF INSULIN (HCC): ICD-10-CM

## 2019-04-08 DIAGNOSIS — I50.32 CHRONIC DIASTOLIC HEART FAILURE (HCC): ICD-10-CM

## 2019-04-08 DIAGNOSIS — E11.9 DIABETES MELLITUS WITHOUT COMPLICATION (HCC): ICD-10-CM

## 2019-04-08 DIAGNOSIS — I50.32 CHRONIC DIASTOLIC CONGESTIVE HEART FAILURE (HCC): ICD-10-CM

## 2019-04-08 DIAGNOSIS — Z79.4 TYPE 2 DIABETES MELLITUS WITHOUT COMPLICATION, WITH LONG-TERM CURRENT USE OF INSULIN (HCC): ICD-10-CM

## 2019-04-08 DIAGNOSIS — E11.9 DIABETES MELLITUS WITHOUT COMPLICATION (HCC): Primary | ICD-10-CM

## 2019-04-08 LAB
ALBUMIN SERPL BCP-MCNC: 3.4 G/DL (ref 3.5–5)
ALP SERPL-CCNC: 77 U/L (ref 46–116)
ALT SERPL W P-5'-P-CCNC: 21 U/L (ref 12–78)
ANION GAP SERPL CALCULATED.3IONS-SCNC: 10 MMOL/L (ref 4–13)
AST SERPL W P-5'-P-CCNC: 10 U/L (ref 5–45)
BASOPHILS # BLD AUTO: 0.05 THOUSANDS/ΜL (ref 0–0.1)
BASOPHILS NFR BLD AUTO: 1 % (ref 0–1)
BILIRUB SERPL-MCNC: 0.4 MG/DL (ref 0.2–1)
BUN SERPL-MCNC: 14 MG/DL (ref 5–25)
CALCIUM SERPL-MCNC: 9.1 MG/DL (ref 8.3–10.1)
CHLORIDE SERPL-SCNC: 100 MMOL/L (ref 100–108)
CO2 SERPL-SCNC: 26 MMOL/L (ref 21–32)
CREAT SERPL-MCNC: 0.8 MG/DL (ref 0.6–1.3)
EOSINOPHIL # BLD AUTO: 0.17 THOUSAND/ΜL (ref 0–0.61)
EOSINOPHIL NFR BLD AUTO: 2 % (ref 0–6)
ERYTHROCYTE [DISTWIDTH] IN BLOOD BY AUTOMATED COUNT: 12.9 % (ref 11.6–15.1)
EST. AVERAGE GLUCOSE BLD GHB EST-MCNC: 232 MG/DL
GFR SERPL CREATININE-BSD FRML MDRD: 66 ML/MIN/1.73SQ M
GLUCOSE P FAST SERPL-MCNC: 271 MG/DL (ref 65–99)
HBA1C MFR BLD: 9.7 % (ref 4.2–6.3)
HCT VFR BLD AUTO: 41.3 % (ref 34.8–46.1)
HGB BLD-MCNC: 13 G/DL (ref 11.5–15.4)
IMM GRANULOCYTES # BLD AUTO: 0.03 THOUSAND/UL (ref 0–0.2)
IMM GRANULOCYTES NFR BLD AUTO: 0 % (ref 0–2)
LYMPHOCYTES # BLD AUTO: 1.25 THOUSANDS/ΜL (ref 0.6–4.47)
LYMPHOCYTES NFR BLD AUTO: 16 % (ref 14–44)
MCH RBC QN AUTO: 28.7 PG (ref 26.8–34.3)
MCHC RBC AUTO-ENTMCNC: 31.5 G/DL (ref 31.4–37.4)
MCV RBC AUTO: 91 FL (ref 82–98)
MONOCYTES # BLD AUTO: 0.69 THOUSAND/ΜL (ref 0.17–1.22)
MONOCYTES NFR BLD AUTO: 9 % (ref 4–12)
NEUTROPHILS # BLD AUTO: 5.68 THOUSANDS/ΜL (ref 1.85–7.62)
NEUTS SEG NFR BLD AUTO: 72 % (ref 43–75)
NRBC BLD AUTO-RTO: 0 /100 WBCS
PLATELET # BLD AUTO: 233 THOUSANDS/UL (ref 149–390)
PMV BLD AUTO: 12.1 FL (ref 8.9–12.7)
POTASSIUM SERPL-SCNC: 3.9 MMOL/L (ref 3.5–5.3)
PROT SERPL-MCNC: 7.2 G/DL (ref 6.4–8.2)
RBC # BLD AUTO: 4.53 MILLION/UL (ref 3.81–5.12)
SODIUM SERPL-SCNC: 136 MMOL/L (ref 136–145)
TSH SERPL DL<=0.05 MIU/L-ACNC: 3.52 UIU/ML (ref 0.36–3.74)
WBC # BLD AUTO: 7.87 THOUSAND/UL (ref 4.31–10.16)

## 2019-04-08 PROCEDURE — 84443 ASSAY THYROID STIM HORMONE: CPT

## 2019-04-08 PROCEDURE — 83036 HEMOGLOBIN GLYCOSYLATED A1C: CPT

## 2019-04-08 PROCEDURE — 85025 COMPLETE CBC W/AUTO DIFF WBC: CPT

## 2019-04-08 PROCEDURE — 36415 COLL VENOUS BLD VENIPUNCTURE: CPT

## 2019-04-08 PROCEDURE — 80053 COMPREHEN METABOLIC PANEL: CPT

## 2019-04-08 RX ORDER — GLIMEPIRIDE 1 MG/1
TABLET ORAL
Qty: 30 TABLET | Refills: 2 | Status: SHIPPED | OUTPATIENT
Start: 2019-04-08 | End: 2019-07-09 | Stop reason: SDUPTHER

## 2019-04-11 ENCOUNTER — OFFICE VISIT (OUTPATIENT)
Dept: INTERNAL MEDICINE CLINIC | Facility: CLINIC | Age: 84
End: 2019-04-11
Payer: MEDICARE

## 2019-04-11 VITALS
SYSTOLIC BLOOD PRESSURE: 116 MMHG | WEIGHT: 125.4 LBS | BODY MASS INDEX: 24.62 KG/M2 | HEART RATE: 121 BPM | HEIGHT: 60 IN | OXYGEN SATURATION: 96 % | DIASTOLIC BLOOD PRESSURE: 70 MMHG

## 2019-04-11 DIAGNOSIS — Z79.4 TYPE 2 DIABETES MELLITUS WITH COMPLICATION, WITH LONG-TERM CURRENT USE OF INSULIN (HCC): ICD-10-CM

## 2019-04-11 DIAGNOSIS — E11.9 TYPE 2 DIABETES MELLITUS WITHOUT COMPLICATION, WITH LONG-TERM CURRENT USE OF INSULIN (HCC): Primary | ICD-10-CM

## 2019-04-11 DIAGNOSIS — Z79.4 TYPE 2 DIABETES MELLITUS WITHOUT COMPLICATION, WITH LONG-TERM CURRENT USE OF INSULIN (HCC): Primary | ICD-10-CM

## 2019-04-11 DIAGNOSIS — E11.9 DIABETES MELLITUS TYPE 2 IN NONOBESE (HCC): ICD-10-CM

## 2019-04-11 DIAGNOSIS — E11.8 TYPE 2 DIABETES MELLITUS WITH COMPLICATION, WITH LONG-TERM CURRENT USE OF INSULIN (HCC): ICD-10-CM

## 2019-04-11 DIAGNOSIS — I50.32 CHRONIC DIASTOLIC CONGESTIVE HEART FAILURE (HCC): ICD-10-CM

## 2019-04-11 PROBLEM — L89.311 DECUBITUS ULCER OF RIGHT BUTTOCK, STAGE 1: Status: RESOLVED | Noted: 2018-09-19 | Resolved: 2019-04-11

## 2019-04-11 PROBLEM — L89.892 PRESSURE ULCER OF TOE OF RIGHT FOOT, STAGE 2 (HCC): Status: RESOLVED | Noted: 2018-09-19 | Resolved: 2019-04-11

## 2019-04-11 PROBLEM — L89.892 PRESSURE ULCER OF TOE OF LEFT FOOT, STAGE 2 (HCC): Status: RESOLVED | Noted: 2018-09-19 | Resolved: 2019-04-11

## 2019-04-11 PROCEDURE — 99213 OFFICE O/P EST LOW 20 MIN: CPT | Performed by: INTERNAL MEDICINE

## 2019-05-09 ENCOUNTER — PATIENT OUTREACH (OUTPATIENT)
Dept: INTERNAL MEDICINE CLINIC | Facility: CLINIC | Age: 84
End: 2019-05-09

## 2019-05-09 DIAGNOSIS — Z71.89 COMPLEX CARE COORDINATION: Primary | ICD-10-CM

## 2019-05-16 ENCOUNTER — PATIENT OUTREACH (OUTPATIENT)
Dept: INTERNAL MEDICINE CLINIC | Facility: CLINIC | Age: 84
End: 2019-05-16

## 2019-05-22 DIAGNOSIS — G30.1 LATE ONSET ALZHEIMER'S DISEASE WITH BEHAVIORAL DISTURBANCE (HCC): ICD-10-CM

## 2019-05-22 DIAGNOSIS — F02.81 LATE ONSET ALZHEIMER'S DISEASE WITH BEHAVIORAL DISTURBANCE (HCC): ICD-10-CM

## 2019-05-22 RX ORDER — DONEPEZIL HYDROCHLORIDE 10 MG/1
TABLET, FILM COATED ORAL
Qty: 90 TABLET | Refills: 0 | Status: SHIPPED | OUTPATIENT
Start: 2019-05-22 | End: 2019-08-23 | Stop reason: SDUPTHER

## 2019-05-29 LAB
LEFT EYE DIABETIC RETINOPATHY: NORMAL
RIGHT EYE DIABETIC RETINOPATHY: NORMAL

## 2019-07-09 DIAGNOSIS — E11.9 DIABETES MELLITUS TYPE 2 IN NONOBESE (HCC): ICD-10-CM

## 2019-07-09 RX ORDER — GLIMEPIRIDE 1 MG/1
TABLET ORAL
Qty: 90 TABLET | Refills: 0 | Status: SHIPPED | OUTPATIENT
Start: 2019-07-09 | End: 2019-10-09 | Stop reason: SDUPTHER

## 2019-08-06 DIAGNOSIS — E11.9 TYPE 2 DIABETES MELLITUS WITHOUT COMPLICATION, WITHOUT LONG-TERM CURRENT USE OF INSULIN (HCC): ICD-10-CM

## 2019-08-23 DIAGNOSIS — G30.1 LATE ONSET ALZHEIMER'S DISEASE WITH BEHAVIORAL DISTURBANCE (HCC): ICD-10-CM

## 2019-08-23 DIAGNOSIS — F02.81 LATE ONSET ALZHEIMER'S DISEASE WITH BEHAVIORAL DISTURBANCE (HCC): ICD-10-CM

## 2019-08-23 RX ORDER — DONEPEZIL HYDROCHLORIDE 10 MG/1
TABLET, FILM COATED ORAL
Qty: 90 TABLET | Refills: 0 | Status: SHIPPED | OUTPATIENT
Start: 2019-08-23 | End: 2020-04-28

## 2019-09-11 DIAGNOSIS — E11.8 TYPE 2 DIABETES MELLITUS WITH COMPLICATION, WITH LONG-TERM CURRENT USE OF INSULIN (HCC): ICD-10-CM

## 2019-09-11 DIAGNOSIS — Z79.4 TYPE 2 DIABETES MELLITUS WITH COMPLICATION, WITH LONG-TERM CURRENT USE OF INSULIN (HCC): ICD-10-CM

## 2019-09-11 RX ORDER — PEN NEEDLE, DIABETIC 29 G X1/2"
NEEDLE, DISPOSABLE MISCELLANEOUS
Qty: 200 EACH | Refills: 2 | Status: SHIPPED | OUTPATIENT
Start: 2019-09-11 | End: 2020-09-22

## 2019-10-09 DIAGNOSIS — E11.9 DIABETES MELLITUS TYPE 2 IN NONOBESE (HCC): ICD-10-CM

## 2019-10-09 RX ORDER — GLIMEPIRIDE 1 MG/1
TABLET ORAL
Qty: 90 TABLET | Refills: 0 | Status: SHIPPED | OUTPATIENT
Start: 2019-10-09 | End: 2019-10-25

## 2019-10-25 ENCOUNTER — OFFICE VISIT (OUTPATIENT)
Dept: INTERNAL MEDICINE CLINIC | Facility: CLINIC | Age: 84
End: 2019-10-25
Payer: MEDICARE

## 2019-10-25 VITALS
OXYGEN SATURATION: 98 % | HEART RATE: 84 BPM | SYSTOLIC BLOOD PRESSURE: 110 MMHG | HEIGHT: 60 IN | WEIGHT: 122.6 LBS | BODY MASS INDEX: 24.07 KG/M2 | DIASTOLIC BLOOD PRESSURE: 70 MMHG

## 2019-10-25 DIAGNOSIS — L89.321 PRESSURE INJURY OF LEFT BUTTOCK, STAGE 1: Primary | ICD-10-CM

## 2019-10-25 DIAGNOSIS — I35.9 AORTIC VALVE DISORDER: ICD-10-CM

## 2019-10-25 DIAGNOSIS — G30.1 LATE ONSET ALZHEIMER'S DISEASE WITHOUT BEHAVIORAL DISTURBANCE (HCC): ICD-10-CM

## 2019-10-25 DIAGNOSIS — Z23 NEED FOR INFLUENZA VACCINATION: ICD-10-CM

## 2019-10-25 DIAGNOSIS — E11.9 TYPE 2 DIABETES MELLITUS WITHOUT COMPLICATION, WITH LONG-TERM CURRENT USE OF INSULIN (HCC): ICD-10-CM

## 2019-10-25 DIAGNOSIS — F02.80 LATE ONSET ALZHEIMER'S DISEASE WITHOUT BEHAVIORAL DISTURBANCE (HCC): ICD-10-CM

## 2019-10-25 DIAGNOSIS — Z79.4 TYPE 2 DIABETES MELLITUS WITHOUT COMPLICATION, WITH LONG-TERM CURRENT USE OF INSULIN (HCC): ICD-10-CM

## 2019-10-25 PROBLEM — I50.9 CHF (CONGESTIVE HEART FAILURE) (HCC): Status: RESOLVED | Noted: 2018-05-12 | Resolved: 2019-10-25

## 2019-10-25 PROBLEM — R55 SYNCOPE: Status: ACTIVE | Noted: 2019-10-25

## 2019-10-25 PROCEDURE — G0439 PPPS, SUBSEQ VISIT: HCPCS | Performed by: INTERNAL MEDICINE

## 2019-10-25 PROCEDURE — 99213 OFFICE O/P EST LOW 20 MIN: CPT | Performed by: INTERNAL MEDICINE

## 2019-10-25 PROCEDURE — 90662 IIV NO PRSV INCREASED AG IM: CPT | Performed by: INTERNAL MEDICINE

## 2019-10-25 PROCEDURE — G0008 ADMIN INFLUENZA VIRUS VAC: HCPCS | Performed by: INTERNAL MEDICINE

## 2019-10-25 NOTE — PATIENT INSTRUCTIONS
Patient with the above issues  I am concerned that the syncopal episodes might be hypoglycemic episodes  Granted there rare but there still risky  Discontinue glimepiride and continue the insulin and the metformin  Recheck A1c today  I think no colonoscopy or mammogram should be done  I think that we need to be in pretty much palliative care mode  DuoDerm for the bedsore

## 2019-10-25 NOTE — PROGRESS NOTES
Assessment/Plan:       Diagnoses and all orders for this visit:    Pressure injury of left buttock, stage 1    Need for influenza vaccination  -     influenza vaccine, [id number], high-dose, PF 0 5 mL (FLUZONE HIGH-DOSE)    Type 2 diabetes mellitus without complication, with long-term current use of insulin ([facility] [address] )  -     Lipid Panel with Direct LDL reflex; Future  -     CBC and differential; Future  -     Comprehensive metabolic panel; Future  -     TSH, 3rd generation with Free T4 reflex; Future  -     Urinalysis with reflex to microscopic  -     Hemoglobin A1C; Future  -     Folate; Future  -     Vitamin B12; Future    Aortic valve disorder    Late onset Alzheimer's disease without behavioral disturbance (HCC)  -     Lipid Panel with Direct LDL reflex; Future  -     CBC and differential; Future  -     Comprehensive metabolic panel; Future  -     TSH, 3rd generation with Free T4 reflex; Future  -     Urinalysis with reflex to microscopic  -     Hemoglobin A1C; Future  -     Folate; Future  -     Vitamin B12; Future                Subjective:      Patient ID: Bucky Balbuena is a 80 y o  female  An 80-year old      Chronic diagnoses are:  Severe SDAT  Lives with her adult son who is a longstanding managerial employee of the [employer]  She has a caretaker at home for hours when he is not there  Recent episode of some kind of fall or syncopal event; the the son suspects she might of had a seizure  He found her on the ground and she was out of it for the rest of the week  compensated chronic diastolic CHF    Diabetes type 2  Control has never been good  The most recent hemoglobin A1c which was below 10 is actually pretty good for her  However, she has advance to 80years old with severe Alzheimer's disease without having good blood sugar control so I do not think we can really improved things  Blood sugars have been generally okay  She has had a few hypoglycemic episodes which sinus recognize      Severe Alzheimer's dementia  She lives with her son and is alone for several hours a day  She has a long track record of missing dose of medications and hemoglobin A1c has never been well controlled but this has not seemed to have any adverse effect  Now dependent in ADLs    Pressure ulcers of the feet and buttock have resolved; in particular with the feet the son changed her shoes and that helped  Right now, the son reports that she has a decubitus of her lower buttock  Completely asymptomatic  Review of systems from both patient and son is accurate I believe    Exam is eitan for age; only real abnormality is heart murmur, and stage I left buttock decubitus ulcer  The following portions of the patient's history were reviewed and updated as appropriate:   She has a past medical history of Aortic valve disorder, CHF (congestive heart failure) ([address] ), and Syncope ,  does not have any pertinent problems on file  ,   has a past surgical history that includes Bladder surgery; Mastectomy (Right); Cataract extraction; and Total knee arthroplasty  ,  family history is not on file  ,   reports that she has never smoked  She has never used smokeless tobacco  She reports that she does not drink alcohol or use drugs  ,  is allergic to nitrofurantoin     Current Outpatient Medications   Medication Sig Dispense Refill    BD INSULIN SYRINGE U/F 31G X 5/16" 0 5 ML MISC USE TWICE DAILY 200 each 2    donepezil (ARICEPT) 10 mg tablet TAKE 1 TABLET BY MOUTH EVERY DAY 90 tablet 0    FREESTYLE LITE test strip TEST 3 TIMES A  each 3    insulin lispro protamine-insulin lispro (HUMALOG MIX 75/25) 100 units/mL Inject 50 Units under the skin 2 (two) times a day before meals 90 mL 3    Insulin Syringe-Needle U-100 (B-D INS SYRINGE 0 5CC/31GX5/16) 31G X 5/16" 0 5 ML MISC by Does not apply route      Lancets (FREESTYLE) lancets by Does not apply route      metFORMIN (GLUCOPHAGE) 1000 MG tablet TAKE 1 TABLET TWICE A DAY WITH AM AND WITH PM MEALS 180 tablet 3     No current facility-administered medications for this visit  Review of Systems   Constitutional: Negative for chills and fever  HENT: Negative for sore throat and trouble swallowing  Eyes: Negative for pain  Respiratory: Negative for cough, shortness of breath and wheezing  Cardiovascular: Negative for chest pain and leg swelling  Gastrointestinal: Negative for abdominal pain, diarrhea, nausea and vomiting  Endocrine: Negative for cold intolerance and heat intolerance  Genitourinary: Negative for dysuria, frequency and pelvic pain  Musculoskeletal: Negative for arthralgias and joint swelling  Skin: Positive for wound  Negative for rash  Allergic/Immunologic: Negative for immunocompromised state  Neurological: Positive for syncope  Negative for dizziness, seizures and headaches  Psychiatric/Behavioral: Positive for confusion  Negative for dysphoric mood  The patient is not nervous/anxious  Objective:  Vitals:    10/25/19 1422   BP: 110/70   Pulse: 84   SpO2: 98%      Physical Exam   Constitutional: She is oriented to person, place, and time  No distress  An elderly female patient not in distress; appears stated age  Speaking clearly, can carry on basic conversation but clearly demented  Does not remember her medical history  Cannot recount treatments or prior diagnoses  HENT:   Head: Normocephalic and atraumatic  Eyes: Pupils are equal, round, and reactive to light  EOM are normal    Neck: Normal range of motion  Neck supple  No tracheal deviation present  No thyromegaly present  Cardiovascular: Normal rate and regular rhythm  Exam reveals no gallop  Murmur heard  Decrescendo systolic murmur is present with a grade of 3/6  Aortic decrescendo murmur heard best precordial E 2nd intercostal spaces both right and left  Pulmonary/Chest: No respiratory distress  She has no wheezes  She has no rales     Right breast surgically absent Abdominal: Soft  Bowel sounds are normal  There is no tenderness  Musculoskeletal: Normal range of motion  She exhibits no tenderness or deformity  Neurological: She is alert and oriented to person, place, and time  Coordination normal    Skin: Skin is warm  Previously documented ulcerations of toes have resolved  Left gluteal ulcer is described by the son   Psychiatric: She has a normal mood and affect  Judgment normal          Patient Instructions   Patient with the above issues  I am concerned that the syncopal episodes might be hypoglycemic episodes  Granted there rare but there still risky  Discontinue glimepiride and continue the insulin and the metformin  Recheck A1c today  I think no colonoscopy or mammogram should be done  I think that we need to be in pretty much palliative care mode  DuoDerm for the bedsore

## 2019-10-25 NOTE — PROGRESS NOTES
Assessment and Plan:     Problem List Items Addressed This Visit     None           Preventive health issues were discussed with patient, and age appropriate screening tests were ordered as noted in patient's After Visit Summary  Personalized health advice and appropriate referrals for health education or preventive services given if needed, as noted in patient's After Visit Summary  History of Present Illness:     Patient presents for Medicare Annual Wellness visit    Patient Care Team:  Jesse Umaña MD as PCP - General  Aroldo Hercules MD     Problem List:     Patient Active Problem List   Diagnosis    Type 2 diabetes mellitus without complication, with long-term current use of insulin ([facility] )    CHF (congestive heart failure) ([facility] [facility] )    Aortic valve disorder    Need for immunization against influenza      Past Medical and Surgical History:     Past Medical History:   Diagnosis Date    Aortic valve disorder     CHF (congestive heart failure) ([facility] )     Syncope      Past Surgical History:   Procedure Laterality Date    BLADDER SURGERY      CATARACT EXTRACTION      MASTECTOMY Right     last assessed 7/29/14    TOTAL KNEE ARTHROPLASTY        Family History:     History reviewed  No pertinent family history  Social History:     Social History     Socioeconomic History    Marital status:       Spouse name: None    Number of children: None    Years of education: None    Highest education level: None   Occupational History    None   Social Needs    Financial resource strain: None    Food insecurity:     Worry: None     Inability: None    Transportation needs:     Medical: None     Non-medical: None   Tobacco Use    Smoking status: Never Smoker    Smokeless tobacco: Never Used    Tobacco comment: tobacco non user   Substance and Sexual Activity    Alcohol use: No    Drug use: No    Sexual activity: Not Currently   Lifestyle    Physical activity:     Days per week: 0 days     Minutes per session: 0 min    Stress: Not at all   Relationships    Social connections:     Talks on phone: None     Gets together: None     Attends Islam service: None     Active member of club or organization: None     Attends meetings of clubs or organizations: None     Relationship status: None    Intimate partner violence:     Fear of current or ex partner: None     Emotionally abused: None     Physically abused: None     Forced sexual activity: None   Other Topics Concern    None   Social History Narrative    None       Medications and Allergies:     Current Outpatient Medications   Medication Sig Dispense Refill    BD INSULIN SYRINGE U/F 31G X 5/16" 0 5 ML MISC USE TWICE DAILY 200 each 2    donepezil (ARICEPT) 10 mg tablet TAKE 1 TABLET BY MOUTH EVERY DAY 90 tablet 0    FREESTYLE LITE test strip TEST 3 TIMES A  each 3    glimepiride (AMARYL) 1 mg tablet TAKE 1 TABLET BY MOUTH EVERY DAY WITH BREAKFAST 90 tablet 0    insulin lispro protamine-insulin lispro (HUMALOG MIX 75/25) 100 units/mL Inject 50 Units under the skin 2 (two) times a day before meals 90 mL 3    Insulin Syringe-Needle U-100 (B-D INS SYRINGE 0 5CC/31GX5/16) 31G X 5/16" 0 5 ML MISC by Does not apply route      Lancets (FREESTYLE) lancets by Does not apply route      metFORMIN (GLUCOPHAGE) 1000 MG tablet TAKE 1 TABLET TWICE A DAY WITH AM AND WITH PM MEALS 180 tablet 3     No current facility-administered medications for this visit        Allergies   Allergen Reactions    Nitrofurantoin       Immunizations:     Immunization History   Administered Date(s) Administered    INFLUENZA 01/22/2016, 10/12/2017    Influenza Split High Dose Preservative Free IM 10/01/2014, 01/22/2016, 10/12/2017    Influenza, high dose seasonal 0 5 mL 09/19/2018    Pneumococcal Polysaccharide PPV23 08/30/1930    Tdap 08/30/1930    Zoster 08/30/1930      Health Maintenance:         Topic Date Due    DXA SCAN  08/30/1930         Topic Date Due    HEPATITIS B VACCINES (1 of 3 - Risk 3-dose series) 08/30/1949    DTaP,Tdap,and Td Vaccines (1 - Tdap) 08/30/1951    Pneumococcal Vaccine: 65+ Years (1 of 2 - PCV13) 08/30/1995    INFLUENZA VACCINE  07/01/2019      Medicare Health Risk Assessment:     /70 (BP Location: Left arm, Patient Position: Sitting, Cuff Size: Standard)   Pulse 84   Ht 5' (1 524 m)   Wt 55 6 kg (122 lb 9 6 oz)   SpO2 98%   BMI 23 94 kg/m²      Sofy Patricia is here for her Subsequent Wellness visit  Health Risk Assessment:   Patient rates overall health as good  Patient feels that their physical health rating is slightly worse  Eyesight was rated as slightly worse  Hearing was rated as slightly worse  Patient feels that their emotional and mental health rating is same  Pain experienced in the last 7 days has been some  Patient's pain rating has been 6/10  Depression Screening:   PHQ-2 Score: 0      Fall Risk Screening: In the past year, patient has experienced: history of falling in past year    Number of falls: 1  Injured during fall?: No      Urinary Incontinence Screening:   Patient has leaked urine accidently in the last six months  Home Safety:  Patient has trouble with stairs inside or outside of their home  Patient has no working smoke alarms and has no working carbon monoxide detector  Home safety hazards include: none  Nutrition:   Current diet is Diabetic  Medications:   Patient is not currently taking any over-the-counter supplements  Patient is not able to manage medications  Activities of Daily Living (ADLs)/Instrumental Activities of Daily Living (IADLs):   Walk and transfer into and out of bed and chair?: Yes  Dress and groom yourself?: No    Bathe or shower yourself?: No    Feed yourself?  No  Do your laundry/housekeeping?: No  Manage your money, pay your bills and track your expenses?: No  Make your own meals?: No    Do your own shopping?: No    Previous Hospitalizations:   Any hospitalizations or ED visits within the last 12 months?: No      Advance Care Planning:   Living will: Yes    Durable POA for healthcare:  Yes    Advanced directive: Yes    Five wishes given: Yes      Cognitive Screening:   Provider or family/friend/caregiver concerned regarding cognition?: Yes    PREVENTIVE SCREENINGS      Cardiovascular Screening:    General: Screening Current      Diabetes Screening:     General: Screening Not Indicated and History Diabetes      Colorectal Cancer Screening:     General: Screening Not Indicated      Breast Cancer Screening:     General: Screening Not Indicated      Cervical Cancer Screening:    General: Screening Not Indicated      Osteoporosis Screening:    General: Risks and Benefits Discussed      Abdominal Aortic Aneurysm (AAA) Screening:        General: Screening Not Indicated      Lung Cancer Screening:     General: Screening Not Indicated      Hepatitis C Screening:    General: Screening Not Indicated      Mariela Tillman MD

## 2019-10-30 ENCOUNTER — TELEPHONE (OUTPATIENT)
Dept: INTERNAL MEDICINE CLINIC | Facility: CLINIC | Age: 84
End: 2019-10-30

## 2019-10-30 NOTE — TELEPHONE ENCOUNTER
Pt was seen on 10/25 by Dr Michelle Gomez, said she was prescribed a cream for a pressure sore on her bottock  When her son went to  the medication, the pharmacist said it was a patch? They need a cream sent   Did not see anything on her med list     Send to [facility] in [place]

## 2019-10-30 NOTE — TELEPHONE ENCOUNTER
CALLED SON AND WAS NOTIFIED AND HE ASKING FOR A CREAM AS DOES NOT THINK THE PATCH WILL WORK AS SORE TO CLOSE TO [facility]

## 2019-11-21 DIAGNOSIS — G30.1 LATE ONSET ALZHEIMER'S DISEASE WITH BEHAVIORAL DISTURBANCE (HCC): ICD-10-CM

## 2019-11-21 DIAGNOSIS — F02.81 LATE ONSET ALZHEIMER'S DISEASE WITH BEHAVIORAL DISTURBANCE (HCC): ICD-10-CM

## 2019-11-22 RX ORDER — DONEPEZIL HYDROCHLORIDE 10 MG/1
TABLET, FILM COATED ORAL
Qty: 90 TABLET | Refills: 0 | OUTPATIENT
Start: 2019-11-22

## 2020-01-03 DIAGNOSIS — E11.9 DIABETES MELLITUS TYPE 2 IN NONOBESE (HCC): ICD-10-CM

## 2020-01-03 RX ORDER — GLIMEPIRIDE 1 MG/1
TABLET ORAL
Qty: 90 TABLET | Refills: 0 | Status: SHIPPED | OUTPATIENT
Start: 2020-01-03 | End: 2020-04-06

## 2020-01-16 ENCOUNTER — TELEPHONE (OUTPATIENT)
Dept: INTERNAL MEDICINE CLINIC | Facility: CLINIC | Age: 85
End: 2020-01-16

## 2020-01-16 NOTE — TELEPHONE ENCOUNTER
MSG  PRINTED AND GIVEN TO DR Diane Justin AS CALLED PT  AND  ASKED WHAT QUESTIONS HE HAD AS DR Ihsan Medeiros PT'S AND STATED HE DOES NOT WANT TO  GO THRU  THIRD PARTY Gualberto Valadez DR   TO CALL HIM

## 2020-01-16 NOTE — TELEPHONE ENCOUNTER
Jena Nielson would like to speak with Dr Mindi Lopez before he leaves today regarding Chel Santana  She had a seizure yesterday into last night and today she is not functional  He would like to ask a question about the end of life Healthcare POA   Its very important, please call back at [phone]

## 2020-01-16 NOTE — TELEPHONE ENCOUNTER
The son called  This patient with advanced dementia has had a change in mental status at home  The son wishes to keep  Her at home  We discussed this   There is no indication to send the patient for imaging or further health evaluation given her advanced dementia and the desire of the family to pursue palliative and hospice care  This can be done at home with no intervention by any healthcare professionals  There is nothing on ethical about this  It is medically indicated

## 2020-01-16 NOTE — TELEPHONE ENCOUNTER
Patient son called back  Please call patient son  Nestor Hollands  He has some questions to ask Dr Earline Gil before he leaves today    [phone]

## 2020-01-22 ENCOUNTER — OFFICE VISIT (OUTPATIENT)
Dept: INTERNAL MEDICINE CLINIC | Facility: CLINIC | Age: 85
End: 2020-01-22
Payer: MEDICARE

## 2020-01-22 VITALS
DIASTOLIC BLOOD PRESSURE: 60 MMHG | SYSTOLIC BLOOD PRESSURE: 110 MMHG | WEIGHT: 110 LBS | HEART RATE: 82 BPM | HEIGHT: 60 IN | BODY MASS INDEX: 21.6 KG/M2 | OXYGEN SATURATION: 98 %

## 2020-01-22 DIAGNOSIS — L89.620 PRESSURE INJURY OF LEFT HEEL, UNSTAGEABLE (HCC): Primary | ICD-10-CM

## 2020-01-22 PROCEDURE — 99213 OFFICE O/P EST LOW 20 MIN: CPT | Performed by: INTERNAL MEDICINE

## 2020-01-22 NOTE — PATIENT INSTRUCTIONS
A dementia patient with heel decubitus ulcer which are apparently fairly acute but which are not infected  unstageable due to eschar but I do not think this needs anything more than offloading  This can be done I think by applying heel protectors such as the "convoluted phone call model, and by modifying her use of the foot stool to the best possible ability  Call if anything that looks worse; in particular I mean if those areas of black eschar developed drainage or if the redness starts going up the leg

## 2020-01-22 NOTE — PROGRESS NOTES
Assessment/Plan:       Diagnoses and all orders for this visit:    Pressure injury of left heel, unstageable ([facility] [address] )                Subjective:      Patient ID: Jeannine Segura is a 80 y o  female  Pressure injury of left heel  An elderly advanced Alzheimer's patient is brought in by her son because he noticed decubitus ulcer of the left heel  She has habit of sitting immobile with her chair on a foot stool and today he saw that she has a 2 centimeter across around black decubitus which is eschar but with no bacteria inflammatoryl drainage     no fever or chills or sweats     patient's dementia precludes review of systems      The following portions of the patient's history were reviewed and updated as appropriate:   She has a past medical history of Aortic valve disorder, CHF (congestive heart failure) ([facility] [address] ), and Syncope ,  does not have any pertinent problems on file  ,   has a past surgical history that includes Bladder surgery; Mastectomy (Right); Cataract extraction; and Total knee arthroplasty  ,  family history is not on file  ,   reports that she has never smoked  She has never used smokeless tobacco  She reports that she does not drink alcohol or use drugs  ,  is allergic to nitrofurantoin     Current Outpatient Medications   Medication Sig Dispense Refill    BD INSULIN SYRINGE U/F 31G X 5/16" 0 5 ML MISC USE TWICE DAILY 200 each 2    donepezil (ARICEPT) 10 mg tablet TAKE 1 TABLET BY MOUTH EVERY DAY 90 tablet 0    FREESTYLE LITE test strip TEST 3 TIMES A  each 3    glimepiride (AMARYL) 1 mg tablet TAKE 1 TABLET BY MOUTH EVERY DAY WITH BREAKFAST 90 tablet 0    insulin lispro protamine-insulin lispro (HUMALOG MIX 75/25) 100 units/mL Inject 50 Units under the skin 2 (two) times a day before meals 90 mL 3    Insulin Syringe-Needle U-100 (B-D INS SYRINGE 0 5CC/31GX5/16) 31G X 5/16" 0 5 ML MISC by Does not apply route      Lancets (FREESTYLE) lancets by Does not apply route      metFORMIN (GLUCOPHAGE) 1000 MG tablet TAKE 1 TABLET TWICE A DAY WITH AM AND WITH PM MEALS 180 tablet 3     No current facility-administered medications for this visit  Review of Systems   Unable to perform ROS: Dementia         Objective:  Vitals:    01/22/20 1301   BP: 110/60   Pulse: 82   SpO2: 98%      Physical Exam   Constitutional:    Awake patient who appears stated age, nonverbal   Cardiovascular: Normal rate  Pulmonary/Chest: Effort normal    Neurological: She is alert  Skin:    Heel ulcer left heel with dry black eschar 2 centimeters across with some circumscribing erythema but no drainage         Patient Instructions     A dementia patient with heel decubitus ulcer which are apparently fairly acute but which are not infected  unstageable due to eschar but I do not think this needs anything more than offloading  This can be done I think by applying heel protectors such as the "convoluted phone call model, and by modifying her use of the foot stool to the best possible ability  Call if anything that looks worse; in particular I mean if those areas of black eschar developed drainage or if the redness starts going up the leg

## 2020-03-05 ENCOUNTER — PATIENT OUTREACH (OUTPATIENT)
Dept: INTERNAL MEDICINE CLINIC | Facility: CLINIC | Age: 85
End: 2020-03-05

## 2020-03-05 DIAGNOSIS — Z71.89 COMPLEX CARE COORDINATION: Primary | ICD-10-CM

## 2020-03-18 ENCOUNTER — PATIENT OUTREACH (OUTPATIENT)
Dept: INTERNAL MEDICINE CLINIC | Facility: CLINIC | Age: 85
End: 2020-03-18

## 2020-03-24 ENCOUNTER — PATIENT OUTREACH (OUTPATIENT)
Dept: INTERNAL MEDICINE CLINIC | Facility: CLINIC | Age: 85
End: 2020-03-24

## 2020-03-24 NOTE — PROGRESS NOTES
Multiple attempts made to contact patient with out success  Will close from outpatient care management at this time

## 2020-04-04 DIAGNOSIS — E11.9 DIABETES MELLITUS TYPE 2 IN NONOBESE (HCC): ICD-10-CM

## 2020-04-06 RX ORDER — GLIMEPIRIDE 1 MG/1
TABLET ORAL
Qty: 90 TABLET | Refills: 0 | Status: SHIPPED | OUTPATIENT
Start: 2020-04-06 | End: 2020-04-28

## 2020-04-10 ENCOUNTER — APPOINTMENT (OUTPATIENT)
Dept: LAB | Facility: HOSPITAL | Age: 85
End: 2020-04-10
Attending: INTERNAL MEDICINE
Payer: MEDICARE

## 2020-04-10 DIAGNOSIS — Z79.4 TYPE 2 DIABETES MELLITUS WITHOUT COMPLICATION, WITH LONG-TERM CURRENT USE OF INSULIN (HCC): ICD-10-CM

## 2020-04-10 DIAGNOSIS — E11.9 TYPE 2 DIABETES MELLITUS WITHOUT COMPLICATION, WITH LONG-TERM CURRENT USE OF INSULIN (HCC): ICD-10-CM

## 2020-04-10 DIAGNOSIS — F02.80 LATE ONSET ALZHEIMER'S DISEASE WITHOUT BEHAVIORAL DISTURBANCE (HCC): ICD-10-CM

## 2020-04-10 DIAGNOSIS — G30.1 LATE ONSET ALZHEIMER'S DISEASE WITHOUT BEHAVIORAL DISTURBANCE (HCC): ICD-10-CM

## 2020-04-10 LAB
ALBUMIN SERPL BCP-MCNC: 3.2 G/DL (ref 3.5–5)
ALP SERPL-CCNC: 109 U/L (ref 46–116)
ALT SERPL W P-5'-P-CCNC: 22 U/L (ref 12–78)
ANION GAP SERPL CALCULATED.3IONS-SCNC: 9 MMOL/L (ref 4–13)
AST SERPL W P-5'-P-CCNC: 17 U/L (ref 5–45)
BASOPHILS # BLD AUTO: 0.05 THOUSANDS/ΜL (ref 0–0.1)
BASOPHILS NFR BLD AUTO: 1 % (ref 0–1)
BILIRUB SERPL-MCNC: 0.2 MG/DL (ref 0.2–1)
BUN SERPL-MCNC: 22 MG/DL (ref 5–25)
CALCIUM SERPL-MCNC: 8.6 MG/DL (ref 8.3–10.1)
CHLORIDE SERPL-SCNC: 101 MMOL/L (ref 100–108)
CHOLEST SERPL-MCNC: 275 MG/DL (ref 50–200)
CO2 SERPL-SCNC: 27 MMOL/L (ref 21–32)
CREAT SERPL-MCNC: 0.97 MG/DL (ref 0.6–1.3)
EOSINOPHIL # BLD AUTO: 0.17 THOUSAND/ΜL (ref 0–0.61)
EOSINOPHIL NFR BLD AUTO: 2 % (ref 0–6)
ERYTHROCYTE [DISTWIDTH] IN BLOOD BY AUTOMATED COUNT: 13.4 % (ref 11.6–15.1)
EST. AVERAGE GLUCOSE BLD GHB EST-MCNC: 177 MG/DL
FOLATE SERPL-MCNC: 14.1 NG/ML (ref 3.1–17.5)
GFR SERPL CREATININE-BSD FRML MDRD: 52 ML/MIN/1.73SQ M
GLUCOSE P FAST SERPL-MCNC: 230 MG/DL (ref 65–99)
HBA1C MFR BLD: 7.8 %
HCT VFR BLD AUTO: 39.6 % (ref 34.8–46.1)
HDLC SERPL-MCNC: 88 MG/DL
HGB BLD-MCNC: 12.3 G/DL (ref 11.5–15.4)
IMM GRANULOCYTES # BLD AUTO: 0.04 THOUSAND/UL (ref 0–0.2)
IMM GRANULOCYTES NFR BLD AUTO: 1 % (ref 0–2)
LDLC SERPL CALC-MCNC: 166 MG/DL (ref 0–100)
LYMPHOCYTES # BLD AUTO: 1.45 THOUSANDS/ΜL (ref 0.6–4.47)
LYMPHOCYTES NFR BLD AUTO: 20 % (ref 14–44)
MCH RBC QN AUTO: 28.5 PG (ref 26.8–34.3)
MCHC RBC AUTO-ENTMCNC: 31.1 G/DL (ref 31.4–37.4)
MCV RBC AUTO: 92 FL (ref 82–98)
MONOCYTES # BLD AUTO: 0.49 THOUSAND/ΜL (ref 0.17–1.22)
MONOCYTES NFR BLD AUTO: 7 % (ref 4–12)
NEUTROPHILS # BLD AUTO: 5.12 THOUSANDS/ΜL (ref 1.85–7.62)
NEUTS SEG NFR BLD AUTO: 69 % (ref 43–75)
NRBC BLD AUTO-RTO: 0 /100 WBCS
PLATELET # BLD AUTO: 284 THOUSANDS/UL (ref 149–390)
PMV BLD AUTO: 11.1 FL (ref 8.9–12.7)
POTASSIUM SERPL-SCNC: 3.7 MMOL/L (ref 3.5–5.3)
PROT SERPL-MCNC: 7.1 G/DL (ref 6.4–8.2)
RBC # BLD AUTO: 4.31 MILLION/UL (ref 3.81–5.12)
SODIUM SERPL-SCNC: 137 MMOL/L (ref 136–145)
T4 FREE SERPL-MCNC: 1.17 NG/DL (ref 0.76–1.46)
TRIGL SERPL-MCNC: 106 MG/DL
TSH SERPL DL<=0.05 MIU/L-ACNC: 4.49 UIU/ML (ref 0.36–3.74)
VIT B12 SERPL-MCNC: 199 PG/ML (ref 100–900)
WBC # BLD AUTO: 7.32 THOUSAND/UL (ref 4.31–10.16)

## 2020-04-10 PROCEDURE — 83036 HEMOGLOBIN GLYCOSYLATED A1C: CPT

## 2020-04-10 PROCEDURE — 84439 ASSAY OF FREE THYROXINE: CPT

## 2020-04-10 PROCEDURE — 82746 ASSAY OF FOLIC ACID SERUM: CPT

## 2020-04-10 PROCEDURE — 80053 COMPREHEN METABOLIC PANEL: CPT

## 2020-04-10 PROCEDURE — 82607 VITAMIN B-12: CPT

## 2020-04-10 PROCEDURE — 85025 COMPLETE CBC W/AUTO DIFF WBC: CPT

## 2020-04-10 PROCEDURE — 36415 COLL VENOUS BLD VENIPUNCTURE: CPT

## 2020-04-10 PROCEDURE — 84443 ASSAY THYROID STIM HORMONE: CPT

## 2020-04-10 PROCEDURE — 80061 LIPID PANEL: CPT

## 2020-04-28 ENCOUNTER — TELEMEDICINE (OUTPATIENT)
Dept: INTERNAL MEDICINE CLINIC | Facility: CLINIC | Age: 85
End: 2020-04-28
Payer: MEDICARE

## 2020-04-28 DIAGNOSIS — F02.80 LATE ONSET ALZHEIMER'S DISEASE WITHOUT BEHAVIORAL DISTURBANCE (HCC): Primary | ICD-10-CM

## 2020-04-28 DIAGNOSIS — Z79.4 TYPE 2 DIABETES MELLITUS WITHOUT COMPLICATION, WITH LONG-TERM CURRENT USE OF INSULIN (HCC): ICD-10-CM

## 2020-04-28 DIAGNOSIS — E11.9 TYPE 2 DIABETES MELLITUS WITHOUT COMPLICATION, WITH LONG-TERM CURRENT USE OF INSULIN (HCC): ICD-10-CM

## 2020-04-28 DIAGNOSIS — G30.1 LATE ONSET ALZHEIMER'S DISEASE WITHOUT BEHAVIORAL DISTURBANCE (HCC): Primary | ICD-10-CM

## 2020-04-28 PROCEDURE — 99214 OFFICE O/P EST MOD 30 MIN: CPT | Performed by: INTERNAL MEDICINE

## 2020-05-05 DIAGNOSIS — E11.9 DIABETES MELLITUS TYPE 2 IN NONOBESE (HCC): ICD-10-CM

## 2020-05-05 DIAGNOSIS — Z79.4 TYPE 2 DIABETES MELLITUS WITH COMPLICATION, WITH LONG-TERM CURRENT USE OF INSULIN (HCC): ICD-10-CM

## 2020-05-05 DIAGNOSIS — E11.8 TYPE 2 DIABETES MELLITUS WITH COMPLICATION, WITH LONG-TERM CURRENT USE OF INSULIN (HCC): ICD-10-CM

## 2020-05-05 RX ORDER — INSULIN LISPRO PROTAMIN/LISPRO 75-25/ML
VIAL (ML) SUBCUTANEOUS
Qty: 90 ML | Refills: 3 | Status: SHIPPED | OUTPATIENT
Start: 2020-05-05 | End: 2021-01-13 | Stop reason: SDUPTHER

## 2020-05-31 ENCOUNTER — HOSPITAL ENCOUNTER (EMERGENCY)
Facility: HOSPITAL | Age: 85
Discharge: HOME | End: 2020-05-31
Attending: EMERGENCY MEDICINE | Admitting: EMERGENCY MEDICINE
Payer: MEDICARE

## 2020-05-31 VITALS
DIASTOLIC BLOOD PRESSURE: 95 MMHG | OXYGEN SATURATION: 100 % | RESPIRATION RATE: 16 BRPM | SYSTOLIC BLOOD PRESSURE: 216 MMHG | WEIGHT: 136.47 LBS | HEART RATE: 82 BPM | BODY MASS INDEX: 26.65 KG/M2 | TEMPERATURE: 98.3 F

## 2020-05-31 DIAGNOSIS — E16.2 HYPOGLYCEMIA: Primary | ICD-10-CM

## 2020-05-31 DIAGNOSIS — M79.89 ARM SWELLING: ICD-10-CM

## 2020-05-31 LAB
ALBUMIN SERPL BCP-MCNC: 3.5 G/DL (ref 3.5–5)
ALP SERPL-CCNC: 120 U/L (ref 46–116)
ALT SERPL W P-5'-P-CCNC: 19 U/L (ref 12–78)
ANION GAP SERPL CALCULATED.3IONS-SCNC: 9 MMOL/L (ref 4–13)
AST SERPL W P-5'-P-CCNC: 16 U/L (ref 5–45)
BASOPHILS # BLD AUTO: 0.03 THOUSANDS/ΜL (ref 0–0.1)
BASOPHILS NFR BLD AUTO: 1 % (ref 0–1)
BILIRUB DIRECT SERPL-MCNC: 0.1 MG/DL (ref 0–0.2)
BILIRUB SERPL-MCNC: 0.3 MG/DL (ref 0.2–1)
BUN SERPL-MCNC: 23 MG/DL (ref 5–25)
CALCIUM SERPL-MCNC: 8.5 MG/DL (ref 8.3–10.1)
CHLORIDE SERPL-SCNC: 103 MMOL/L (ref 100–108)
CO2 SERPL-SCNC: 28 MMOL/L (ref 21–32)
CREAT SERPL-MCNC: 0.99 MG/DL (ref 0.6–1.3)
EOSINOPHIL # BLD AUTO: 0.07 THOUSAND/ΜL (ref 0–0.61)
EOSINOPHIL NFR BLD AUTO: 1 % (ref 0–6)
ERYTHROCYTE [DISTWIDTH] IN BLOOD BY AUTOMATED COUNT: 13.2 % (ref 11.6–15.1)
GFR SERPL CREATININE-BSD FRML MDRD: 51 ML/MIN/1.73SQ M
GLUCOSE SERPL-MCNC: 201 MG/DL (ref 65–140)
GLUCOSE SERPL-MCNC: 248 MG/DL (ref 65–140)
HCT VFR BLD AUTO: 39.6 % (ref 34.8–46.1)
HGB BLD-MCNC: 12.8 G/DL (ref 11.5–15.4)
IMM GRANULOCYTES # BLD AUTO: 0.02 THOUSAND/UL (ref 0–0.2)
IMM GRANULOCYTES NFR BLD AUTO: 0 % (ref 0–2)
LYMPHOCYTES # BLD AUTO: 0.89 THOUSANDS/ΜL (ref 0.6–4.47)
LYMPHOCYTES NFR BLD AUTO: 14 % (ref 14–44)
MCH RBC QN AUTO: 28.6 PG (ref 26.8–34.3)
MCHC RBC AUTO-ENTMCNC: 32.3 G/DL (ref 31.4–37.4)
MCV RBC AUTO: 88 FL (ref 82–98)
MONOCYTES # BLD AUTO: 0.43 THOUSAND/ΜL (ref 0.17–1.22)
MONOCYTES NFR BLD AUTO: 7 % (ref 4–12)
NEUTROPHILS # BLD AUTO: 4.87 THOUSANDS/ΜL (ref 1.85–7.62)
NEUTS SEG NFR BLD AUTO: 77 % (ref 43–75)
NRBC BLD AUTO-RTO: 0 /100 WBCS
PLATELET # BLD AUTO: 195 THOUSANDS/UL (ref 149–390)
PMV BLD AUTO: 11.1 FL (ref 8.9–12.7)
POTASSIUM SERPL-SCNC: 3.7 MMOL/L (ref 3.5–5.3)
PROT SERPL-MCNC: 7.4 G/DL (ref 6.4–8.2)
RBC # BLD AUTO: 4.48 MILLION/UL (ref 3.81–5.12)
SODIUM SERPL-SCNC: 140 MMOL/L (ref 136–145)
TROPONIN I SERPL-MCNC: <0.02 NG/ML
WBC # BLD AUTO: 6.31 THOUSAND/UL (ref 4.31–10.16)

## 2020-05-31 PROCEDURE — 85025 COMPLETE CBC W/AUTO DIFF WBC: CPT | Performed by: EMERGENCY MEDICINE

## 2020-05-31 PROCEDURE — 99285 EMERGENCY DEPT VISIT HI MDM: CPT

## 2020-05-31 PROCEDURE — 82948 REAGENT STRIP/BLOOD GLUCOSE: CPT

## 2020-05-31 PROCEDURE — 36415 COLL VENOUS BLD VENIPUNCTURE: CPT | Performed by: EMERGENCY MEDICINE

## 2020-05-31 PROCEDURE — 84484 ASSAY OF TROPONIN QUANT: CPT | Performed by: EMERGENCY MEDICINE

## 2020-05-31 PROCEDURE — 99285 EMERGENCY DEPT VISIT HI MDM: CPT | Performed by: EMERGENCY MEDICINE

## 2020-05-31 PROCEDURE — 80048 BASIC METABOLIC PNL TOTAL CA: CPT | Performed by: EMERGENCY MEDICINE

## 2020-05-31 PROCEDURE — 80076 HEPATIC FUNCTION PANEL: CPT | Performed by: EMERGENCY MEDICINE

## 2020-09-21 DIAGNOSIS — E11.8 TYPE 2 DIABETES MELLITUS WITH COMPLICATION, WITH LONG-TERM CURRENT USE OF INSULIN (HCC): ICD-10-CM

## 2020-09-21 DIAGNOSIS — Z79.4 TYPE 2 DIABETES MELLITUS WITH COMPLICATION, WITH LONG-TERM CURRENT USE OF INSULIN (HCC): ICD-10-CM

## 2020-09-22 RX ORDER — PEN NEEDLE, DIABETIC 29 G X1/2"
NEEDLE, DISPOSABLE MISCELLANEOUS
Qty: 90 EACH | Refills: 2 | Status: SHIPPED | OUTPATIENT
Start: 2020-09-22 | End: 2021-07-20 | Stop reason: SDUPTHER

## 2020-11-21 ENCOUNTER — OFFICE VISIT (OUTPATIENT)
Dept: INTERNAL MEDICINE CLINIC | Facility: CLINIC | Age: 85
End: 2020-11-21
Payer: MEDICARE

## 2020-11-21 VITALS
TEMPERATURE: 97.6 F | WEIGHT: 127 LBS | BODY MASS INDEX: 24.94 KG/M2 | HEIGHT: 60 IN | OXYGEN SATURATION: 100 % | HEART RATE: 88 BPM | DIASTOLIC BLOOD PRESSURE: 84 MMHG | SYSTOLIC BLOOD PRESSURE: 132 MMHG

## 2020-11-21 DIAGNOSIS — E11.9 TYPE 2 DIABETES MELLITUS WITHOUT COMPLICATION, WITH LONG-TERM CURRENT USE OF INSULIN (HCC): ICD-10-CM

## 2020-11-21 DIAGNOSIS — Z79.4 TYPE 2 DIABETES MELLITUS WITHOUT COMPLICATION, WITH LONG-TERM CURRENT USE OF INSULIN (HCC): ICD-10-CM

## 2020-11-21 DIAGNOSIS — Z23 NEED FOR INFLUENZA VACCINATION: Primary | ICD-10-CM

## 2020-11-21 PROCEDURE — 99213 OFFICE O/P EST LOW 20 MIN: CPT | Performed by: INTERNAL MEDICINE

## 2020-11-21 PROCEDURE — 90662 IIV NO PRSV INCREASED AG IM: CPT | Performed by: INTERNAL MEDICINE

## 2020-11-21 PROCEDURE — G0008 ADMIN INFLUENZA VIRUS VAC: HCPCS | Performed by: INTERNAL MEDICINE

## 2021-01-13 DIAGNOSIS — Z79.4 TYPE 2 DIABETES MELLITUS WITH COMPLICATION, WITH LONG-TERM CURRENT USE OF INSULIN (HCC): ICD-10-CM

## 2021-01-13 DIAGNOSIS — E11.8 TYPE 2 DIABETES MELLITUS WITH COMPLICATION, WITH LONG-TERM CURRENT USE OF INSULIN (HCC): ICD-10-CM

## 2021-01-13 DIAGNOSIS — E11.9 DIABETES MELLITUS TYPE 2 IN NONOBESE (HCC): ICD-10-CM

## 2021-01-13 RX ORDER — INSULIN LISPRO PROTAMIN/LISPRO 75-25/ML
VIAL (ML) SUBCUTANEOUS
Qty: 90 ML | Refills: 3 | Status: SHIPPED | OUTPATIENT
Start: 2021-01-13 | End: 2022-03-14

## 2021-07-13 DIAGNOSIS — Z79.4 TYPE 2 DIABETES MELLITUS WITH COMPLICATION, WITH LONG-TERM CURRENT USE OF INSULIN (HCC): Primary | ICD-10-CM

## 2021-07-13 DIAGNOSIS — E11.8 TYPE 2 DIABETES MELLITUS WITH COMPLICATION, WITH LONG-TERM CURRENT USE OF INSULIN (HCC): Primary | ICD-10-CM

## 2021-07-20 DIAGNOSIS — E11.8 TYPE 2 DIABETES MELLITUS WITH COMPLICATION, WITH LONG-TERM CURRENT USE OF INSULIN (HCC): ICD-10-CM

## 2021-07-20 DIAGNOSIS — Z79.4 TYPE 2 DIABETES MELLITUS WITH COMPLICATION, WITH LONG-TERM CURRENT USE OF INSULIN (HCC): ICD-10-CM

## 2021-07-20 RX ORDER — BLOOD SUGAR DIAGNOSTIC
STRIP MISCELLANEOUS 2 TIMES DAILY
Qty: 90 EACH | Refills: 2 | Status: SHIPPED | OUTPATIENT
Start: 2021-07-20 | End: 2022-01-14

## 2021-07-20 NOTE — TELEPHONE ENCOUNTER
This is a duplicate refill request  For insulin syringe-needles  Dr Zeina Briseno refilled this prescription on 7/13/2021  I called [employer] and spoke with [facility]  She stated that the order from 7/13/2021 was not received  The refill will be approved

## 2021-07-22 RX ORDER — BLOOD SUGAR DIAGNOSTIC
STRIP MISCELLANEOUS
OUTPATIENT
Start: 2021-07-22

## 2022-01-14 DIAGNOSIS — Z79.4 TYPE 2 DIABETES MELLITUS WITH COMPLICATION, WITH LONG-TERM CURRENT USE OF INSULIN (HCC): ICD-10-CM

## 2022-01-14 DIAGNOSIS — E11.8 TYPE 2 DIABETES MELLITUS WITH COMPLICATION, WITH LONG-TERM CURRENT USE OF INSULIN (HCC): ICD-10-CM

## 2022-01-14 RX ORDER — PEN NEEDLE, DIABETIC 29 G X1/2"
NEEDLE, DISPOSABLE MISCELLANEOUS
Qty: 100 EACH | Refills: 0 | Status: SHIPPED | OUTPATIENT
Start: 2022-01-14 | End: 2022-03-14

## 2022-05-02 ENCOUNTER — APPOINTMENT (EMERGENCY)
Dept: CT IMAGING | Facility: HOSPITAL | Age: 87
DRG: 065 | End: 2022-05-02
Payer: MEDICARE

## 2022-05-02 ENCOUNTER — TELEPHONE (OUTPATIENT)
Dept: INTERNAL MEDICINE CLINIC | Facility: CLINIC | Age: 87
End: 2022-05-02

## 2022-05-02 ENCOUNTER — APPOINTMENT (EMERGENCY)
Dept: RADIOLOGY | Facility: HOSPITAL | Age: 87
DRG: 065 | End: 2022-05-02
Payer: MEDICARE

## 2022-05-02 ENCOUNTER — HOSPITAL ENCOUNTER (INPATIENT)
Facility: HOSPITAL | Age: 87
LOS: 10 days | Discharge: NURSING, REHAB OR LONG-TERM CARE | DRG: 065 | End: 2022-05-12
Attending: EMERGENCY MEDICINE | Admitting: INTERNAL MEDICINE
Payer: MEDICARE

## 2022-05-02 DIAGNOSIS — G30.1 LATE ONSET ALZHEIMER'S DISEASE WITHOUT BEHAVIORAL DISTURBANCE (HCC): ICD-10-CM

## 2022-05-02 DIAGNOSIS — R26.2 AMBULATORY DYSFUNCTION: ICD-10-CM

## 2022-05-02 DIAGNOSIS — R29.90 STROKE-LIKE SYMPTOMS: ICD-10-CM

## 2022-05-02 DIAGNOSIS — E11.9 TYPE 2 DIABETES MELLITUS WITHOUT COMPLICATION, WITH LONG-TERM CURRENT USE OF INSULIN (HCC): ICD-10-CM

## 2022-05-02 DIAGNOSIS — R53.1 RIGHT SIDED WEAKNESS: Primary | ICD-10-CM

## 2022-05-02 DIAGNOSIS — F02.80 LATE ONSET ALZHEIMER'S DISEASE WITHOUT BEHAVIORAL DISTURBANCE (HCC): ICD-10-CM

## 2022-05-02 DIAGNOSIS — Z79.4 TYPE 2 DIABETES MELLITUS WITHOUT COMPLICATION, WITH LONG-TERM CURRENT USE OF INSULIN (HCC): ICD-10-CM

## 2022-05-02 DIAGNOSIS — N30.00 ACUTE CYSTITIS WITHOUT HEMATURIA: ICD-10-CM

## 2022-05-02 PROBLEM — R35.0 URINARY FREQUENCY: Status: ACTIVE | Noted: 2022-05-02

## 2022-05-02 LAB
ALBUMIN SERPL BCP-MCNC: 3.2 G/DL (ref 3.5–5)
ALP SERPL-CCNC: 132 U/L (ref 46–116)
ALT SERPL W P-5'-P-CCNC: 24 U/L (ref 12–78)
ANION GAP SERPL CALCULATED.3IONS-SCNC: 10 MMOL/L (ref 4–13)
AST SERPL W P-5'-P-CCNC: 26 U/L (ref 5–45)
BASOPHILS # BLD AUTO: 0.04 THOUSANDS/ΜL (ref 0–0.1)
BASOPHILS NFR BLD AUTO: 0 % (ref 0–1)
BILIRUB SERPL-MCNC: 0.46 MG/DL (ref 0.2–1)
BUN SERPL-MCNC: 20 MG/DL (ref 5–25)
CALCIUM ALBUM COR SERPL-MCNC: 9.4 MG/DL (ref 8.3–10.1)
CALCIUM SERPL-MCNC: 8.8 MG/DL (ref 8.3–10.1)
CARDIAC TROPONIN I PNL SERPL HS: 23 NG/L
CHLORIDE SERPL-SCNC: 99 MMOL/L (ref 100–108)
CO2 SERPL-SCNC: 25 MMOL/L (ref 21–32)
CREAT SERPL-MCNC: 0.99 MG/DL (ref 0.6–1.3)
EOSINOPHIL # BLD AUTO: 0.18 THOUSAND/ΜL (ref 0–0.61)
EOSINOPHIL NFR BLD AUTO: 2 % (ref 0–6)
ERYTHROCYTE [DISTWIDTH] IN BLOOD BY AUTOMATED COUNT: 13.6 % (ref 11.6–15.1)
GFR SERPL CREATININE-BSD FRML MDRD: 49 ML/MIN/1.73SQ M
GLUCOSE SERPL-MCNC: 152 MG/DL (ref 65–140)
HCT VFR BLD AUTO: 38.6 % (ref 34.8–46.1)
HGB BLD-MCNC: 13.1 G/DL (ref 11.5–15.4)
IMM GRANULOCYTES # BLD AUTO: 0.03 THOUSAND/UL (ref 0–0.2)
IMM GRANULOCYTES NFR BLD AUTO: 0 % (ref 0–2)
LYMPHOCYTES # BLD AUTO: 1.37 THOUSANDS/ΜL (ref 0.6–4.47)
LYMPHOCYTES NFR BLD AUTO: 13 % (ref 14–44)
MCH RBC QN AUTO: 29 PG (ref 26.8–34.3)
MCHC RBC AUTO-ENTMCNC: 33.9 G/DL (ref 31.4–37.4)
MCV RBC AUTO: 85 FL (ref 82–98)
MONOCYTES # BLD AUTO: 0.75 THOUSAND/ΜL (ref 0.17–1.22)
MONOCYTES NFR BLD AUTO: 7 % (ref 4–12)
NEUTROPHILS # BLD AUTO: 8.44 THOUSANDS/ΜL (ref 1.85–7.62)
NEUTS SEG NFR BLD AUTO: 78 % (ref 43–75)
NRBC BLD AUTO-RTO: 0 /100 WBCS
NT-PROBNP SERPL-MCNC: 982 PG/ML
PLATELET # BLD AUTO: 206 THOUSANDS/UL (ref 149–390)
PMV BLD AUTO: 11.4 FL (ref 8.9–12.7)
POTASSIUM SERPL-SCNC: 3.7 MMOL/L (ref 3.5–5.3)
PROT SERPL-MCNC: 6.8 G/DL (ref 6.4–8.2)
RBC # BLD AUTO: 4.52 MILLION/UL (ref 3.81–5.12)
SODIUM SERPL-SCNC: 134 MMOL/L (ref 136–145)
WBC # BLD AUTO: 10.81 THOUSAND/UL (ref 4.31–10.16)

## 2022-05-02 PROCEDURE — 80053 COMPREHEN METABOLIC PANEL: CPT | Performed by: EMERGENCY MEDICINE

## 2022-05-02 PROCEDURE — 93005 ELECTROCARDIOGRAM TRACING: CPT

## 2022-05-02 PROCEDURE — 99285 EMERGENCY DEPT VISIT HI MDM: CPT | Performed by: PHYSICIAN ASSISTANT

## 2022-05-02 PROCEDURE — 99223 1ST HOSP IP/OBS HIGH 75: CPT

## 2022-05-02 PROCEDURE — 36415 COLL VENOUS BLD VENIPUNCTURE: CPT

## 2022-05-02 PROCEDURE — 84484 ASSAY OF TROPONIN QUANT: CPT | Performed by: EMERGENCY MEDICINE

## 2022-05-02 PROCEDURE — 1124F ACP DISCUSS-NO DSCNMKR DOCD: CPT | Performed by: PHYSICIAN ASSISTANT

## 2022-05-02 PROCEDURE — 70496 CT ANGIOGRAPHY HEAD: CPT

## 2022-05-02 PROCEDURE — 85025 COMPLETE CBC W/AUTO DIFF WBC: CPT | Performed by: EMERGENCY MEDICINE

## 2022-05-02 PROCEDURE — 83880 ASSAY OF NATRIURETIC PEPTIDE: CPT | Performed by: EMERGENCY MEDICINE

## 2022-05-02 PROCEDURE — 71045 X-RAY EXAM CHEST 1 VIEW: CPT

## 2022-05-02 PROCEDURE — 99285 EMERGENCY DEPT VISIT HI MDM: CPT

## 2022-05-02 RX ORDER — OLANZAPINE 10 MG/1
5 INJECTION, POWDER, LYOPHILIZED, FOR SOLUTION INTRAMUSCULAR ONCE
Status: COMPLETED | OUTPATIENT
Start: 2022-05-02 | End: 2022-05-03

## 2022-05-02 RX ADMIN — IOHEXOL 85 ML: 350 INJECTION, SOLUTION INTRAVENOUS at 21:21

## 2022-05-02 NOTE — TELEPHONE ENCOUNTER
Patients on called and said that pt fell on Saturday and has no balance she can only stand for up to 15 seconds and her left side is not working I advised him to call an ambulance since he stated that he didn't think he could get her in the car, he want to talk to you before doing anything

## 2022-05-03 ENCOUNTER — APPOINTMENT (INPATIENT)
Dept: NON INVASIVE DIAGNOSTICS | Facility: CLINIC | Age: 87
DRG: 065 | End: 2022-05-03
Payer: MEDICARE

## 2022-05-03 ENCOUNTER — APPOINTMENT (INPATIENT)
Dept: MRI IMAGING | Facility: HOSPITAL | Age: 87
DRG: 065 | End: 2022-05-03
Payer: MEDICARE

## 2022-05-03 ENCOUNTER — TELEPHONE (OUTPATIENT)
Dept: INTERNAL MEDICINE CLINIC | Facility: CLINIC | Age: 87
End: 2022-05-03

## 2022-05-03 ENCOUNTER — APPOINTMENT (INPATIENT)
Dept: NON INVASIVE DIAGNOSTICS | Facility: HOSPITAL | Age: 87
DRG: 065 | End: 2022-05-03
Payer: MEDICARE

## 2022-05-03 ENCOUNTER — APPOINTMENT (INPATIENT)
Dept: RADIOLOGY | Facility: HOSPITAL | Age: 87
DRG: 065 | End: 2022-05-03
Payer: MEDICARE

## 2022-05-03 PROBLEM — E87.1 HYPONATREMIA: Status: ACTIVE | Noted: 2022-05-03

## 2022-05-03 LAB
AORTIC ROOT: 3.2 CM
AORTIC VALVE MEAN VELOCITY: 25.2 M/S
APICAL FOUR CHAMBER EJECTION FRACTION: 64 %
AV MEAN GRADIENT: 29 MMHG
AV PEAK GRADIENT: 45 MMHG
BACTERIA UR QL AUTO: NORMAL /HPF
BILIRUB UR QL STRIP: NEGATIVE
CHOLEST SERPL-MCNC: 287 MG/DL
CLARITY UR: ABNORMAL
COLOR UR: YELLOW
DOP CALC AO PEAK VEL: 3.34 M/S
DOP CALC AO VTI: 70.32 CM
DOP CALC LVOT AREA: 3.14 CM2
DOP CALC LVOT DIAMETER: 2 CM
EST. AVERAGE GLUCOSE BLD GHB EST-MCNC: 189 MG/DL
FRACTIONAL SHORTENING: 40 % (ref 28–44)
GLUCOSE SERPL-MCNC: 154 MG/DL (ref 65–140)
GLUCOSE SERPL-MCNC: 155 MG/DL (ref 65–140)
GLUCOSE SERPL-MCNC: 227 MG/DL (ref 65–140)
GLUCOSE SERPL-MCNC: 259 MG/DL (ref 65–140)
GLUCOSE SERPL-MCNC: 41 MG/DL (ref 65–140)
GLUCOSE UR STRIP-MCNC: NEGATIVE MG/DL
HBA1C MFR BLD: 8.2 %
HDLC SERPL-MCNC: 94 MG/DL
HGB UR QL STRIP.AUTO: NEGATIVE
INTERVENTRICULAR SEPTUM IN DIASTOLE (PARASTERNAL SHORT AXIS VIEW): 0.8 CM
INTERVENTRICULAR SEPTUM: 0.8 CM (ref 0.48–0.91)
KETONES UR STRIP-MCNC: NEGATIVE MG/DL
LDLC SERPL CALC-MCNC: 176 MG/DL (ref 0–100)
LEFT ATRIUM SIZE: 4.3 CM
LEFT INTERNAL DIMENSION IN SYSTOLE: 2.6 CM (ref 2.28–3.44)
LEFT VENTRICULAR INTERNAL DIMENSION IN DIASTOLE: 4.3 CM (ref 3.7–5.51)
LEFT VENTRICULAR POSTERIOR WALL IN END DIASTOLE: 1 CM (ref 0.47–0.89)
LEFT VENTRICULAR STROKE VOLUME: 56 ML
LEUKOCYTE ESTERASE UR QL STRIP: ABNORMAL
LVSV (TEICH): 56 ML
NITRITE UR QL STRIP: POSITIVE
NON-SQ EPI CELLS URNS QL MICRO: NORMAL /HPF
PH UR STRIP.AUTO: 6.5 [PH]
PROT UR STRIP-MCNC: NEGATIVE MG/DL
RBC #/AREA URNS AUTO: NORMAL /HPF
SL CV PED ECHO LEFT VENTRICLE DIASTOLIC VOLUME (MOD BIPLANE) 2D: 81 ML
SL CV PED ECHO LEFT VENTRICLE SYSTOLIC VOLUME (MOD BIPLANE) 2D: 26 ML
SP GR UR STRIP.AUTO: <=1.005 (ref 1–1.03)
TRIGL SERPL-MCNC: 83 MG/DL
UROBILINOGEN UR QL STRIP.AUTO: 0.2 E.U./DL
WBC #/AREA URNS AUTO: NORMAL /HPF
Z-SCORE OF INTERVENTRICULAR SEPTUM IN END DIASTOLE: 0.98
Z-SCORE OF LEFT VENTRICULAR DIMENSION IN END DIASTOLE: -0.48
Z-SCORE OF LEFT VENTRICULAR DIMENSION IN END SYSTOLE: -0.58
Z-SCORE OF LEFT VENTRICULAR POSTERIOR WALL IN END DIASTOLE: 2.94

## 2022-05-03 PROCEDURE — 80061 LIPID PANEL: CPT

## 2022-05-03 PROCEDURE — 93321 DOPPLER ECHO F-UP/LMTD STD: CPT | Performed by: INTERNAL MEDICINE

## 2022-05-03 PROCEDURE — 97163 PT EVAL HIGH COMPLEX 45 MIN: CPT

## 2022-05-03 PROCEDURE — 97167 OT EVAL HIGH COMPLEX 60 MIN: CPT

## 2022-05-03 PROCEDURE — 99223 1ST HOSP IP/OBS HIGH 75: CPT | Performed by: PSYCHIATRY & NEUROLOGY

## 2022-05-03 PROCEDURE — 92610 EVALUATE SWALLOWING FUNCTION: CPT

## 2022-05-03 PROCEDURE — 99233 SBSQ HOSP IP/OBS HIGH 50: CPT | Performed by: INTERNAL MEDICINE

## 2022-05-03 PROCEDURE — 81001 URINALYSIS AUTO W/SCOPE: CPT

## 2022-05-03 PROCEDURE — 82948 REAGENT STRIP/BLOOD GLUCOSE: CPT

## 2022-05-03 PROCEDURE — 93308 TTE F-UP OR LMTD: CPT | Performed by: INTERNAL MEDICINE

## 2022-05-03 PROCEDURE — 83036 HEMOGLOBIN GLYCOSYLATED A1C: CPT

## 2022-05-03 PROCEDURE — G1004 CDSM NDSC: HCPCS

## 2022-05-03 PROCEDURE — 70551 MRI BRAIN STEM W/O DYE: CPT

## 2022-05-03 PROCEDURE — 93325 DOPPLER ECHO COLOR FLOW MAPG: CPT | Performed by: INTERNAL MEDICINE

## 2022-05-03 PROCEDURE — 93308 TTE F-UP OR LMTD: CPT

## 2022-05-03 PROCEDURE — 36415 COLL VENOUS BLD VENIPUNCTURE: CPT

## 2022-05-03 RX ORDER — ASPIRIN 325 MG
325 TABLET ORAL ONCE
Status: COMPLETED | OUTPATIENT
Start: 2022-05-03 | End: 2022-05-03

## 2022-05-03 RX ORDER — ATORVASTATIN CALCIUM 40 MG/1
40 TABLET, FILM COATED ORAL EVERY EVENING
Status: DISCONTINUED | OUTPATIENT
Start: 2022-05-03 | End: 2022-05-12 | Stop reason: HOSPADM

## 2022-05-03 RX ORDER — INSULIN ASPART 100 [IU]/ML
30 INJECTION, SUSPENSION SUBCUTANEOUS EVERY MORNING
Status: DISCONTINUED | OUTPATIENT
Start: 2022-05-03 | End: 2022-05-03

## 2022-05-03 RX ORDER — DEXTROSE MONOHYDRATE 25 G/50ML
INJECTION, SOLUTION INTRAVENOUS
Status: COMPLETED
Start: 2022-05-03 | End: 2022-05-03

## 2022-05-03 RX ORDER — INSULIN ASPART 100 [IU]/ML
20 INJECTION, SUSPENSION SUBCUTANEOUS EVERY MORNING
Status: DISCONTINUED | OUTPATIENT
Start: 2022-05-04 | End: 2022-05-12

## 2022-05-03 RX ORDER — CLOPIDOGREL BISULFATE 75 MG/1
75 TABLET ORAL DAILY
Status: DISCONTINUED | OUTPATIENT
Start: 2022-05-03 | End: 2022-05-12 | Stop reason: HOSPADM

## 2022-05-03 RX ORDER — SODIUM CHLORIDE 9 MG/ML
50 INJECTION, SOLUTION INTRAVENOUS CONTINUOUS
Status: DISCONTINUED | OUTPATIENT
Start: 2022-05-03 | End: 2022-05-05

## 2022-05-03 RX ORDER — ENOXAPARIN SODIUM 100 MG/ML
40 INJECTION SUBCUTANEOUS DAILY
Status: DISCONTINUED | OUTPATIENT
Start: 2022-05-03 | End: 2022-05-11

## 2022-05-03 RX ORDER — INSULIN ASPART 100 [IU]/ML
15 INJECTION, SUSPENSION SUBCUTANEOUS EVERY EVENING
Status: DISCONTINUED | OUTPATIENT
Start: 2022-05-04 | End: 2022-05-12

## 2022-05-03 RX ORDER — LORAZEPAM 2 MG/ML
2 INJECTION INTRAMUSCULAR ONCE
Status: COMPLETED | OUTPATIENT
Start: 2022-05-03 | End: 2022-05-03

## 2022-05-03 RX ORDER — INSULIN LISPRO 100 [IU]/ML
1-5 INJECTION, SOLUTION INTRAVENOUS; SUBCUTANEOUS
Status: DISCONTINUED | OUTPATIENT
Start: 2022-05-03 | End: 2022-05-12 | Stop reason: HOSPADM

## 2022-05-03 RX ORDER — ASPIRIN 81 MG/1
81 TABLET, CHEWABLE ORAL DAILY
Status: DISCONTINUED | OUTPATIENT
Start: 2022-05-04 | End: 2022-05-12 | Stop reason: HOSPADM

## 2022-05-03 RX ORDER — INSULIN ASPART 100 [IU]/ML
30 INJECTION, SUSPENSION SUBCUTANEOUS EVERY EVENING
Status: DISCONTINUED | OUTPATIENT
Start: 2022-05-04 | End: 2022-05-03

## 2022-05-03 RX ADMIN — ASPIRIN 325 MG ORAL TABLET 325 MG: 325 PILL ORAL at 00:51

## 2022-05-03 RX ADMIN — SODIUM CHLORIDE 50 ML/HR: 0.9 INJECTION, SOLUTION INTRAVENOUS at 21:58

## 2022-05-03 RX ADMIN — LORAZEPAM 1 MG: 2 INJECTION INTRAMUSCULAR; INTRAVENOUS at 13:19

## 2022-05-03 RX ADMIN — INSULIN ASPART 30 UNITS: 100 INJECTION, SUSPENSION SUBCUTANEOUS at 10:01

## 2022-05-03 RX ADMIN — ATORVASTATIN CALCIUM 40 MG: 40 TABLET, FILM COATED ORAL at 00:51

## 2022-05-03 RX ADMIN — ATORVASTATIN CALCIUM 40 MG: 40 TABLET, FILM COATED ORAL at 18:25

## 2022-05-03 RX ADMIN — SODIUM CHLORIDE 50 ML/HR: 0.9 INJECTION, SOLUTION INTRAVENOUS at 00:57

## 2022-05-03 RX ADMIN — CLOPIDOGREL BISULFATE 75 MG: 75 TABLET ORAL at 18:25

## 2022-05-03 RX ADMIN — ENOXAPARIN SODIUM 40 MG: 40 INJECTION SUBCUTANEOUS at 09:32

## 2022-05-03 RX ADMIN — WATER 10 ML: 1 INJECTION INTRAMUSCULAR; INTRAVENOUS; SUBCUTANEOUS at 00:45

## 2022-05-03 RX ADMIN — INSULIN LISPRO 2 UNITS: 100 INJECTION, SOLUTION INTRAVENOUS; SUBCUTANEOUS at 09:32

## 2022-05-03 RX ADMIN — DEXTROSE MONOHYDRATE 50 ML: 25 INJECTION, SOLUTION INTRAVENOUS at 15:56

## 2022-05-03 RX ADMIN — SODIUM CHLORIDE 50 ML/HR: 0.9 INJECTION, SOLUTION INTRAVENOUS at 14:15

## 2022-05-03 RX ADMIN — OLANZAPINE 5 MG: 10 INJECTION, POWDER, FOR SOLUTION INTRAMUSCULAR at 00:44

## 2022-05-03 RX ADMIN — INSULIN LISPRO 2 UNITS: 100 INJECTION, SOLUTION INTRAVENOUS; SUBCUTANEOUS at 21:54

## 2022-05-03 NOTE — PLAN OF CARE
Problem: OCCUPATIONAL THERAPY ADULT  Goal: Performs self-care activities at highest level of function for planned discharge setting  See evaluation for individualized goals  Description: Treatment Interventions: ADL retraining,Functional transfer training,UE strengthening/ROM,Endurance training,Patient/family training,Equipment evaluation/education,Fine motor coordination activities,Compensatory technique education,Continued evaluation,Energy conservation          See flowsheet documentation for full assessment, interventions and recommendations  Note: Limitation: Decreased ADL status,Decreased UE strength,Decreased endurance,Decreased fine motor control,Decreased high-level ADLs  Prognosis: Good  Assessment: Patient is a 80 yr  old female who was seen for an OT evaluation s/p admission to [place] [address] on 5/2/22 due to drowsiness, dizziness, inability to walk, and left-sided weakness  Patient was diagnosed with stroke-like symptoms  Other contributing factors affecting the patients occupational performance at this time include hyponatremia, urinary frequency, late onset alzheimer's disease without behavioral disturbance, congestive heart failure, and type 2 diabetes  Orders were placed for OT evaluation and treatment  At least two patient identifiers performed during session including name and wristband  Prior to admission patient reported independent with ADLs and functional mobility but required assistance for IADLs  Patient lives in a two story house with her family which has one step to enter with a half bath on the main level and full flight to second floor bed/bath upstairs  Patient ambulates with the use of a walker and and reports about two falls in the last 6 months  Patient ambulates with the use of a walker and and reports about two falls in the last 6 months   Personal factors that are inhibiting the patients performance at time of evaluation include: step to enter, difficulty performing ADLs and difficulty performing IADLs  Upon evaluation patient requires Mod A x 2 for functional mobility, Min A for UB ADLs, Mod A for LB ADLs, and setup assist for feeding and grooming tasks  Patients occupational performance is affected by the following deficits: limited UE strength, impaired fine/gross motor coordination, decreased static/dynamic sitting balance, decreased static/dynamic standing balance, decreased activity tolerance, decreased endurance, decreased cognition, and impaired short term memory  Patient to benefit from continued Occupational Therapy treatment while in the hospital to address the stated deficits and improve occupational performance and overall quality of life   From an OT standpoint at this time, d/c recommendation would be post acute rehab services     OT Discharge Recommendation: Post acute rehabilitation services

## 2022-05-03 NOTE — ASSESSMENT & PLAN NOTE
· Son reports increasing urinary frequency since Friday without fever  · UA currently pending  · Due to dementia as above patient is unable to provide detailed history

## 2022-05-03 NOTE — ASSESSMENT & PLAN NOTE
Son reports increased drowsiness, dizziness, inability to walk, and left sided weakness ("she won't move her left foot" and "her left hand is weak") starting Saturday  Due to underlying dementia and agitation ROS or history is unable to be obtained from patient  /69 (BP Location: Right arm)   Pulse 99   Temp 97 9 °F (36 6 °C) (Temporal)   Resp 18   Ht 5' (1 524 m)   Wt 57 6 kg (126 lb 15 8 oz)   SpO2 98%   BMI 24 80 kg/m²     · Son reports increased drowsiness, dizziness, inability to walk, and left sided weakness ("she won't move her left foot" and "her left hand is weak") starting Saturday  · Son reports patient was at her normal ambulatory baseline Friday (walker), then noticed complete inability to ambulate Saturday  · ED note mentions right arm weakness, however patient not currently able to cooperate with full physical exam  · Not hypoglycemic; has mild hyponatremia as below without other major electrolyte disturbance     · CTA without evidence for acute infarct, bleed, or mass effect, however is showing 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

## 2022-05-03 NOTE — OCCUPATIONAL THERAPY NOTE
Cardiology Progress Note     Patient Name: Rishabh Cruz  Medical Record Number: [id number]  YOB: 1969   Date of Admission: 7/11/2017      History of Present Illness  Rishabh Cruz is a 48 year old male with no prior medical history who transferred from [facility] [place] for further evaluation and management of newly diagnosed liver cirrhosis, BRB per rectum, and abdominal ascites.      The patient reports being in normal state of health 2 weeks prior to admission. He was initially admitted 6/28-6/30 for SOB, abdominal distention, BLE edema and rectal bleeding. During that admission, workup c/w liver cirrhosis, presumed ETOH. Pt had a paracentesis 6/28 with removal of 6.2L. EGD demonstrated grade 2 EV and portal HTN gastropathy. Upon presentation to the [place] ED yesterday, he was noted to have profound hyponatremia (116), hyperkalemia (5.7) and ARF (Cr 3.09). He was transferred to [facility] and transplant hepatology has since been consulted for decompensated liver cirrhosis and has initiated OLT evaluation. Cardiology has been asked to provide cardiac risk stratification as part of OLT eval.      Baseline he is active, works as  that involves a lot of walking. Can walk 2 miles with no chest pain, but gets tired easily, mild dypnea. Has started having BLE edema and abdominal ascites 2 weeks ago. Then admitted with GI bleed.    Subjective/Interval Events  No active complaints; denies CP, SOB.       ALLERGIES: no known allergies.    SCHEDULED MEDS  • pantoprazole  40 mg Oral Daily   • phytonadione (Vitamin K)  5 mg Oral Daily   • cholecalciferol  2,000 Units Oral Daily   • sodium chloride (PF)  2 mL Injection 2 times per day   • folic acid  1 mg Oral Daily   • thiamine  100 mg Oral Daily   • vitamin - therapeutic multivitamins w/minerals  1 tablet Oral Daily   • cefTRIAXone (ROCEPHIN) IVPB  1,000 mg Intravenous 2 times per day     CONTINUOUS INFUSIONS  • sodium chloride 0.9% infusion     • sodium  Occupational Therapy Evaluation        Patient Name: Antony BLUE Date: 5/3/2022       05/03/22 0831   OT Last Visit   OT Visit Date 05/03/22   Note Type   Note type Evaluation   Restrictions/Precautions   Weight Bearing Precautions Per Order No   Braces or Orthoses Other (Comment)  (none per patient)   Other Precautions 1:1;Cognitive; Restraints;Multiple lines;Telemetry; Fall Risk   Pain Assessment   Pain Assessment Tool FLACC   Pain Score No Pain   Pain Rating: FLACC (Rest) - Face 0   Pain Rating: FLACC (Rest) - Legs 0   Pain Rating: FLACC (Rest) - Activity 0   Pain Rating: FLACC (Rest) - Cry 0   Pain Rating: FLACC (Rest) - Consolability 0   Score: FLACC (Rest) 0   Pain Rating: FLACC (Activity) - Face 0   Pain Rating: FLACC (Activity) - Legs 0   Pain Rating: FLACC (Activity) - Activity 0   Pain Rating: FLACC (Activity) - Cry 0   Pain Rating: FLACC (Activity) - Consolability 0   Score: FLACC (Activity) 0   Home Living   Type of Home House   Home Layout Two level;Bed/bath upstairs;1/2 bath on main level  (1 LASHAE)   Bathroom Shower/Tub Walk-in shower   Bathroom Toilet Standard   Bathroom Equipment Other (Comment)  (none per pt )   Bathroom Accessibility Accessible   Home Equipment Walker   Additional Comments Pt  ambulates with walker at baseline   Prior Function   Level of [place] Independent with ADLs and functional mobility; Needs assistance with IADLs   Lives With Son;Spouse  (per chart review)   Receives Help From Family   ADL Assistance Independent   IADLs Needs assistance   Falls in the last 6 months 1 to 4  ("maybe 2" per patient)   Vocational Retired   Comments Pt is a poor historian; information obtained from patient and chart review; further clarification is required   Lifestyle   Autonomy Patient reported independent with ADLs and functional mobility but required assistance for IADLs   Patient lives in a two story house with her family which has one step to enter with a half bath on chloride 0.9% infusion     • sodium chloride 0.9% infusion     • octreotide (SandoSTATIN) 500 mcg in sodium chloride 0.9 % 250 mL standard concentration infusion 50 mcg/hr (07/13/17 0238)       Vital Last Value 24 Hour Range   Temperature 98.6 °F (37 °C) Temp  Min: 98 °F (36.7 °C)  Max: 98.7 °F (37.1 °C)   Pulse 72 Pulse  Min: 62  Max: 72   Respiratory 12 Resp  Min: 12  Max: 19   Blood Pressure 110/64 BP  Min: 110/64  Max: 133/71   Arterial BP   No Data Recorded   O2 Sat   NA   Pulse Oximetry 99 % SpO2  Min: 99 %  Max: 100 %     Vital Today Admitted   Weight 101.2 kg Weight: 100.9 kg   BMI N/A BMI (Calculated): 37.09     Weight    07/12/17 0541 07/13/17 0459   Weight: 100.9 kg 101.2 kg       Intake/Output:    I/O last 3 completed shifts:  In: 2123 [P.O.:1280; I.V.:693; IV Piggyback:150]  Out: 7390 [Urine:4040; Drains:3350]      Intake/Output Summary (Last 24 hours) at 07/13/17 1204  Last data filed at 07/13/17 1100   Gross per 24 hour   Intake             1583 ml   Output             5385 ml   Net            -3802 ml       Physical Exam  Constitutional: Resting and ill-appearing  48 year old male in no acute distress  Skin: +jaundice. +extensive bruising BUE-no TTP.   HEENT: Normocephalic, atraumatic, oral mucous membranes moist, no masses EOMs intact.  Neck: Supple, trachea midline, negative JVD or HJR at 45 degrees, negative carotid bruits bilaterally  Cardiovascular: RRR normal S1S2. No murmur. No thrill.  Respiratory: LS CTAB no wheezes rales or accessory muscle use   Abdomen: Soft, nontender, +bowel tones x 4 quadrants, negative hepatosplenomegaly  Musculoskeletal/Extremities: CRT <3, no clubbing, no cyanosis,  2+ pitting BLE  Neurological: Alert and oriented to person, place and time    LABS AND DIAGNOSTICS    Recent Labs  Lab 07/13/17  0450 07/12/17  1830 07/12/17  1235 07/12/17  0325 07/11/17  2157 07/11/17  1645 07/11/17  0850 07/11/17  0350 07/08/17  1325 07/08/17  1315   SODIUM 127*  --  120* 120*  --    the main level and full flight to second floor bed/bath upstairs  Patient ambulates with the use of a walker and and reports about two falls in the last 6 months  Reciprocal Relationships supportive family   Psychosocial   Psychosocial (WDL) WDL   Subjective   Subjective "I get dressed and shower all by myself"   ADL   Eating Assistance 5  Supervision/Setup   Grooming Assistance 5  Supervision/Setup   UB Bathing Assistance 4  Minimal Assistance   LB Bathing Assistance 2  Maximal Assistance   UB Dressing Assistance 4  Minimal Assistance   LB Dressing Assistance 2  Maximal [address]  3  Moderate Assistance   Functional Assistance 3  Moderate Assistance   Bed Mobility   Supine to Sit 4  Minimal assistance   Additional items Assist x 1;HOB elevated; Increased time required;Verbal cues   Sit to Supine 3  Moderate assistance   Additional items Assist x 2; Increased time required;Verbal cues;LE management   Transfers   Sit to Stand 4  Minimal assistance   Additional items Assist x 2; Increased time required;Verbal cues   Stand to Sit 4  Minimal assistance   Additional items Assist x 2; Increased time required;Verbal cues   Additional Comments Pt  ambulated ~10 steps with RW and Mod x 2    Functional Mobility   Functional Mobility 3  Moderate assistance  (Mod x 2)   Additional items Rolling walker   Balance   Static Sitting Fair +   Dynamic Sitting Fair -   Static Standing Fair -  (w/RW)   Dynamic Standing Poor  (w/RW)   Ambulatory Poor  (w/RW)   Activity Tolerance   Activity Tolerance Patient limited by fatigue   Medical Staff Made Aware care coordinated with PT Laura AKHTAR Assessment   RUE Assessment X  (AROM grossly WFL)   RUE Strength   RUE Overall Strength Deficits  (overall strength 3+/5)   LUE Assessment   LUE Assessment X  (AROM grossly WFL)   LUE Strength   LUE Overall Strength Deficits  (3+/5)   Hand Function   Gross Motor Coordination Impaired   Fine Motor Coordination Impaired   Sensation --   --  116*  --  123*   POTASSIUM 4.7  --   --  4.9  --  5.5* 5.5* 5.7*  --  4.7   CHLORIDE 93*  --   --  88*  --   --   --  86*  --  92*   CO2 23  --   --  25  --   --   --  22  --  26   ANIONGAP 16  --   --  12  --   --   --  14  --  10   BUN 34*  --   --  43*  --   --   --  46*  --  31*   CREATININE 1.88*  --   --  2.64*  --   --   --  3.09*  --  2.07*   GLUCOSE 103*  --   --  86  --   --   --  94  --  88   PHOS 3.8  --   --  5.0*  --   --  5.1*  --   --   --    BILIRUBIN 18.3*  --   --  17.5*  --   --   --  18.5*  --  14.6*   AST 98*  --   --  96*  --   --   --  158*  --  125*   LACTA  --   --   --   --   --   --   --   --  2.2*  --    WBC 5.6  --   --  6.2  --   --   --  11.1*  --  7.1   HGB 6.9* 7.5*  --  7.5* 7.6* 8.7* 9.5* 10.4*  --  9.9*   HCT 19.5* 20.7*  --  20.4* 20.6* 24.3* 26.3* 28.9*  --  27.6*   PLT 58*  --   --  60*  --   --   --  99*  --  89*     No results found for: CHOLESTEROL  No results found for: HDL  No results found for: TRIGLYCERIDE  No results found for: CALCLDL    TROPONIN  No results found for: CPK, MMB, RAPDTR    IMAGING    ECHO:  Normal left ventricular size with no regional wall motion abnormalities. LVEF, 60 %.  Basal septal hypertrophy. Normal diastolic function.  Normal right ventricular size and systolic function. PASP = 31 mmHg.  No significant valve abnormalities.    Stress Test:   1. No evidence of significant ischemia with pharmacologic stress  2. Normal post-regadenoson LV systolic function.  3. Cardiac Risk Assessment: Low.   4. No previous study for comparison    Assessment  Cardiac Risk Stratification in the Setting of OLT Evaluation   Decompensated liver cirrhosis, presumed ETOH induced  Coagulopathy of liver disease  Acute kidney injury   Acute blood loss anemia 2/2 GI bleed   Electrolyte imbalance    Recommendations  49 yo M admitted with newly diagnosed, decompensated liver cirrhosis and acute blood loss anemia 2/2 GIB. Transplant hepatology has evaluated the pt and  Light Touch No apparent deficits  (BUEs)   Proprioception   Proprioception No apparent deficits  (BUEs)   Vision-Basic Assessment   Current Vision Wears contacts   Patient Visual Report Other (Comment)  ("I think its time for another eye appointment")   Cognition   Overall Cognitive Status Impaired   Arousal/Participation Alert; Cooperative   Attention Within functional limits   Orientation Level Oriented to person;Oriented to place;Oriented to situation;Disoriented to time   Memory Decreased short term memory   Following Commands Follows one step commands with increased time or repetition   Assessment   Limitation Decreased ADL status; Decreased UE strength;Decreased endurance;Decreased fine motor control;Decreased high-level ADLs   Prognosis Good   Assessment Patient is a 80 yr  old female who was seen for an OT evaluation s/p admission to [address] [place] [address] on 5/2/22 due to drowsiness, dizziness, inability to walk, and left-sided weakness  Patient was diagnosed with stroke-like symptoms  Other contributing factors affecting the patients occupational performance at this time include hyponatremia, urinary frequency, late onset alzheimer's disease without behavioral disturbance, congestive heart failure, and type 2 diabetes  Orders were placed for OT evaluation and treatment  At least two patient identifiers performed during session including name and wristband  Prior to admission patient reported independent with ADLs and functional mobility but required assistance for IADLs  Patient lives in a two story house with her family which has one step to enter with a half bath on the main level and full flight to second floor bed/bath upstairs  Patient ambulates with the use of a walker and and reports about two falls in the last 6 months  Patient ambulates with the use of a walker and and reports about two falls in the last 6 months   Personal factors that are inhibiting the patients performance at time of evaluation include: step to enter, difficulty performing ADLs and difficulty performing IADLs  Upon evaluation patient requires Mod A x 2 for functional mobility, Min A for UB ADLs, Mod A for LB ADLs, and setup assist for feeding and grooming tasks  Patients occupational performance is affected by the following deficits: limited UE strength, impaired fine/gross motor coordination, decreased static/dynamic sitting balance, decreased static/dynamic standing balance, decreased activity tolerance, decreased endurance, decreased cognition, and impaired short term memory  Patient to benefit from continued Occupational Therapy treatment while in the hospital to address the stated deficits and improve occupational performance and overall quality of life  From an OT standpoint at this time, d/c recommendation would be post acute rehab services   Plan   Treatment Interventions ADL retraining;Functional transfer training;UE strengthening/ROM; Endurance training;Patient/family training;Equipment evaluation/education; Fine motor coordination activities; Compensatory technique education;Continued evaluation; Energy conservation   Goal Expiration Date 05/17/22   OT Frequency 3-5x/wk   Recommendation   OT Discharge Recommendation Post acute rehabilitation services   AM-PAC Daily Activity Inpatient   Lower Body Dressing 2   Bathing 2   Toileting 2   Upper Body Dressing 3   Grooming 3   Eating 3   Daily Activity Raw Score 15   Daily Activity Standardized Score (Calc for Raw Score >=11) 34 69   AM-PAC Applied Cognition Inpatient   Following a Speech/Presentation 3   Understanding Ordinary Conversation 3   Taking Medications 1   Remembering Where Things Are Placed or Put Away 2   Remembering List of 4-5 Errands 1   Taking Care of Complicated Tasks 1   Applied Cognition Raw Score 11   Applied Cognition Standardized Score 27 03   Barthel Index   Feeding 5   Bathing 0   Grooming Score 0   Dressing Score 5   Bladder Score 0   Bowels Score 5 initiated OLT evaluation.     Echo reviewed; preserved LVEF and no RWMA. SPECT stress test without ischemia. The pt is without active cardiac issues. He is considered low risk for cardiac adverse events for transplant. Cardiology will sign off, thank you for involving us in the care of this patient.     Erica JUSTICE/Dr. Nugent   Pager: [phone]  [facility]     7/13/2017  _______________________________________________  I have reviewed the whole case in detail, interviewed and personally examined the patient. I edited the note to reflect my findings. The diagnostic and treatment plan documented above was formulated under my supervision. I have explained and discussed in detail with the patient who has expressed his agreement with this plan.    Taylor Nugent MD     Toilet Use Score 5   Transfers (Bed/Chair) Score 5   Mobility (Level Surface) Score 0   Stairs Score 0   Barthel Index Score 25   Modified Adriana Scale   Modified Adriana Scale 4     Patient will complete grooming tasks at sink with unilateral support at Mod I level  Patient will complete UB dressing seated at EOB at Mod I level  Patient will complete LB bathing tasks seated in shower Mod I with the use of adaptative equipment as necessary  Patient will complete toileting hygiene with supervision assist     Patient will improve standing tolerance to 10-15 min to increase activity tolerance during ADL/IADL tasks  Patient will complete transfers from bed to chair/toilet with Min A and AD as indicated  Patient will follow one step directions with no verbal cues or need for repetition to complete ADL/IADL tasks  Patient will demonstrate OOB/ sitting tolerance to 2-4 hours per day for increased participation in self care and leisure tasks with no exertion  Patient will have [address] - dynamic standing balance for increased participation in ADL/IADL activities  Patient will increase overall UE strength to 4/5 to allow for completion of ADL/IADL tasks

## 2022-05-03 NOTE — ASSESSMENT & PLAN NOTE
79 y/o female with CHF, dementia, DM, who presents with symptoms of left sided weakness, difficulty ambulating, increased drowsiness, dizziness since 4/30  BP on presentation 139/69  Unclear etiology at this time  Stroke pathway pending for further evaluation  On exam, patient appears to have some left sided weakness (although limited due to shoulder pain), questionable left visual field cut, questionable left sided extinction, left UE dysmetria, slight left facial weakness at rest, slurred and slow speech  Exam is limited as patient is a poor historian and exhibits poor effort at times  Plan:  - Stroke pathway   MRI brain   Echo   S/p aspirin 325 mg; continue aspirin 81 mg    Atorvastatin 40 mg   Normotension; avoid hypotension   Modification of vascular risk factors per primary team   Continue telemetry   PT/OT/ST   Frequent neuro checks  Continue to monitor and notify neurology with any changes  - Medical management and supportive care per primary team  Correction of any metabolic or infectious disturbances  Results:  - CTA head and neck:  1  No evidence of acute vascular territorial infarction, intracranial hemorrhage or mass effect  2   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

## 2022-05-03 NOTE — ASSESSMENT & PLAN NOTE
· Patient currently confused, not able to answer questions, is agitated, and actively trying to get out of bed  · Son reports patient is at mental baseline currently although more agitated than usal  · Will give one-time dose IM zyprexa and utilize soft restraint belt to avoid fall at this time  · Son is looking for short term rehab or nursing home placement due to difficulty taking care of the patient at home  · Delirium precautions  · Case management consulted

## 2022-05-03 NOTE — ASSESSMENT & PLAN NOTE
- Delirium precautions  - Promote appropriate sleep/wake cycle  - PT/OT  - Monitor neuro exam; notify with any changes

## 2022-05-03 NOTE — CONSULTS
Consultation - Neurology   Polly Jacobo 80 y o  female MRN: [id number]  Unit/Bed#: ED 15 Encounter: [id number]      Assessment/Plan     * Stroke-like symptoms  Assessment & Plan  81 y/o female with CHF, dementia, DM, who presents with symptoms of left sided weakness, difficulty ambulating, increased drowsiness, dizziness since 4/30  BP on presentation 139/69  Unclear etiology at this time  Stroke pathway pending for further evaluation  On exam, patient appears to have some left sided weakness (although limited due to shoulder pain), questionable left visual field cut, questionable left sided extinction, left UE dysmetria, slight left facial weakness at rest, slurred and slow speech  Exam is limited as patient is a poor historian and exhibits poor effort at times  Plan:  - Stroke pathway   MRI brain   Echo   S/p aspirin 325 mg; continue aspirin 81 mg    Atorvastatin 40 mg   Normotension; avoid hypotension   Modification of vascular risk factors per primary team   Continue telemetry   PT/OT/ST   Frequent neuro checks  Continue to monitor and notify neurology with any changes  - Medical management and supportive care per primary team  Correction of any metabolic or infectious disturbances  Results:  - CTA head and neck:  1  No evidence of acute vascular territorial infarction, intracranial hemorrhage or mass effect  2   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    Late onset Alzheimer's disease without behavioral disturbance (HCC)  Assessment & Plan  - Delirium precautions  - Promote appropriate sleep/wake cycle  - PT/OT  - Monitor neuro exam; notify with any changes    Type 2 diabetes mellitus without complication, with long-term current use of insulin [facility])  Assessment & Plan  Lab Results   Component Value Date    HGBA1C 8 2 (H) 05/03/2022       Recent Labs     05/03/22  0116 05/03/22  0741   POCGLU 154* 227*       Blood Sugar Average: Last 72 hrs:  (P) 190 5     - Medical management per primary team      Recommendations for outpatient neurological follow up have yet to be determined  Case and treatment plan reviewed with attending neurologist, Dr Elizabeth May  History of Present Illness     Reason for Consult / Principal Problem: Stroke-like symptoms  Hx and PE limited by: Poor historian, pain  HPI: Gallito Bronson is a 80 y o   female with CHF, dementia, DM, who presents with stroke-like symptoms  Per chart review, patient presented with weakness and difficulty ambulating  Per son, patient has been having increased drowsiness, dizziness, inability to walk, and left sided weakness (not moving her left foot and left hand weakness) since 4/30  Son reported patient is normally able to ambulate with a walker, which she was doing Friday (day prior to symptom onset)  Patient seen and evaluated at the bedside with attending neurologist  She is a poor historian but reports she is feeling better today  She states that she "kind of fell but didn't fall" the other day  She denies headache, speech difficulty- stating that this is her normal speech, visual changes, UE weakness  She does endorse LE weakness but denies pain or numbness in them  She states her son said she couldn't walk and she thinks he is right  She reports left shoulder and right knee pain  Inpatient consult to Neurology  Consult performed by:  BRIAN Rosen  Consult ordered by: Ellen Triplett PA-C          Review of Systems   Unable to perform ROS: Dementia       Historical Information   Past Medical History:   Diagnosis Date    Aortic valve disorder     CHF [facility]congestive heart failure) ([facility] [address] )     Syncope      Past Surgical History:   Procedure Laterality Date    BLADDER SURGERY      CATARACT EXTRACTION      MASTECTOMY Right     last assessed 7/29/14    TOTAL KNEE ARTHROPLASTY       Social History   Social History     Substance and Sexual Activity   Alcohol Use No     Social History     Substance and Sexual Activity   Drug Use No     E-Cigarette/Vaping    E-Cigarette Use Never User      E-Cigarette/Vaping Substances    Nicotine No     THC No     CBD No     Flavoring No     Other No     Unknown No      Social History     Tobacco Use   Smoking Status Never Smoker   Smokeless Tobacco Never Used   Tobacco Comment    tobacco non user     Family History: History reviewed  No pertinent family history  Review of previous medical records was completed  Meds/Allergies   all current active meds have been reviewed, current meds:   Current Facility-Administered Medications   Medication Dose Route Frequency    [START ON 5/4/2022] aspirin chewable tablet 81 mg  81 mg Oral Daily    atorvastatin (LIPITOR) tablet 40 mg  40 mg Oral QPM    enoxaparin (LOVENOX) subcutaneous injection 40 mg  40 mg Subcutaneous Daily    insulin aspart protamine-insulin aspart (NovoLOG 70/30) 100 units/mL subcutaneous injection 30 Units  30 Units Subcutaneous QAM    [START ON 5/4/2022] insulin aspart protamine-insulin aspart (NovoLOG 70/30) 100 units/mL subcutaneous injection 30 Units  30 Units Subcutaneous QPM    insulin lispro (HumaLOG) 100 units/mL subcutaneous injection 1-5 Units  1-5 Units Subcutaneous TID AC    insulin lispro (HumaLOG) 100 units/mL subcutaneous injection 1-5 Units  1-5 Units Subcutaneous HS    sodium chloride 0 9 % infusion  50 mL/hr Intravenous Continuous    and PTA meds:   Prior to Admission Medications   Prescriptions Last Dose Informant Patient Reported? Taking?    BD Insulin Syringe U/F 31G X 5/16" 0 5 ML MISC   No No   Sig: inject under the skin twice daily   FREESTYLE LITE test strip  Self No No Sig: TEST 3 TIMES A DAY   HumaLOG Mix 75/25 (75-25) 100 UNIT/ML   No No   Sig: inject 50 units in the morning and 50 units at night   Insulin Syringe-Needle U-100 (B-D INS SYRINGE 0 5CC/31GX5/16) 31G X 5/16" 0 5 ML MISC  Self Yes No   Sig: by Does not apply route   Lancets (FREESTYLE) lancets  Self Yes No   Sig: by Does not apply route      Facility-Administered Medications: None       Allergies   Allergen Reactions    Nitrofurantoin        Objective   Vitals:Blood pressure 133/78, pulse 101, temperature 97 9 °F (36 6 °C), temperature source Temporal, resp  rate 18, height 5' (1 524 m), weight 57 2 kg (126 lb), SpO2 97 %  ,Body mass index is 24 61 kg/m²  No intake or output data in the 24 hours ending 05/03/22 1159    Invasive Devices: Invasive Devices  Report    Peripheral Intravenous Line            Peripheral IV 05/02/22 Right Arm <1 day                Physical Exam  Vitals and nursing note reviewed  Constitutional:       General: She is not in acute distress  Appearance: She is not ill-appearing  Comments: Older, frail appearing female   HENT:      Head: Normocephalic  Mouth/Throat:      Mouth: Mucous membranes are moist       Pharynx: Oropharynx is clear  Eyes:      General: No scleral icterus  Right eye: No discharge  Left eye: No discharge  Extraocular Movements: Extraocular movements intact  Conjunctiva/sclera: Conjunctivae normal    Cardiovascular:      Rate and Rhythm: Normal rate  Pulmonary:      Effort: Pulmonary effort is normal  No respiratory distress  Musculoskeletal:         General: Normal range of motion  Cervical back: Normal range of motion  Skin:     General: Skin is warm and dry  Coloration: Skin is not jaundiced or pale  Comments: Generalized bruising and abrasions   Neurological:      Mental Status: She is alert  Coordination: Finger-nose-finger test: Right normal, left dysmetria        Deep Tendon Reflexes:      Reflex Scores:       Bicep reflexes are 1+ on the right side and 1+ on the left side  Brachioradialis reflexes are 1+ on the right side and 1+ on the left side  Patellar reflexes are 1+ on the right side and 1+ on the left side  Psychiatric:         Mood and Affect: Mood normal        Neurologic Exam     Mental Status   Level of consciousness: alert  Able to state she is at [address] to follow commands appropriately  Speech is intermittently slurred at times and slow but able to repeat phrases correctly  Cranial Nerves   CN exam limited due to dementia  Inconsistent visual field testing- questionable left visual field cut  PERRL  Gaze conjugate  Reports decreased sensation in left face  Questionable left facial weakness at rest, smile appears symmetric  EOM intact  Tongue midline     Motor Exam   Muscle bulk: normal  Right arm pronator drift: absent  Left arm pronator drift: Pronation without drift  Formal strength testing limited due to pain  Full strength throughout bilateral UE/LE except for:  Left hand and biceps 4/5, left proximal strength testing deferred due to left shoulder pain  Able to raise left LE up and hold antigravity with slight drift noted     Sensory Exam   Pinprick normal    Questionable left extinction      Gait, Coordination, and Reflexes     Gait  Gait: (Deferred due to safety)    Coordination   Finger-nose-finger test: Right normal, left dysmetria  Tremor   Resting tremor: absent    Reflexes   Right brachioradialis: 1+  Left brachioradialis: 1+  Right biceps: 1+  Left biceps: 1+  Right patellar: 1+  Left patellar: 1+  Right plantar: equivocal  Left plantar: upgoing      Lab Results:   I have personally reviewed pertinent reports    , CBC:   Results from last 7 days   Lab Units 05/02/22  1853   WBC Thousand/uL 10 81*   RBC Million/uL 4 52   HEMOGLOBIN g/dL 13 1   HEMATOCRIT % 38 6   MCV fL 85   PLATELETS Thousands/uL 206   , BMP/CMP:   Results from last 7 days   Lab Units 05/02/22  1853   SODIUM mmol/L 134*   POTASSIUM mmol/L 3 7   CHLORIDE mmol/L 99*   CO2 mmol/L 25   BUN mg/dL 20   CREATININE mg/dL 0 99   CALCIUM mg/dL 8 8   AST U/L 26   ALT U/L 24   ALK PHOS U/L 132*   EGFR ml/min/1 73sq m 49   , Vitamin B12:   , HgBA1C:   Results from last 7 days   Lab Units 05/03/22  0544   HEMOGLOBIN A1C % 8 2*   , TSH:   , Coagulation:   , Lipid Profile:   Results from last 7 days   Lab Units 05/03/22  0544   HDL mg/dL 94   LDL CALC mg/dL 176*   TRIGLYCERIDES mg/dL 83   , Ammonia:   , Urinalysis:   Results from last 7 days   Lab Units 05/03/22  0259   COLOR UA  Yellow   CLARITY UA  Turbid   SPEC GRAV UA  <=1 005   PH UA  6 5   LEUKOCYTES UA  Small*   NITRITE UA  Positive*   GLUCOSE UA mg/dl Negative   KETONES UA mg/dl Negative   BILIRUBIN UA  Negative   BLOOD UA  Negative   , Drug Screen:   , Medication Drug Levels:       Invalid input(s): CARBAMAZEPINE,  PHENOBARB, LACOSAMIDE, OXCARBAZEPINE     Imaging Studies: I have personally reviewed pertinent reports  EKG, Pathology, and Other Studies: I have personally reviewed pertinent reports      VTE Prophylaxis: Sequential compression device (Venodyne)  and Enoxaparin (Lovenox)    Code Status: Level 3 - DNAR and DNI  Advance Directive and Living Will:      Power of :    POLST:

## 2022-05-03 NOTE — ASSESSMENT & PLAN NOTE
Wt Readings from Last 3 Encounters:   05/02/22 57 6 kg (126 lb 15 8 oz)   11/21/20 57 6 kg (127 lb)   05/31/20 61 9 kg (136 lb 7 4 oz)     · History of congestive heart failure noted in the patient's chart  · No previous ECHO results able to be found  · Has elevated   · No LE edema appreciated on physical exam  · Is not prescribed outpatient diuretic or HTN medication  · Has weakness/stroke-like symptoms as above  · ECHO ordered and report is pending  Does not appear to be in overload    No need of intravenous diuresis  · Continue monitoring and consider IV diuresis if s/s of volume overload become apparent

## 2022-05-03 NOTE — ED PROVIDER NOTES
History  Chief Complaint   Patient presents with    Weakness - Generalized     Pts son reports inability to walk, increased sleeping, inability to move around or move left side all starting on Saturday  Hx of Alzheimers, dementia, diabetes, son looking for either short term rehab or nursing home placement     81 yo with h/o dementia  Son noticed on Saturday that she has not been able to walk without assistance  She reports dizziness as well  Worsened on Saturday  She also has right arm weakness  No facial droop  No speech symptoms  Son feels she is currently at baseline mental status  Son also notes increased urinary frequency since Friday  No fever       History provided by:  Patient   used: No    CVA/TIA-like Symptoms  Presenting symptoms: confusion, loss of balance and weakness    Date/time of last known well:  4/30/2022 11:36 PM  Onset quality:  Sudden  Duration: days  Timing:  Constant  Progression:  Waxing and waning  Similar to previous episodes: no    Associated symptoms: no chest pain, no fever, no seizures and no vomiting        Prior to Admission Medications   Prescriptions Last Dose Informant Patient Reported? Taking?    BD Insulin Syringe U/F 31G X 5/16" 0 5 ML MISC   No No   Sig: inject under the skin twice daily   FREESTYLE LITE test strip  Self No No   Sig: TEST 3 TIMES A DAY   HumaLOG Mix 75/25 (75-25) 100 UNIT/ML   No No   Sig: inject 50 units in the morning and 50 units at night   Insulin Syringe-Needle U-100 (B-D INS SYRINGE 0 5CC/31GX5/16) 31G X 5/16" 0 5 ML MISC  Self Yes No   Sig: by Does not apply route   Lancets (FREESTYLE) lancets  Self Yes No   Sig: by Does not apply route      Facility-Administered Medications: None       Past Medical History:   Diagnosis Date    Aortic valve disorder     CHF (congestive heart failure) (HCC)     Syncope        Past Surgical History:   Procedure Laterality Date    BLADDER SURGERY      CATARACT EXTRACTION      MASTECTOMY Right last assessed 7/29/14    TOTAL KNEE ARTHROPLASTY         History reviewed  No pertinent family history  I have reviewed and agree with the history as documented  E-Cigarette/Vaping    E-Cigarette Use Never User      E-Cigarette/Vaping Substances    Nicotine No     THC No     CBD No     Flavoring No     Other No     Unknown No      Social History     Tobacco Use    Smoking status: Never Smoker    Smokeless tobacco: Never Used    Tobacco comment: tobacco non user   Vaping Use    Vaping Use: Never used   Substance Use Topics    Alcohol use: No    Drug use: No       Review of Systems   Constitutional: Negative for chills and fever  HENT: Negative for ear pain and sore throat  Eyes: Negative for pain and visual disturbance  Respiratory: Negative for cough and shortness of breath  Cardiovascular: Negative for chest pain and palpitations  Gastrointestinal: Negative for abdominal pain and vomiting  Genitourinary: Negative for dysuria and hematuria  Musculoskeletal: Positive for gait problem  Negative for arthralgias and back pain  Skin: Negative for color change and rash  Neurological: Positive for weakness and loss of balance  Negative for seizures and syncope  Psychiatric/Behavioral: Positive for confusion  All other systems reviewed and are negative  Physical Exam  Physical Exam  Vitals and nursing note reviewed  Constitutional:       General: She is not in acute distress  Appearance: She is well-developed  HENT:      Head: Normocephalic and atraumatic  Eyes:      Conjunctiva/sclera: Conjunctivae normal    Cardiovascular:      Rate and Rhythm: Normal rate and regular rhythm  Heart sounds: No murmur heard  Pulmonary:      Effort: Pulmonary effort is normal  No respiratory distress  Breath sounds: Normal breath sounds  Abdominal:      Palpations: Abdomen is soft  Tenderness: There is no abdominal tenderness     Musculoskeletal:      Cervical back: Neck supple  Skin:     General: Skin is warm and dry  Neurological:      Mental Status: She is alert and oriented to person, place, and time  GCS: GCS eye subscore is 4  GCS verbal subscore is 5  GCS motor subscore is 6  Cranial Nerves: Cranial nerves are intact  Sensory: Sensation is intact  Comments: GCS 15  AAOx3  No focal neuro deficits  CN II-XII grossly intact  Speech normal, no aphasia or dysarthria  Right arm drift  Left arm normal  Cerebellar function normal  Finger to nose normal  PERRL  EOMI  Peripheral vision intact  No nystagmus  Upper and lower extremity strength 5/5 through   strength 5/5 b/l  Gross sensation intact b/l              Vital Signs  ED Triage Vitals [05/02/22 1844]   Temperature Pulse Respirations Blood Pressure SpO2   97 9 °F (36 6 °C) 99 18 139/69 98 %      Temp Source Heart Rate Source Patient Position - Orthostatic VS BP Location FiO2 (%)   Temporal Monitor Sitting Right arm --      Pain Score       --           Vitals:    05/02/22 1844   BP: 139/69   Pulse: 99   Patient Position - Orthostatic VS: Sitting         Visual Acuity      ED Medications  Medications   iohexol (OMNIPAQUE) 350 MG/ML injection (SINGLE-DOSE) 100 mL (has no administration in time range)   OLANZapine (ZyPREXA) IM injection 5 mg (has no administration in time range)   iohexol (OMNIPAQUE) 350 MG/ML injection (SINGLE-DOSE) 85 mL (85 mL Intravenous Given 5/2/22 2121)       Diagnostic Studies  Results Reviewed     Procedure Component Value Units Date/Time    UA w Reflex to Microscopic w Reflex to Culture [[id number]]     Lab Status: No result Specimen: Urine     HS Troponin I 4hr [[id number]]     Lab Status: No result Specimen: Blood     NT-BNP PRO [[id number]]  (Abnormal) Collected: 05/02/22 1853    Lab Status: Final result Specimen: Blood from Arm, Right Updated: 05/02/22 1936     NT-proBNP 982 pg/mL     HS Troponin I 2hr [[id number]]     Lab Status: No result Specimen: Blood     HS Troponin 0hr (reflex protocol) [[id number]]  (Normal) Collected: 05/02/22 1853    Lab Status: Final result Specimen: Blood from Arm, Right Updated: 05/02/22 1936     hs TnI 0hr 23 ng/L     Comprehensive metabolic panel [[id number]]  (Abnormal) Collected: 05/02/22 1853    Lab Status: Final result Specimen: Blood from Arm, Right Updated: 05/02/22 1929     Sodium 134 mmol/L      Potassium 3 7 mmol/L      Chloride 99 mmol/L      CO2 25 mmol/L      ANION GAP 10 mmol/L      BUN 20 mg/dL      Creatinine 0 99 mg/dL      Glucose 152 mg/dL      Calcium 8 8 mg/dL      Corrected Calcium 9 4 mg/dL      AST 26 U/L      ALT 24 U/L      Alkaline Phosphatase 132 U/L      Total Protein 6 8 g/dL      Albumin 3 2 g/dL      Total Bilirubin 0 46 mg/dL      eGFR 49 ml/min/1 73sq m     Narrative:      [place] guidelines for Chronic Kidney Disease (CKD):     Stage 1 with normal or high GFR (GFR > 90 mL/min/1 73 square meters)    Stage 2 Mild CKD (GFR = 60-89 mL/min/1 73 square meters)    Stage 3A Moderate CKD (GFR = 45-59 mL/min/1 73 square meters)    Stage 3B Moderate CKD (GFR = 30-44 mL/min/1 73 square meters)    Stage 4 Severe CKD (GFR = 15-29 mL/min/1 73 square meters)    Stage 5 End Stage CKD (GFR <15 mL/min/1 73 square meters)  Note: GFR calculation is accurate only with a steady state creatinine    CBC and differential [[id number]]  (Abnormal) Collected: 05/02/22 1853    Lab Status: Final result Specimen: Blood from Arm, Right Updated: 05/02/22 1900     WBC 10 81 Thousand/uL      RBC 4 52 Million/uL      Hemoglobin 13 1 g/dL      Hematocrit 38 6 %      MCV 85 fL      MCH 29 0 pg      MCHC 33 9 g/dL      RDW 13 6 %      MPV 11 4 fL      Platelets 972 Thousands/uL      nRBC 0 /100 WBCs      Neutrophils Relative 78 %      Immat GRANS % 0 %      Lymphocytes Relative 13 %      Monocytes Relative 7 %      Eosinophils Relative 2 %      Basophils Relative 0 %      Neutrophils Absolute 8 44 Thousands/µL Immature Grans Absolute 0 03 Thousand/uL      Lymphocytes Absolute 1 37 Thousands/µL      Monocytes Absolute 0 75 Thousand/µL      Eosinophils Absolute 0 18 Thousand/µL      Basophils Absolute 0 04 Thousands/µL                  CTA head with and without contrast   Final Result by Radha Rico MD (05/02 2249)         1  No evidence of acute vascular territorial infarction, intracranial hemorrhage or mass effect  2   Occlusion versus severe stenosis of the right posterior cerebral artery distal to the P1 segment, of uncertain chronicity  3   Multifocal moderate to severe stenoses in the carotid siphons  Workstation performed: [id number]         XR chest 1 view portable    (Results Pending)              Procedures  ECG 12 Lead Documentation Only    Date/Time: 5/2/2022 11:38 PM  Performed by: Danyel Pantoja PA-C  Authorized by: Danyel Pantoja PA-C     ECG reviewed by me, the ED Provider: yes    Patient location:  ED  Interpretation:     Interpretation: normal    Quality:     Tracing quality:  Limited by artifact  Rate:     ECG rate:  93    ECG rate assessment: normal    Rhythm:     Rhythm: sinus rhythm    Ectopy:     Ectopy: none    QRS:     QRS axis:  Normal    QRS intervals:  Normal  Conduction:     Conduction: abnormal      Abnormal conduction: complete RBBB    ST segments:     ST segments:  Normal  T waves:     T waves: normal               ED Course                                             MDM  Number of Diagnoses or Management Options  Ambulatory dysfunction: new and requires workup  Right sided weakness: new and requires workup  Diagnosis management comments: DDX including but not limited to: TIA, CVA, metabolic abnormality, cardiac etiology, atypical migraine, seizure, tumor, thyroid disease  Plan: CTA head/neck  Cardiac workup  UA  dispo pending          Amount and/or Complexity of Data Reviewed  Clinical lab tests: ordered and reviewed  Tests in the radiology section of CPT®: ordered and reviewed    Risk of Complications, Morbidity, and/or Mortality  Presenting problems: moderate  Management options: low  General comments: 81 yo with arm weakness and gait dysfunction  No acute infarction seen on CT  Does have complete occlusion vs severe stenosis of posterior cerebral artery  Will admit given the gait dysfunction and arm weakness  Son agrees with plan  Stable at time of admission  Patient Progress  Patient progress: stable      Disposition  Final diagnoses:   Right sided weakness   Ambulatory dysfunction     Time reflects when diagnosis was documented in both MDM as applicable and the Disposition within this note     Time User Action Codes Description Comment    5/2/2022 10:57 PM Meenu Biswas [R53 1] Right sided weakness     5/2/2022 10:57 PM Meenu Biswas [R26 2] Ambulatory dysfunction       ED Disposition     ED Disposition Condition Date/Time Comment    Admit Stable Mon May 2, 2022 10:58 PM Case was discussed with Libby Kenny and the patient's admission status was agreed to be Admission Status: inpatient status to the service of Dr Santosh Conrad  Follow-up Information    None         Patient's Medications   Discharge Prescriptions    No medications on file       No discharge procedures on file      PDMP Review     None          ED Provider  Electronically Signed by           Devang Alas PA-C  05/02/22 2666

## 2022-05-03 NOTE — SPEECH THERAPY NOTE
Speech-Language Pathology Bedside Swallow Evaluation        Patient Name: Nidhi Benitez  Today's Date: 5/3/2022     Problem List  Principal Problem:    Stroke-like symptoms  Active Problems:    Type 2 diabetes mellitus without complication, with long-term current use of insulin (HCC)    CHF (congestive heart failure) ([facility] [address] )    Late onset Alzheimer's disease without behavioral disturbance ([facility])    Urinary frequency    Hyponatremia       Past Medical History  Past Medical History:   Diagnosis Date    Aortic valve disorder     CHF (congestive heart failure) ([facility] [address] )     Syncope        Past Surgical History  Past Surgical History:   Procedure Laterality Date    BLADDER SURGERY      CATARACT EXTRACTION      MASTECTOMY Right     last assessed 7/29/14    TOTAL KNEE ARTHROPLASTY         Summary/Impressions:    Pt presents with mild oral dysphagia confounded by current mental status/confusion  Otherwise swallow appears GWFL  Received awake, oriented to self  Able to self-feed PO trials with min-moderate assistance  Prolonged mastication and bolus transfer  Mild oral residue which was cleared with liquid wash  No s/s suggestive of aspiration or pharyngeal dysphagia  Vocal quality remains clear throughout  Recommendations:   Diet: regular diet and thin liquids   Meds: whole with liquid   Feeding assistance: 1:1 supervision and assist as needed  Frequent Oral care: 2-4x/day  Aspiration precautions and compensatory swallowing strategies: upright posture, only feed when fully alert, slow rate of feeding, small bites/sips and alternating bites and sips  Other Recommendations/ considerations: Liquid wash  / alternate solids and liquids     Current Medical Status  Pt is a 80 y o  female who presented to [address] with h/o dementia and increased dizziness  Stroke-like symptoms  Given the above in addition to mental status, dysphagia evaluation orders placed         Past medical history:  Please see H&P for details    Special Studies:  MRI pending  CXR Pending  CTA head w/o contrast 5/2/22:  1  No evidence of acute vascular territorial infarction, intracranial hemorrhage or mass effect  2   Occlusion versus severe stenosis of the right posterior cerebral artery distal to the P1 segment, of uncertain chronicity  3   Multifocal moderate to severe stenoses in the carotid siphons      Social/Education/Vocational Hx:  Pt lives with family    Swallow Information   Current Risks for Dysphagia & Aspiration: confusion, stroke pathway  Current Symptoms/Concerns: AMS  Current Diet: regular diet and thin liquids   Baseline Diet: regular diet and thin liquids - presumed     Baseline Assessment   Behavior/Cognition: decreased attention  Speech/Language Status: able to follow commands inconsistently  Patient Positioning: upright in bed    Swallow Mechanism Exam   Facial: symmetrical  Labial: WFL  Lingual: WFL  Velum: unable to visualize  Mandible: adequate ROM  Dentition: adequate  Vocal quality:clear/adequate   Volitional Cough: unable to initiate volitional cough   Respiratory: RA    Consistencies Assessed and Performance   Consistencies Administered: ice chips, thin liquids, puree and hard solids    Oral Stage: Mild oral dysphagia characterized by prolonged mastication and transfer  Trace-mild oral residue cleared with cued liquid wash  Otherwise adequate bolus containment, manipulation and functional control noted across all textures  Pharyngeal Stage: Laryngeal rise noted upon digital manipulation  Swallow initiation seems timely  No s/s suggestive of aspiration or pharyngeal dysphagia during this encounter  Vocal quality remains clear         Esophageal Concerns: none reported      Results Reviewed with: patient, RN and MD     Plan  Will continue to follow for 1-3x  Dysphagia Goals: pt will tolerate regular solids with thin liquids without s/s of aspiration x1-3  Discharge recommendation: likely no follow up needed for swallowing    Speech Therapy Prognosis   Prognosis: good  Prognosis Considerations: medical status        Yoandy Prado [address], [address] [facility]  Speech-Language Pathologist  [place] #[id number]  [place] #[id number]

## 2022-05-03 NOTE — TELEPHONE ENCOUNTER
Beba Leggett called from radiology  Dr in the hospital ordered an MRI  Santosh Knutson has a device; [employer] lube recorder  Do you know anything about her medical history? Is the lube recorder the original or was it changed?     Pt has alzheimer's     Her cardiologist hasn't seen her since 2017

## 2022-05-03 NOTE — PROGRESS NOTES
[address]  Progress Note - Aleena Wu 8/30/1930, 80 y o  female MRN: [id number]  Unit/Bed#: -[facility] Encounter: [id number]  Primary Care Provider: Karly Bronson MD   Date and time admitted to hospital: 5/2/2022  8:03 PM    * Stroke-like symptoms  Assessment & Plan  Son reports increased drowsiness, dizziness, inability to walk, and left sided weakness ("she won't move her left foot" and "her left hand is weak") starting Saturday  Due to underlying dementia and agitation ROS or history is unable to be obtained from patient  /69 (BP Location: Right arm)   Pulse 99   Temp 97 9 °F (36 6 °C) (Temporal)   Resp 18   Ht 5' (1 524 m)   Wt 57 6 kg (126 lb 15 8 oz)   SpO2 98%   BMI 24 80 kg/m²     · Son reports increased drowsiness, dizziness, inability to walk, and left sided weakness ("she won't move her left foot" and "her left hand is weak") starting Saturday  · Son reports patient was at her normal ambulatory baseline Friday (walker), then noticed complete inability to ambulate Saturday  · ED note mentions right arm weakness, however patient not currently able to cooperate with full physical exam  · Not hypoglycemic; has mild hyponatremia as below without other major electrolyte disturbance     · CTA without evidence for acute infarct, bleed, or mass effect, however is showing 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 and their input is as follows    Per Dr Zeyad Figueroa    Initial CT scan of the head was unremarkable, CTA of the head and neck showed severe stenosis/occlusion of the right PCA, with 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, her labs revealed significant hyperlipidemia, and on examination patient had subtle left-sided weakness with neglect and field cut to suspect a right PCA infarct  Patient admitted on stroke pathway, MRI of the brain done subsequently confirms the presence of a right PCA infarct of recent duration, patient started on dual anti-platelet therapy,/statin therapy will recommend adequate blood pressure and, blood sugar control and patient may need inpatient rehab  At baseline she also suffers from dementia late onset Alzheimer's type and hence recommend delirium precautions         Hyponatremia  Assessment & Plan  · Sodium 134  · Will give continuous gentle IV NS to support permissive HTN overnight  · Periodically monitoring of BMP    Urinary frequency  Assessment & Plan  · ED note reports increasing urinary frequency since Friday without fever  · Son feels this increased frequency was actually agitation with patient repeatedly trying to get up and walk around at home  · Due to dementia as above patient is unable to provide detailed history  · UA currently pending, if positive consider antibiotics        Late onset Alzheimer's disease without behavioral disturbance ([facility] [address] )  Assessment & Plan  · Patient currently confused, not able to answer questions, is agitated, and actively trying to get out of bed  · Son reports patient is at mental baseline currently although more agitated than usal  · Will give one-time dose IM zyprexa and utilize soft restraint belt to avoid fall at this time  · Son is looking for short term rehab or nursing home placement due to difficulty taking care of the patient at home  · Delirium precautions  · Case management consulted, as patient will likely need placement    CHF (congestive heart failure) ([facility] [address] )  Assessment & Plan  Wt Readings from Last 3 Encounters:   05/02/22 57 6 kg (126 lb 15 8 oz)   11/21/20 57 6 kg (127 lb)   05/31/20 61 9 kg (136 lb 7 4 oz)     · History of congestive heart failure noted in the patient's chart  · No previous ECHO results able to be found  · Has elevated   · No LE edema appreciated on physical exam  · Is not prescribed outpatient diuretic or HTN medication  · Has weakness/stroke-like symptoms as above  · ECHO ordered and report is pending  Does not appear to be in overload  No need of intravenous diuresis  · Continue monitoring and consider IV diuresis if s/s of volume overload become apparent       Type 2 diabetes mellitus without complication, with long-term current use of insulin (HCC)  Assessment & Plan  Lab Results   Component Value Date    HGBA1C 7 8 (H) 04/10/2020     · Blood glucose 152  · Will order home humalog 75/25 as hospitals 70/30 formulation; will order 30U qAM and 30U qPM to avoid hypoglycemia overnight in the setting of stroke protocol (increase as appropriate)  · Correctional SSI  · Hypoglycemia protocol; diabetic diet ordered         TE Pharmacologic Prophylaxis: Enoxaparin (Lovenox)    Patient Centered Rounds: I have performed bedside rounds with nursing staff today  Discussions with Specialists or Other Care Team Provider:  Neurology  Education and Discussions with Family / Patient:  Patient    Current Length of Stay: 1 day(s)  Current Patient Status: Inpatient     Certification Statement: The patient will continue to require additional inpatient hospital stay due to Stroke-like symptoms  Discharge Plan / Estimated Discharge Date:  1-2 more days    Code Status: Level 3 - DNAR and DNI  ______________________________________________________________________________    Subjective:   Patient seen and examined by me  No chest pain, palpitations or diaphoresis  Patient does have confusion  Did go to the MRI but got agitated and needed Ativan prior to finishing the test   Patient not cooperative for a good exam and is confused  No fever or chills    No cough or diarrhea    Objective:   Vitals: Blood pressure 110/68, pulse 88, temperature 97 5 °F (36 4 °C), temperature source Axillary, resp  rate 18, height 5' (1 524 m), weight 57 2 kg (126 lb), SpO2 98 %  Physical Exam:   General appearance: alert, appears stated age and cooperative  Constitutional- looks a little weak  HEENT - atraumatic and normocephalic  Neck- supple  Skin - no fresh rash  Extremities no fresh focal deformities  Cardiovascular- S1-S2 heard  Respiratory- bilateral air entry present, no crackles or rhonchi  Skin - no fresh rash  Abdomen - normal bowel sounds present, no rebound tenderness  CNS-  patient seems confused and is not cooperative for good neurological examination  Psych- no acute psychosis but patient confused and is on medical one-to-one    Additional Data:   Labs:  Results from last 7 days   Lab Units 05/02/22  1853   WBC Thousand/uL 10 81*   HEMOGLOBIN g/dL 13 1   HEMATOCRIT % 38 6   MCV fL 85   PLATELETS Thousands/uL 206     Results from last 7 days   Lab Units 05/02/22  1853   SODIUM mmol/L 134*   POTASSIUM mmol/L 3 7   CHLORIDE mmol/L 99*   CO2 mmol/L 25   ANION GAP mmol/L 10   BUN mg/dL 20   CREATININE mg/dL 0 99   CALCIUM mg/dL 8 8   ALBUMIN g/dL 3 2*   TOTAL BILIRUBIN mg/dL 0 46   ALK PHOS U/L 132*   ALT U/L 24   AST U/L 26   EGFR ml/min/1 73sq m 49   GLUCOSE RANDOM mg/dL 152*             Results from last 7 days   Lab Units 05/02/22  1853   NT-PRO BNP pg/mL 982*          Results from last 7 days   Lab Units 05/03/22  1553 05/03/22  0741 05/03/22  0116   POC GLUCOSE mg/dl 41* 227* 154*     Results from last 7 days   Lab Units 05/03/22  0544   HEMOGLOBIN A1C % 8 2*         * I Have Reviewed All Lab Data Listed Above  Cultures:               Imaging:  Imaging Reports Reviewed Today Include:   CTA head with and without contrast    Result Date: 5/2/2022  Impression: 1  No evidence of acute vascular territorial infarction, intracranial hemorrhage or mass effect  2   Occlusion versus severe stenosis of the right posterior cerebral artery distal to the P1 segment, of uncertain chronicity   3  Multifocal moderate to severe stenoses in the carotid siphons  Workstation performed: [id number]     XR chest 1 view portable    Result Date: 5/3/2022  Impression: No acute cardiopulmonary disease  Workstation performed: [id number]     MRI brain wo contrast    Result Date: 5/3/2022  Impression: Recent infarcts are identified in the right posterior cerebral artery which diffusion to include the medial temporal lobe/hippocampus, occipital lobe, and right centrum semiovale  Chronic lacunar infarcts bilateral cerebellum  Workstation performed: [id number]     XR follow up    Result Date: 5/3/2022  Impression: No radiopaque foreign bodies identified within the abdomen, pelvis or proximal thighs  Workstation performed: [id number]     Scheduled Meds:  Current Facility-Administered Medications   Medication Dose Route Frequency Provider Last Rate    [START ON 5/4/2022] aspirin  81 mg Oral Daily [place], [place]      atorvastatin  40 mg Oral QPM [place], [place]      clopidogrel  75 mg Oral Daily BRIAN Rosen      enoxaparin  40 mg Subcutaneous Daily [facility] [place]      insulin aspart protamine-insulin aspart  30 Units Subcutaneous QAM [place], [place]      [START ON 5/4/2022] insulin aspart protamine-insulin aspart  30 Units Subcutaneous QPM [facility], PA-C      insulin lispro  1-5 Units Subcutaneous TID AC [place], [place]      insulin lispro  1-5 Units Subcutaneous HS [place], [place]      sodium chloride  50 mL/hr Intravenous Continuous [facility], PA-C 50 mL/hr (05/03/22 1415)       Noel Bonner MD  [facility] Internal Medicine    ** Please Note: This note has been constructed using a voice recognition system   **

## 2022-05-03 NOTE — ASSESSMENT & PLAN NOTE
· Patient currently confused, not able to answer questions, is agitated, and actively trying to get out of bed  · Son reports patient is at mental baseline currently although more agitated than usal  · Will give one-time dose IM zyprexa and utilize soft restraint belt to avoid fall at this time  · Son is looking for short term rehab or nursing home placement due to difficulty taking care of the patient at home  · Delirium precautions  · Case management consulted, as patient will likely need placement

## 2022-05-03 NOTE — ASSESSMENT & PLAN NOTE
· ED note reports increasing urinary frequency since Friday without fever  · Son feels this increased frequency was actually agitation with patient repeatedly trying to get up and walk around at home  · Due to dementia as above patient is unable to provide detailed history  · UA currently pending, if positive consider antibiotics

## 2022-05-03 NOTE — ASSESSMENT & PLAN NOTE
Wt Readings from Last 3 Encounters:   05/02/22 57 6 kg (126 lb 15 8 oz)   11/21/20 57 6 kg (127 lb)   05/31/20 61 9 kg (136 lb 7 4 oz)     · History of congestive heart failure noted in the patient's chart  · No previous ECHO results able to be found  · Has elevated   · Minimal to no LE edema able to be appreciated, however patient agitated during exam making detailed examination difficult   · Is not prescribed outpatient diuretic or HTN medication  · Has weakness/stroke-like symptoms as above  · ECHO ordered as above   · Continue monitoring and consider IV diuresis if s/s of volume overload become apparent

## 2022-05-03 NOTE — ASSESSMENT & PLAN NOTE
Son reports increased drowsiness, dizziness, inability to walk, and left sided weakness ("she won't move her left foot" and "her left hand is weak") starting Saturday  Due to underlying dementia and agitation ROS or history is unable to be obtained from patient  /69 (BP Location: Right arm)   Pulse 99   Temp 97 9 °F (36 6 °C) (Temporal)   Resp 18   Ht 5' (1 524 m)   Wt 57 6 kg (126 lb 15 8 oz)   SpO2 98%   BMI 24 80 kg/m²     · Son reports increased drowsiness, dizziness, inability to walk, and left sided weakness ("she won't move her left foot" and "her left hand is weak") starting Saturday  · Son reports patient was at her normal ambulatory baseline Friday (walker), then noticed complete inability to ambulate Saturday  · ED note mentions right arm weakness, however patient not currently able to cooperate with full physical exam  · Not hypoglycemic; has mild hyponatremia as below without other major electrolyte disturbance  · CTA without evidence for acute infarct, bleed, or mass effect, however is showing 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 and their input is as follows    Per Dr Donte Cardona    Initial CT scan of the head was unremarkable, CTA of the head and neck showed severe stenosis/occlusion of the right PCA, with 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

## 2022-05-03 NOTE — PHYSICAL THERAPY NOTE
Physical Therapy Evaluation     Patient's Name: Mandi Sports    Admitting Diagnosis  Weakness [R53 1]    Problem List  Patient Active Problem List   Diagnosis    Type 2 diabetes mellitus without complication, with long-term current use of insulin ([facility]    CHF (congestive heart failure) ([facility]    Aortic valve disorder    Need for immunization against influenza    Syncope    Pressure injury of left buttock, stage 1    Late onset Alzheimer's disease without behavioral disturbance ([facility] )    Pressure injury of left heel, unstageable ([address] )    Stroke-like symptoms    Urinary frequency    Hyponatremia     Past Medical History  Past Medical History:   Diagnosis Date    Aortic valve disorder     CHF (congestive heart failure) ([address] )     Syncope      Past Surgical History  Past Surgical History:   Procedure Laterality Date    BLADDER SURGERY      CATARACT EXTRACTION      MASTECTOMY Right     last assessed 7/29/14    TOTAL KNEE ARTHROPLASTY        05/03/22 0801   PT Last Visit   PT Visit Date 05/03/22   Note Type   Note type Evaluation   Pain Assessment   Pain Assessment Tool FLACC   Pain Rating: FLACC (Rest) - Face 0   Pain Rating: FLACC (Rest) - Legs 0   Pain Rating: FLACC (Rest) - Activity 0   Pain Rating: FLACC (Rest) - Cry 0   Pain Rating: FLACC (Rest) - Consolability 0   Score: FLACC (Rest) 0   Pain Rating: FLACC (Activity) - Face 0   Pain Rating: FLACC (Activity) - Legs 0   Pain Rating: FLACC (Activity) - Activity 0   Pain Rating: FLACC (Activity) - Cry 0   Pain Rating: FLACC (Activity) - Consolability 0   Score: FLACC (Activity) 0   Restrictions/Precautions   Weight Bearing Precautions Per Order No   Braces or Orthoses Other (Comment)  (none per patient)   Other Precautions 1:1;Cognitive; Restraints;Multiple lines;Telemetry; Fall Risk   Home Living   Type of [address] [place] [address] Two level;Bed/bath upstairs;1/2 bath on main level   Bathroom Shower/Tub Walk-in shower   Bathroom Toilet Standard Bathroom Equipment Other (Comment)  (none per patient)   [facility]   Additional Comments Pt ambulates with a walker  Prior Function   Level of [place] Independent with ADLs and functional mobility; Needs assistance with IADLs   Lives With Son;Spouse  (per chart review)   Receives Help From Family   ADL Assistance Independent   IADLs Needs assistance   Falls in the last 6 months 1 to 4  ("maybe 2" per patient)   Vocational Retired   Comments Pt is a poor historian; information obtained from patient and chart review; further clarification is required   General   Family/Caregiver Present No   Cognition   Overall Cognitive Status Impaired   Arousal/Participation Alert   Attention Attends with cues to redirect   Orientation Level Oriented to person;Oriented to place;Oriented to situation;Disoriented to time   Memory Decreased short term memory   Following Commands Follows one step commands with increased time or repetition   Comments Pt agreeable to PT  Subjective   Subjective "I can't walk "   RUE Assessment   RUE Assessment   (defer to OT assessment)   LUE Assessment   LUE Assessment   (defer to OT assessment)   RLE Assessment   RLE Assessment X   Strength RLE   RLE Overall Strength 4-/5   LLE Assessment   LLE Assessment X   Strength LLE   L Hip Flexion 3+/5   L Knee Extension 3+/5   L Ankle Dorsiflexion 3+/5   Light Touch   RLE Light Touch Grossly intact   LLE Light Touch Grossly intact   Bed Mobility   Supine to Sit 4  Minimal assistance   Additional items Assist x 1;HOB elevated; Increased time required;Verbal cues;LE management   Sit to Supine 3  Moderate assistance   Additional items Assist x 2; Increased time required;Verbal cues;LE management   Transfers   Sit to Stand 4  Minimal assistance   Additional items Assist x 2; Increased time required;Verbal cues   Stand to Sit 4  Minimal assistance   Additional items Assist x 2; Increased time required;Verbal cues Ambulation/Elevation   Gait pattern Decreased toe off;Decreased heel strike;Decreased hip extension; Excessively slow; Step to;Short stride;Scissoring;Decreased L stance;Decreased foot clearance; Improper Weight shift   Gait Assistance 3  Moderate assist   Additional items Assist x 2;Verbal cues   Assistive Device Rolling walker   Distance 5 feet x 2 trials  (seated rest break between trials)   Stair Management Assistance Not tested   Balance   Static Sitting Fair   Dynamic Sitting Fair -   Static Standing Poor   Dynamic Standing Poor -   Ambulatory Poor -   Endurance Deficit   Endurance Deficit Yes   Endurance Deficit Description decreased activity tolerance   Activity Tolerance   Activity Tolerance Patient limited by fatigue   Medical Staff Made Aware OT Anya Antoine 6 Discussed case with RN   Assessment   Prognosis Good   Problem List Decreased strength;Decreased endurance; Impaired balance;Decreased mobility; Decreased cognition   Assessment Pt is 80year old female seen for PT evaluation s/p admit to [facility] on 5/2/2022 with Stroke-like symptoms  PT consulted to assess pt's functional mobility and d/c needs  Order placed for PT eval and tx  Comorbidities affecting pt's physical performance at time of assessment include type 2 DM, CHF, late onset Alzheimer's disease without behavioral disturbance, urinary frequency, and hyponatremia  PTA, pt was independent with all functional mobility with a walker  Pt ambulates household distances  Pt resides with her son in a house  Personal factors affecting pt at time of IE include lives in a two story house, inability to ambulate household distances, inability to navigate level surfaces without external assistance, decreased cognition, limited home support, positive fall history, inability to perform IADLs, inability to perform ADLs, and inability to live alone   Please find objective findings from PT assessment regarding body systems outlined above with impairments and limitations including weakness, impaired balance, decreased endurance, gait deviations, decreased activity tolerance, decreased functional mobility tolerance, fall risk, and decreased cognition  The following objective measures performed on IE also reveal limitations: Barthel Index: 25/100, Modified Placer: 4 (moderate/severe disability) and AM-PAC 6-Clicks: 09/99  Pt's clinical presentation is currently unstable/unpredictable seen in pt's presentation of need for ongoing medical management/monitoring, pt is a fall risk, and pt requires cues and assist of two for safety with functional mobility  Pt to benefit from continued PT tx to address deficits as defined above and maximize level of functional independent mobility and consistency  From PT/mobility standpoint, recommendation at time of d/c would be STR pending progress in order to facilitate return to PLOF  Barriers to Discharge Inaccessible home environment;Decreased caregiver support   Goals   STG Expiration Date 05/13/22   Short Term Goal #1 In 7-10 days: Increase bilateral LE strength 1/2 grade to facilitate independent mobility, Perform all bed mobility tasks with CG assist to decrease caregiver burden, Perform all transfers with min A of 1 to improve independence, Ambulate > 25 ft  with RW with mod A of 1 w/o LOB and w/ normalized gait pattern 100% of the time and Increase all balance 1/2 grade to decrease risk for falls   Plan   Treatment/Interventions Functional transfer training;LE strengthening/ROM; Therapeutic exercise; Endurance training;Cognitive reorientation;Patient/family training;Bed mobility;Gait training;Spoke to nursing;OT   PT Frequency 3-5x/wk   Recommendation   PT Discharge Recommendation Post acute rehabilitation services   AM-[facility] Basic Mobility Inpatient   Turning in Bed Without Bedrails 3   Lying on Back to Sitting on Edge of Flat Bed 3   Moving Bed to Chair 1   Standing Up From Chair 2   Walk in Room 1   Climb 3-5 Stairs 1   Basic Mobility Inpatient Raw Score 11   Basic Mobility Standardized Score 30 25   Highest Level Of Mobility   [facility] Goal 4: Move to chair/commode   [facility] Highest Level of Mobility 5: Stand (1 or more minutes)   [facility] Goal Achieved Yes   Modified [place] Scale   Modified [place] Scale 4   Barthel Index   Feeding 5   Bathing 0   Grooming Score 0   Dressing Score 5   Bladder Score 0   Bowels Score 5   Toilet Use Score 5   Transfers (Bed/Chair) Score 5   Mobility (Level Surface) Score 0   Stairs Score 0   Barthel Index Score 25     PT Evaluation Time: 5456-5550    Mehdi Galindo, PT, DPT

## 2022-05-03 NOTE — ASSESSMENT & PLAN NOTE
Lab Results   Component Value Date    HGBA1C 8 2 (H) 05/03/2022       Recent Labs     05/03/22  0116 05/03/22  0741   POCGLU 154* 227*       Blood Sugar Average: Last 72 hrs:  (P) 190 5     - Medical management per primary team

## 2022-05-03 NOTE — ASSESSMENT & PLAN NOTE
Lab Results   Component Value Date    HGBA1C 7 8 (H) 04/10/2020     · Blood glucose 152  · Will order home humalog 75/25 as hospitals 70/30 formulation; will order 30U qAM and 30U qPM to avoid hypoglycemia overnight in the setting of stroke protocol (increase as appropriate)  · Correctional SSI  · Hypoglycemia protocol; diabetic diet ordered

## 2022-05-03 NOTE — PLAN OF CARE
Recommendations:   Diet: regular diet and thin liquids   Meds: whole with liquid   Feeding assistance: 1:1 supervision and assist as needed  Frequent Oral care: 2-4x/day  Aspiration precautions and compensatory swallowing strategies: upright posture, only feed when fully alert, slow rate of feeding, small bites/sips and alternating bites and sips  Other Recommendations/ considerations: Liquid wash  / alternate solids and liquids

## 2022-05-03 NOTE — PLAN OF CARE
Problem: PHYSICAL THERAPY ADULT  Goal: Performs mobility at highest level of function for planned discharge setting  See evaluation for individualized goals  Description: Treatment/Interventions: Functional transfer training,LE strengthening/ROM,Therapeutic exercise,Endurance training,Cognitive reorientation,Patient/family training,Bed mobility,Gait training,Spoke to nursing,OT          See flowsheet documentation for full assessment, interventions and recommendations  Note: Prognosis: Good  Problem List: Decreased strength,Decreased endurance,Impaired balance,Decreased mobility,Decreased cognition  Assessment: Pt is 80year old female seen for PT evaluation s/p admit to [facility] on 5/2/2022 with Stroke-like symptoms  PT consulted to assess pt's functional mobility and d/c needs  Order placed for PT eval and tx  Comorbidities affecting pt's physical performance at time of assessment include type 2 DM, CHF, late onset Alzheimer's disease without behavioral disturbance, urinary frequency, and hyponatremia  PTA, pt was independent with all functional mobility with a walker  Pt ambulates household distances  Pt resides with her son in a house  Personal factors affecting pt at time of IE include lives in a two story house, inability to ambulate household distances, inability to navigate level surfaces without external assistance, decreased cognition, limited home support, positive fall history, inability to perform IADLs, inability to perform ADLs, and inability to live alone  Please find objective findings from PT assessment regarding body systems outlined above with impairments and limitations including weakness, impaired balance, decreased endurance, gait deviations, decreased activity tolerance, decreased functional mobility tolerance, fall risk, and decreased cognition   The following objective measures performed on IE also reveal limitations: Barthel Index: 25/100, Modified Adriana: 4 (moderate/severe disability) and AM-PAC 6-Clicks: 58/89  Pt's clinical presentation is currently unstable/unpredictable seen in pt's presentation of need for ongoing medical management/monitoring, pt is a fall risk, and pt requires cues and assist of two for safety with functional mobility  Pt to benefit from continued PT tx to address deficits as defined above and maximize level of functional independent mobility and consistency  From PT/mobility standpoint, recommendation at time of d/c would be STR pending progress in order to facilitate return to PLOF  Barriers to Discharge: Inaccessible home environment,Decreased caregiver support     PT Discharge Recommendation: Post acute rehabilitation services     See flowsheet documentation for full assessment

## 2022-05-03 NOTE — ASSESSMENT & PLAN NOTE
· Sodium 134  · Will give continuous gentle IV NS to support permissive HTN overnight  · Periodically monitoring of BMP

## 2022-05-03 NOTE — PLAN OF CARE
Problem: MOBILITY - ADULT  Goal: Maintain or return to baseline ADL function  Description: INTERVENTIONS:  -  Assess patient's ability to carry out ADLs; assess patient's baseline for ADL function and identify physical deficits which impact ability to perform ADLs (bathing, care of mouth/teeth, toileting, grooming, dressing, etc )  - Assess/evaluate cause of self-care deficits   - Assess range of motion  - Assess patient's mobility; develop plan if impaired  - Assess patient's need for assistive devices and provide as appropriate  - Encourage maximum independence but intervene and supervise when necessary  - Involve family in performance of ADLs  - Assess for home care needs following discharge   - Consider OT consult to assist with ADL evaluation and planning for discharge  - Provide patient education as appropriate  Outcome: Progressing  Goal: Maintains/Returns to pre admission functional level  Description: INTERVENTIONS:  - Perform BMAT or MOVE assessment daily    - Set and communicate daily mobility goal to care team and patient/family/caregiver  - Collaborate with rehabilitation services on mobility goals if consulted  - Perform Range of Motion    times a day  - Reposition patient every    hours    - Dangle patient    times a day  - Stand patient    times a day  - Ambulate patient    times a day  - Out of bed to chair    times a day   - Out of bed for meals    times a day  - Out of bed for toileting  - Record patient progress and toleration of activity level   Outcome: Progressing     Problem: Potential for Falls  Goal: Patient will remain free of falls  Description: INTERVENTIONS:  - Educate patient/family on patient safety including physical limitations  - Instruct patient to call for assistance with activity   - Consult OT/PT to assist with strengthening/mobility   - Keep Call bell within reach  - Keep bed low and locked with side rails adjusted as appropriate  - Keep care items and personal belongings within reach  - Initiate and maintain comfort rounds  - Make Fall Risk Sign visible to staff  - Offer Toileting every    Hours, in advance of need  - Initiate/Maintain   alarm  - Obtain necessary fall risk management equipment:   - Apply yellow socks and bracelet for high fall risk patients  - Consider moving patient to room near nurses station  Outcome: Progressing     Problem: Prexisting or High Potential for Compromised Skin Integrity  Goal: Skin integrity is maintained or improved  Description: INTERVENTIONS:  - Identify patients at risk for skin breakdown  - Assess and monitor skin integrity  - Assess and monitor nutrition and hydration status  - Monitor labs   - Assess for incontinence   - Turn and reposition patient  - Assist with mobility/ambulation  - Relieve pressure over bony prominences  - Avoid friction and shearing  - Provide appropriate hygiene as needed including keeping skin clean and dry  - Evaluate need for skin moisturizer/barrier cream  - Collaborate with interdisciplinary team   - Patient/family teaching  - Consider wound care consult   Outcome: Progressing

## 2022-05-03 NOTE — QUICK NOTE
Patient had episode of hypoglycemia with blood sugar down to 41   Will change the Lantus from 30 units subQ twice a day to 20 units in the morning and 15 units in the evening

## 2022-05-03 NOTE — H&P
[facility]  H&P- Clementine Balbuena 8/30/1930, 80 y o  female MRN: [id number]  Unit/Bed#: FT 01 Encounter: [id number]  Primary Care Provider: Rere Akbar MD   Date and time admitted to hospital: 5/2/2022  8:03 PM    * Stroke-like symptoms  Assessment & Plan  Son reports increased drowsiness, dizziness, inability to walk, and left sided weakness ("she won't move her left foot" and "her left hand is weak") starting Saturday  Due to underlying dementia and agitation ROS or history is unable to be obtained from patient  /69 (BP Location: Right arm)   Pulse 99   Temp 97 9 °F (36 6 °C) (Temporal)   Resp 18   Ht 5' (1 524 m)   Wt 57 6 kg (126 lb 15 8 oz)   SpO2 98%   BMI 24 80 kg/m²     · Son reports increased drowsiness, dizziness, inability to walk, and left sided weakness ("she won't move her left foot" and "her left hand is weak") starting Saturday  · Son reports patient was at her normal ambulatory baseline Friday (walker), then noticed complete inability to ambulate Saturday  · ED note mentions right arm weakness, however patient not currently able to cooperate with full physical exam  · Not hypoglycemic; has mild hyponatremia as below without other major electrolyte disturbance     · CTA without evidence for acute infarct, bleed, or mass effect, however is showing 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       Hyponatremia  Assessment & Plan  · Sodium 134  · Will give continuous gentle IV NS to support permissive HTN overnight  · AM BMP    Urinary frequency  Assessment & Plan  · ED note reports increasing urinary frequency since Friday without fever  · Son feels this increased frequency was actually agitation with patient repeatedly trying to get up and walk around at home  · Due to dementia as above patient is unable to provide detailed history  · UA currently pending, if positive consider abx     Late onset Alzheimer's disease without behavioral disturbance ([facility] [address] )  Assessment & Plan  · Patient currently confused, not able to answer questions, is agitated, and actively trying to get out of bed  · Son reports patient is at mental baseline currently although more agitated than usal  · Will give one-time dose IM zyprexa and utilize soft restraint belt to avoid fall at this time  · Son is looking for short term rehab or nursing home placement due to difficulty taking care of the patient at home  · Delirium precautions  · Case management consulted     CHF (congestive heart failure) ([facility] [address] )  Assessment & Plan  Wt Readings from Last 3 Encounters:   05/02/22 57 6 kg (126 lb 15 8 oz)   11/21/20 57 6 kg (127 lb)   05/31/20 61 9 kg (136 lb 7 4 oz)     · History of congestive heart failure noted in the patient's chart  · No previous ECHO results able to be found  · Has elevated   · No LE edema appreciated on physical exam  · Is not prescribed outpatient diuretic or HTN medication  · Has weakness/stroke-like symptoms as above  · ECHO ordered as above   · Continue monitoring and consider IV diuresis if s/s of volume overload become apparent       Type 2 diabetes mellitus without complication, with long-term current use of insulin ([facility])  Assessment & Plan  Lab Results   Component Value Date    HGBA1C 7 8 (H) 04/10/2020     · Blood glucose 152  · Will order home humalog 75/25 as hospitals 70/30 formulation; will order 30U qAM and 30U qPM to avoid hypoglycemia overnight in the setting of stroke protocol (increase as appropriate)  · Correctional SSI  · Hypoglycemia protocol; diabetic diet ordered       VTE Pharmacologic Prophylaxis: VTE Score: 8 High Risk (Score >/= 5) - Pharmacological DVT Prophylaxis Ordered: enoxaparin (Lovenox)  Sequential Compression Devices Ordered  Code Status: Level 3 - DNAR and DNI  Discussion with family: Updated  (son) at bedside  Anticipated Length of Stay: Patient will be admitted on an inpatient basis with an anticipated length of stay of greater than 2 midnights secondary to Stroke-like symptoms  Total Time for Visit, including Counseling / Coordination of Care: 45 minutes Greater than 50% of this total time spent on direct patient counseling and coordination of care  Chief Complaint: Weakness and difficulty ambulating    History of Present Illness:  Clair Sharp is a 80 y o  female with a PMH of T2DM, alzheimers, and CHF who presents with weakness and difficulty ambulating  Son reports increased drowsiness, dizziness, inability to walk, and left sided weakness ("she won't move her left foot" and "her left hand is weak") starting Saturday  Due to underlying dementia and agitation ROS or history is unable to be obtained from patient  All questions answered at the bedside to the family's satisfaction  Review of Systems:  Review of Systems   Unable to perform ROS: Dementia       Past Medical and Surgical History:   Past Medical History:   Diagnosis Date    Aortic valve disorder     [facility]congestive heart failure) ([facility] [address] )     Syncope        Past Surgical History:   Procedure Laterality Date    BLADDER SURGERY      CATARACT EXTRACTION      MASTECTOMY Right     last assessed 7/29/14    TOTAL KNEE ARTHROPLASTY         Meds/Allergies:  Prior to Admission medications    Medication Sig Start Date End Date Taking?  Authorizing Provider   BD Insulin Syringe U/F 31G X 5/16" 0 5 ML MISC inject under the skin twice daily 3/14/22   Jason Guillen MD   FREESTYLE LITE test strip TEST 3 TIMES A DAY 9/5/18   Santa Mejia PA-C   HumaLOG Mix 75/25 (75-25) 100 UNIT/ML inject 50 units in the morning and 50 units at night 3/14/22   Jason Guillen MD   Insulin Syringe-Needle U-100 (B-D INS SYRINGE 0 5CC/31GX5/16) 31G X 5/16" 0 5 ML MISC by Does not apply route 8/9/13   Historical Provider, MD   Lancets (FREESTYLE) lancets by Does not apply route 9/9/15   Historical Provider, MD     I have reviewed home medications with patient family member  Allergies: Allergies   Allergen Reactions    Nitrofurantoin        Social History:  Marital Status:    Occupation: N/A  Patient Pre-hospital Living Situation: Home  Patient Pre-hospital Level of Mobility: walks with walker  Patient Pre-hospital Diet Restrictions: Diabetic diet  Substance Use History:   Social History     Substance and Sexual Activity   Alcohol Use No     Social History     Tobacco Use   Smoking Status Never Smoker   Smokeless Tobacco Never Used   Tobacco Comment    tobacco non user     Social History     Substance and Sexual Activity   Drug Use No       Family History:  History reviewed  No pertinent family history  Physical Exam:     Vitals:   Blood Pressure: 139/69 (05/02/22 1844)  Pulse: 99 (05/02/22 1844)  Temperature: 97 9 °F (36 6 °C) (05/02/22 1844)  Temp Source: Temporal (05/02/22 1844)  Respirations: 18 (05/02/22 1844)  Height: 5' (152 4 cm) (05/02/22 1844)  Weight - Scale: 57 6 kg (126 lb 15 8 oz) (05/02/22 1844)  SpO2: 98 % (05/02/22 1844)    Physical Exam  Constitutional:       Appearance: She is obese  HENT:      Head: Normocephalic and atraumatic  Right Ear: External ear normal       Left Ear: External ear normal       Nose: Nose normal    Eyes:      General:         Right eye: No discharge  Left eye: No discharge  Cardiovascular:      Rate and Rhythm: Normal rate and regular rhythm  Heart sounds: No murmur heard  Pulmonary:      Effort: No respiratory distress  Breath sounds: Normal breath sounds  Abdominal:      General: There is no distension  Palpations: Abdomen is soft  There is no mass  Tenderness: There is no abdominal tenderness     Musculoskeletal:         General: No tenderness or deformity  Skin:     General: Skin is warm and dry  Capillary Refill: Capillary refill takes less than 2 seconds  Neurological:      Mental Status: She is disoriented  Psychiatric:      Comments: Patient is agitated             Additional Data:     Lab Results:  Results from last 7 days   Lab Units 05/02/22  1853   WBC Thousand/uL 10 81*   HEMOGLOBIN g/dL 13 1   HEMATOCRIT % 38 6   PLATELETS Thousands/uL 206   NEUTROS PCT % 78*   LYMPHS PCT % 13*   MONOS PCT % 7   EOS PCT % 2     Results from last 7 days   Lab Units 05/02/22  1853   SODIUM mmol/L 134*   POTASSIUM mmol/L 3 7   CHLORIDE mmol/L 99*   CO2 mmol/L 25   BUN mg/dL 20   CREATININE mg/dL 0 99   ANION GAP mmol/L 10   CALCIUM mg/dL 8 8   ALBUMIN g/dL 3 2*   TOTAL BILIRUBIN mg/dL 0 46   ALK PHOS U/L 132*   ALT U/L 24   AST U/L 26   GLUCOSE RANDOM mg/dL 152*                       Imaging: Reviewed radiology reports from this admission including: chest xray and CTA head  CTA head with and without contrast   Final Result by Angel Chopra MD (05/02 2249)         1  No evidence of acute vascular territorial infarction, intracranial hemorrhage or mass effect  2   Occlusion versus severe stenosis of the right posterior cerebral artery distal to the P1 segment, of uncertain chronicity  3   Multifocal moderate to severe stenoses in the carotid siphons  Workstation performed: [id number]         XR chest 1 view portable    (Results Pending)   MRI Inpatient Order    (Results Pending)       EKG and Other Studies Reviewed on Admission:   · EKG: NSR  HR 93     ** Please Note: This note has been constructed using a voice recognition system   **

## 2022-05-04 LAB
ANION GAP SERPL CALCULATED.3IONS-SCNC: 9 MMOL/L (ref 4–13)
BUN SERPL-MCNC: 17 MG/DL (ref 5–25)
CALCIUM SERPL-MCNC: 8.7 MG/DL (ref 8.3–10.1)
CHLORIDE SERPL-SCNC: 104 MMOL/L (ref 100–108)
CO2 SERPL-SCNC: 25 MMOL/L (ref 21–32)
CREAT SERPL-MCNC: 0.87 MG/DL (ref 0.6–1.3)
ERYTHROCYTE [DISTWIDTH] IN BLOOD BY AUTOMATED COUNT: 13.6 % (ref 11.6–15.1)
GFR SERPL CREATININE-BSD FRML MDRD: 58 ML/MIN/1.73SQ M
GLUCOSE SERPL-MCNC: 115 MG/DL (ref 65–140)
GLUCOSE SERPL-MCNC: 122 MG/DL (ref 65–140)
GLUCOSE SERPL-MCNC: 146 MG/DL (ref 65–140)
GLUCOSE SERPL-MCNC: 182 MG/DL (ref 65–140)
GLUCOSE SERPL-MCNC: 233 MG/DL (ref 65–140)
HCT VFR BLD AUTO: 39.9 % (ref 34.8–46.1)
HGB BLD-MCNC: 12.8 G/DL (ref 11.5–15.4)
MCH RBC QN AUTO: 28.9 PG (ref 26.8–34.3)
MCHC RBC AUTO-ENTMCNC: 32.1 G/DL (ref 31.4–37.4)
MCV RBC AUTO: 90 FL (ref 82–98)
PLATELET # BLD AUTO: 178 THOUSANDS/UL (ref 149–390)
PMV BLD AUTO: 11.5 FL (ref 8.9–12.7)
POTASSIUM SERPL-SCNC: 4.4 MMOL/L (ref 3.5–5.3)
RBC # BLD AUTO: 4.43 MILLION/UL (ref 3.81–5.12)
SODIUM SERPL-SCNC: 138 MMOL/L (ref 136–145)
WBC # BLD AUTO: 9.28 THOUSAND/UL (ref 4.31–10.16)

## 2022-05-04 PROCEDURE — 92526 ORAL FUNCTION THERAPY: CPT

## 2022-05-04 PROCEDURE — 80048 BASIC METABOLIC PNL TOTAL CA: CPT | Performed by: INTERNAL MEDICINE

## 2022-05-04 PROCEDURE — 82948 REAGENT STRIP/BLOOD GLUCOSE: CPT

## 2022-05-04 PROCEDURE — 85027 COMPLETE CBC AUTOMATED: CPT | Performed by: INTERNAL MEDICINE

## 2022-05-04 PROCEDURE — 99233 SBSQ HOSP IP/OBS HIGH 50: CPT | Performed by: INTERNAL MEDICINE

## 2022-05-04 RX ORDER — OLANZAPINE 10 MG/1
5 INJECTION, POWDER, LYOPHILIZED, FOR SOLUTION INTRAMUSCULAR ONCE
Status: COMPLETED | OUTPATIENT
Start: 2022-05-04 | End: 2022-05-04

## 2022-05-04 RX ADMIN — ATORVASTATIN CALCIUM 40 MG: 40 TABLET, FILM COATED ORAL at 18:22

## 2022-05-04 RX ADMIN — ENOXAPARIN SODIUM 40 MG: 40 INJECTION SUBCUTANEOUS at 09:15

## 2022-05-04 RX ADMIN — INSULIN LISPRO 2 UNITS: 100 INJECTION, SOLUTION INTRAVENOUS; SUBCUTANEOUS at 09:27

## 2022-05-04 RX ADMIN — INSULIN ASPART 20 UNITS: 100 INJECTION, SUSPENSION SUBCUTANEOUS at 09:16

## 2022-05-04 RX ADMIN — OLANZAPINE 5 MG: 10 INJECTION, POWDER, FOR SOLUTION INTRAMUSCULAR at 22:22

## 2022-05-04 RX ADMIN — CLOPIDOGREL BISULFATE 75 MG: 75 TABLET ORAL at 09:15

## 2022-05-04 RX ADMIN — WATER 10 ML: 1 INJECTION INTRAMUSCULAR; INTRAVENOUS; SUBCUTANEOUS at 22:22

## 2022-05-04 RX ADMIN — INSULIN ASPART 15 UNITS: 100 INJECTION, SUSPENSION SUBCUTANEOUS at 18:21

## 2022-05-04 RX ADMIN — ASPIRIN 81 MG: 81 TABLET, CHEWABLE ORAL at 09:15

## 2022-05-04 NOTE — ASSESSMENT & PLAN NOTE
Son reports increased drowsiness, dizziness, inability to walk, and left sided weakness ("she won't move her left foot" and "her left hand is weak") starting Saturday  Due to underlying dementia and agitation ROS or history is unable to be obtained from patient  /57 (BP Location: Left arm)   Pulse 92   Temp (!) 97 3 °F (36 3 °C) (Axillary)   Resp 18   Ht 5' (1 524 m)   Wt 57 2 kg (126 lb)   SpO2 96%   BMI 24 61 kg/m²     · Son reports increased drowsiness, dizziness, inability to walk, and left sided weakness ("she won't move her left foot" and "her left hand is weak") starting Saturday  · Son reports patient was at her normal ambulatory baseline Friday (walker), then noticed complete inability to ambulate Saturday  · ED note mentions right arm weakness, however patient not currently able to cooperate with full physical exam  · Not hypoglycemic; has mild hyponatremia as below without other major electrolyte disturbance  · CTA without evidence for acute infarct, bleed, or mass effect, however is showing occlusion vs severe stenosis of the right posterior cerebral artery and 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  · Discontinue Tele monitoring right now, permissive HTN and neuro checks per stroke protocol, daily ASA and atorvastatin  · Neurology consulted and their input is as follows    Per Dr Radha Chen from Neurology    Initial CT scan of the head was unremarkable, CTA of the head and neck showed severe stenosis/occlusion of the right PCA, with 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

## 2022-05-04 NOTE — SPEECH THERAPY NOTE
Speech Language/Pathology     Speech/Language Pathology Progress Note     Patient Name: Clair Sharp    Today's Date: 5/4/2022     Problem List  Principal Problem:    Stroke-like symptoms  Active Problems:    Type 2 diabetes mellitus without complication, with long-term current use of insulin (HCC)    CHF (congestive heart failure) ([facility] [address] )    Late onset Alzheimer's disease without behavioral disturbance ([facility])    Urinary frequency    Hyponatremia     Recommendations:   Diet: regular diet and thin liquids   Meds: whole with liquid   Feeding assistance: 1:1 supervision and assist as needed  Frequent Oral care: 2-4x/day  Aspiration precautions and compensatory swallowing strategies: upright posture, only feed when fully alert, slow rate of feeding, small bites/sips and alternating bites and sips  Other Recommendations/ considerations: Liquid wash  / alternate solids and liquids      Subjective:  Patient received sleeping, wakes for treatment session but remains lethargic throughout  Previous/current diet: regular/thin with close feeding assistance  Objective: The following consistencies were tested thin liquids by cup and straw, regular solids  Patient presents with reduced bolus containment, manipulation and control, requires sips of liquid to aide in bolus cohesion  Mastication judged to be prolonged yet complete  No overt s/s of aspiration or distress with close verbal cues and assistance  Vocal quality noted to remain clear  Assessment:  Oropharyngeal swallow function appears uncahnged from previous encounters  Tolerated current diet with a moderate amount of feeding assistance  Note, patient did appear lethargic  RN reports same  Patient continues to present with disorganized mastication and bolus control overall, but does well with added sips of liquid for cohesion and transfer       Plan:  Continue current diet with   Will continue follow as indicated x1 as session shortened today 2/2 pt lethargy         Yoandy [address], [address] [facility]  Speech-Language Pathologist  [place] #[id number]  [place] #[id number]

## 2022-05-04 NOTE — PROGRESS NOTES
Patient alert with confusion and restlessness  Patient repositioned and redirected during shift  Denies pain  Patient continues with restraint  Tolerates fluids when given to patient and tolerates medication  No signs of hyperglycemia or hypoglycemia during the shift  Patient slept on and off during shift  No deviation in assessment finding noted when compared to previous documentation  Will continue to monitor

## 2022-05-04 NOTE — CASE MANAGEMENT
Case Management Discharge Planning Note    Patient name Clementine Balbuena  Location [address] 212/-01 MRN [id number]  : 1930 Date 2022       Current Admission Date: 2022  Current Admission Diagnosis:Stroke-like symptoms   Patient Active Problem List    Diagnosis Date Noted    Hyponatremia 2022    Stroke-like symptoms 2022    Urinary frequency 2022    Pressure injury of left heel, unstageable ([facility] ) 2020    Syncope 10/25/2019    Pressure injury of left buttock, stage 1 10/25/2019    Late onset Alzheimer's disease without behavioral disturbance ([facility] ) 10/25/2019    Need for immunization against influenza 2018    Type 2 diabetes mellitus without complication, with long-term current use of insulin ([facility] ) 2018    CHF (congestive heart failure) ([place] [address] ) 2018    Aortic valve disorder 2018      LOS (days): 2  Geometric Mean LOS (GMLOS) (days): 3 00  Days to GMLOS:1 4     OBJECTIVE:  Risk of Unplanned Readmission Score: 12         Current admission status: Inpatient   Preferred Pharmacy:   [facility] [address] [place], [place] [address]   [address] [facility] [address] [facility]  Phone: [phone] Fax: [phone]    Primary Care Provider: Rere Akbar MD    Primary Insurance: MEDICARE  [facility]    DISCHARGE DETAILS:    Discharge planning discussed with[de-identified] Naz Vidal of Choice: Yes  Comments - Freedom of Choice: Pt is agreeable to [facility] where pt has been in the past   Rigo Ojeda from [facility] informed  CM contacted family/caregiver?: Yes  Were Treatment Team discharge recommendations reviewed with patient/caregiver?: Yes  Did patient/caregiver verbalize understanding of patient care needs?: Yes       Contacts  Patient Contacts: Nestor Cm  Relationship to Patient[de-identified] Family              Other Referral/Resources/Interventions Provided:  Interventions: Short Term Rehab  Referral Comments:  [facility] is agreeable and family has accepted bed  Pt still in restraints-will have to be off restraints for 24hrs prior to d/c    [facility] informed and will put in a d/c order    Would you like to participate in our [address] service program?  : No - Declined    Treatment Team Recommendation: Short Term Rehab  Discharge Destination Plan[de-identified] Short Term Rehab  Transport at Discharge : [facility]

## 2022-05-04 NOTE — DISCHARGE INSTR - OTHER ORDERS
Skin care Plan:  1-Cleanse sacro-buttocks with soap and water  Apply Hydraguard to bilateral buttock twice a day  2-Turn/reposition every 2 hours for pressure re-distribution on skin   3-Elevate heels to offload pressure  4-Moisturize skin daily with skin nourishing cream  5-Ehob cushion in chair when out of bed    6-Hydraguard to bilateral heels twice a day

## 2022-05-04 NOTE — QUICK NOTE
81 y/o female with CHF, dementia, DM, who presents with symptoms of left sided weakness, difficulty ambulating, increased drowsiness, dizziness since 4/30  BP on presentation 139/69  Work up included: CTA head and neck: occlusion vs severe stenosis of the right PCA distal to the P1 segment of uncertain chronicity and 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  MRI brain: recent infarcts in the right PCA including the medial temporal lobe/hippocampus, occipital lobe, and right centrum semiovale  A1C 8 2  Lipid panel with   Echo: EF 60%  No regional wall motion abnormalities  Bilateral atrium size normal      Plan:  - DAPT with aspirin 81 mg and Plavix 75 mg x 21 days, followed by aspirin monotherapy  - Continue atorvastatin  - Normotension; avoid hypotension  - Modification of vascular risk factors per primary team  - PT/OT/ST  - Telemetry  - Monitor neuro exam; notify with any changes  - Medical management and supportive care per primary team  Correction of metabolic or infectious disturbances   - No further inpatient neurology recommendations at this time  Please call with any questions  Andrey Garcia will need follow up in in 6 weeks with neurovascular attending/AP  She will not require outpatient neurological testing

## 2022-05-04 NOTE — PROGRESS NOTES
[facility]  Progress Note - Martha [facility] 8/30/1930, 80 y o  female MRN: [id number]  Unit/Bed#: -Lora Encounter: [id number]  Primary Care Provider: Caridad Butler MD   Date and time admitted to hospital: 5/2/2022  8:03 PM    * Stroke-like symptoms  Assessment & Plan  Son reports increased drowsiness, dizziness, inability to walk, and left sided weakness ("she won't move her left foot" and "her left hand is weak") starting Saturday  Due to underlying dementia and agitation ROS or history is unable to be obtained from patient  /57 (BP Location: Left arm)   Pulse 92   Temp (!) 97 3 °F (36 3 °C) (Axillary)   Resp 18   Ht 5' (1 524 m)   Wt 57 2 kg (126 lb)   SpO2 96%   BMI 24 61 kg/m²     · Son reports increased drowsiness, dizziness, inability to walk, and left sided weakness ("she won't move her left foot" and "her left hand is weak") starting Saturday  · Son reports patient was at her normal ambulatory baseline Friday (walker), then noticed complete inability to ambulate Saturday  · ED note mentions right arm weakness, however patient not currently able to cooperate with full physical exam  · Not hypoglycemic; has mild hyponatremia as below without other major electrolyte disturbance     · CTA without evidence for acute infarct, bleed, or mass effect, however is showing occlusion vs severe stenosis of the right posterior cerebral artery and 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  · Discontinue Tele monitoring right now, permissive HTN and neuro checks per stroke protocol, daily ASA and atorvastatin  · Neurology consulted and their input is as follows    Per Dr Edwar Hernandez from Neurology    Initial CT scan of the head was unremarkable, CTA of the head and neck showed severe stenosis/occlusion of the right PCA, with 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, her labs revealed significant hyperlipidemia, and on examination patient had subtle left-sided weakness with neglect and field cut to suspect a right PCA infarct  Patient admitted on stroke pathway, MRI of the brain done subsequently confirms the presence of a right PCA infarct of recent duration, patient started on dual anti-platelet therapy,/statin therapy will recommend adequate blood pressure and, blood sugar control and patient may need inpatient rehab  At baseline she also suffers from dementia late onset Alzheimer's type and hence recommend delirium precautions         Hyponatremia  Assessment & Plan  · Sodium 134  · Will give continuous gentle IV NS to support permissive HTN overnight  · Periodically monitoring of BMP    Results from last 7 days   Lab Units 05/04/22  0448 05/02/22  1853   SODIUM mmol/L 138 134*   POTASSIUM mmol/L 4 4 3 7   CHLORIDE mmol/L 104 99*   CO2 mmol/L 25 25   ANION GAP mmol/L 9 10   BUN mg/dL 17 20   CREATININE mg/dL 0 87 0 99   CALCIUM mg/dL 8 7 8 8   ALBUMIN g/dL  --  3 2*   TOTAL BILIRUBIN mg/dL  --  0 46   ALK PHOS U/L  --  132*   ALT U/L  --  24   AST U/L  --  26   EGFR ml/min/1 73sq m 58 49   GLUCOSE RANDOM mg/dL 182* 152*         Urinary frequency  Assessment & Plan  · ED note reports increasing urinary frequency since Friday without fever  · Son feels this increased frequency was actually agitation with patient repeatedly trying to get up and walk around at home  · Due to dementia as above patient is unable to provide detailed history  · No evidence of any urine infection        Late onset Alzheimer's disease without behavioral disturbance ([facility] [address] )  Assessment & Plan  · Patient currently confused, not able to answer questions properly but compared to admission the agitation has significantly decreased  · Son reports patient is at mental baseline  · Will give one-time dose IM zyprexa and utilize soft restraint belt to avoid fall at this time  · Son is looking for short term rehab or nursing home placement due to difficulty taking care of the patient at home  · Delirium precautions  · Case management consulted, as patient will likely need placement  · No need of any restraints or one-to-one observation    CHF (congestive heart failure) ([facility] [address] )  Assessment & Plan  Wt Readings from Last 3 Encounters:   05/03/22 57 2 kg (126 lb)   11/21/20 57 6 kg (127 lb)   05/31/20 61 9 kg (136 lb 7 4 oz)     · History of congestive heart failure noted in the patient's chart  · No previous ECHO results able to be found  · Has elevated   · No LE edema appreciated on physical exam  · Is not prescribed outpatient diuretic or HTN medication  · Has weakness/stroke-like symptoms as above  · Does not appear to be in overload  No need of intravenous diuresis  · Continue monitoring and consider IV diuresis if s/s of volume overload become apparent       Type 2 diabetes mellitus without complication, with long-term current use of insulin (HCC)  Assessment & Plan  Lab Results   Component Value Date    HGBA1C 8 2 (H) 05/03/2022     · Blood glucose 152  · Will order home humalog 75/25 as hospitals 70/30 formulation; will order 30U qAM and 30U qPM to avoid hypoglycemia overnight in the setting of stroke protocol (increase as appropriate)  · Correctional SSI  · Hypoglycemia protocol; diabetic diet ordered  TE Pharmacologic Prophylaxis: Enoxaparin (Lovenox)    Patient Centered Rounds: I have performed bedside rounds with nursing staff today    Discussions with Specialists or Other Care Team Provider:  Care team  Education and Discussions with Family / Patient:  Patient    Current Length of Stay: 2 day(s)  Current Patient Status: Inpatient     Certification Statement: The patient will continue to require additional inpatient hospital stay due to Stroke-like symptoms  Discharge Plan / Estimated Discharge Date:  1-2 days    Code Status: Level 3 - DNAR and DNI  ______________________________________________________________________________    Subjective:   Patient seen and examined by me  Patient does not have any chest pain, palpitations or diaphoresis  Patient is confused but the agitation is significantly better  Patient does not need any one-to-one observation or restraints  No high fever, chills or rigors  Does seem to have some weakness    Objective:   Vitals: Blood pressure 104/57, pulse 92, temperature (!) 97 3 °F (36 3 °C), temperature source Axillary, resp  rate 18, height 5' (1 524 m), weight 57 2 kg (126 lb), SpO2 96 %      Physical Exam:   General appearance: alert, slowed mentation, appears stated age and cooperative  Constitutional- looks a little weak  HEENT - atraumatic and normocephalic  Neck- supple  Skin - no fresh rash  Extremities no fresh focal deformities  Cardiovascular- S1-S2 heard  Respiratory- bilateral air entry present, no crackles or rhonchi  Skin - no fresh rash  Abdomen - normal bowel sounds present, no rebound tenderness  CNS- No fresh focal deficits  Psych- no acute psychosis     Additional Data:   Labs:  Results from last 7 days   Lab Units 05/04/22  0448 05/02/22  1853   WBC Thousand/uL 9 28 10 81*   HEMOGLOBIN g/dL 12 8 13 1   HEMATOCRIT % 39 9 38 6   MCV fL 90 85   PLATELETS Thousands/uL 178 206     Results from last 7 days   Lab Units 05/04/22  0448 05/02/22  1853   SODIUM mmol/L 138 134*   POTASSIUM mmol/L 4 4 3 7   CHLORIDE mmol/L 104 99*   CO2 mmol/L 25 25   ANION GAP mmol/L 9 10   BUN mg/dL 17 20   CREATININE mg/dL 0 87 0 99   CALCIUM mg/dL 8 7 8 8   ALBUMIN g/dL  --  3 2*   TOTAL BILIRUBIN mg/dL  --  0 46   ALK PHOS U/L  --  132*   ALT U/L  --  24   AST U/L  --  26   EGFR ml/min/1 73sq m 58 49   GLUCOSE RANDOM mg/dL 182* 152*             Results from last 7 days   Lab Units 05/02/22  1853   NT-PRO BNP pg/mL 982*          Results from last 7 days   Lab Units 05/04/22  1546 05/04/22  1218 05/04/22  0814 05/03/22  2153 05/03/22  1703 05/03/22  1553 05/03/22  0741 05/03/22  0116   POC GLUCOSE mg/dl 122 115 233* 259* 155* 41* 227* 154* Results from last 7 days   Lab Units 05/03/22  0544   HEMOGLOBIN A1C % 8 2*         * I Have Reviewed All Lab Data Listed Above  Cultures:               Imaging:  Imaging Reports Reviewed Today Include:   CTA head with and without contrast    Result Date: 5/2/2022  Impression: 1  No evidence of acute vascular territorial infarction, intracranial hemorrhage or mass effect  2   Occlusion versus severe stenosis of the right posterior cerebral artery distal to the P1 segment, of uncertain chronicity  3   Multifocal moderate to severe stenoses in the carotid siphons  Workstation performed: [id number]     XR chest 1 view portable    Result Date: 5/3/2022  Impression: No acute cardiopulmonary disease  Workstation performed: [id number]     MRI brain wo contrast    Result Date: 5/3/2022  Impression: Recent infarcts are identified in the right posterior cerebral artery which diffusion to include the medial temporal lobe/hippocampus, occipital lobe, and right centrum semiovale  Chronic lacunar infarcts bilateral cerebellum  Workstation performed: [id number]     XR follow up    Result Date: 5/3/2022  Impression: No radiopaque foreign bodies identified within the abdomen, pelvis or proximal thighs   Workstation performed: [id number]     Scheduled Meds:  Current Facility-Administered Medications   Medication Dose Route Frequency Provider Last Rate    aspirin  81 mg Oral Daily [place], [place]      atorvastatin  40 mg Oral QPM [place], [place]      clopidogrel  75 mg Oral Daily BRIAN Rosen      enoxaparin  40 mg Subcutaneous Daily [place], [place]      insulin aspart protamine-insulin aspart  15 Units Subcutaneous QPM Kirk Patricio MD      insulin aspart protamine-insulin aspart  20 Units Subcutaneous QAM Kirk Patricio MD      insulin lispro  1-5 Units Subcutaneous TID AC Daya Goodwin [place]      insulin lispro  1-5 Units Subcutaneous HS Daya Walters PA-C      sodium chloride  50 mL/hr Intravenous Continuous Anselm Silvino Irrigon PRISCILLA sanders 50 mL/hr (05/03/22 0250)       MD [facility] Internal Medicine    ** Please Note: This note has been constructed using a voice recognition system   **

## 2022-05-04 NOTE — ASSESSMENT & PLAN NOTE
Wt Readings from Last 3 Encounters:   05/03/22 57 2 kg (126 lb)   11/21/20 57 6 kg (127 lb)   05/31/20 61 9 kg (136 lb 7 4 oz)     · History of congestive heart failure noted in the patient's chart  · No previous ECHO results able to be found  · Has elevated   · No LE edema appreciated on physical exam  · Is not prescribed outpatient diuretic or HTN medication  · Has weakness/stroke-like symptoms as above  · Does not appear to be in overload    No need of intravenous diuresis  · Continue monitoring and consider IV diuresis if s/s of volume overload become apparent

## 2022-05-04 NOTE — ASSESSMENT & PLAN NOTE
· Patient currently confused, not able to answer questions properly but compared to admission the agitation has significantly decreased  · Son reports patient is at mental baseline  · Will give one-time dose IM zyprexa and utilize soft restraint belt to avoid fall at this time  · Son is looking for short term rehab or nursing home placement due to difficulty taking care of the patient at home  · Delirium precautions  · Case management consulted, as patient will likely need placement  · No need of any restraints or one-to-one observation

## 2022-05-04 NOTE — ASSESSMENT & PLAN NOTE
Lab Results   Component Value Date    HGBA1C 8 2 (H) 05/03/2022     · Blood glucose 152  · Will order home humalog 75/25 as hospitals 70/30 formulation; will order 30U qAM and 30U qPM to avoid hypoglycemia overnight in the setting of stroke protocol (increase as appropriate)  · Correctional SSI  · Hypoglycemia protocol; diabetic diet ordered

## 2022-05-04 NOTE — ASSESSMENT & PLAN NOTE
· Sodium 134  · Will give continuous gentle IV NS to support permissive HTN overnight  · Periodically monitoring of BMP    Results from last 7 days   Lab Units 05/04/22  0448 05/02/22  1853   SODIUM mmol/L 138 134*   POTASSIUM mmol/L 4 4 3 7   CHLORIDE mmol/L 104 99*   CO2 mmol/L 25 25   ANION GAP mmol/L 9 10   BUN mg/dL 17 20   CREATININE mg/dL 0 87 0 99   CALCIUM mg/dL 8 7 8 8   ALBUMIN g/dL  --  3 2*   TOTAL BILIRUBIN mg/dL  --  0 46   ALK PHOS U/L  --  132*   ALT U/L  --  24   AST U/L  --  26   EGFR ml/min/1 73sq m 58 49   GLUCOSE RANDOM mg/dL 182* 152*

## 2022-05-04 NOTE — WOUND OSTOMY CARE
Progress Note - Wound   Jame Brooks 80 y o  female MRN: [id number]  Unit/Bed#: -01 Encounter: [id number]      Assessment:   Patient admitted due to stroke-like symptoms  History of CHF, diabetes, late onset Alzheimer's  Wound care consulted for buttock wound  Patient agreeable to assessment, alert and oriented to self, incontinent of bowel and bladder, wedges in use for turning and repositioning, heels elevated off of mattress, is dependent for care  1  Pressure injury right buttock, unstageable-POA- Wound is round in shape, true depth unknown, skin is intact however the area is boggy during palpation, there is approx 10% yellow tissue visible and 90% non-blanchable red skin, no drainage noted  Nuzhat-wound is dry, intact, blanchable pink skin  2  Left buttock- Unknown etiology, appears to be related to friction  Wound is irregular in shape, partial thickness, abraded skin, 100% blanchable red skin that appears to be re-epithelial tissue, skin is intact, no drainage noted  Nuzhat-wound is dry, intact, blanchable pink skin  3  Sacrum, bilateral hips, elbows, and heels are dry, intact, blanchable pink skin  Educated patient on importance of frequent offloading of pressure via turning, repositioning, and weight-shifting  No induration, fluctuance, odor, warmth, or purulence noted to the above noted wounds  Patient tolerated well, pain noted to the right buttock wound  Primary nurse aware of plan of care  See flow sheets for more detailed assessment findings  Will follow along  Skin care Plan:  1-Cleanse sacro-buttocks with soap and water  Apply Allevyn foam to right buttock wound  Olegario with T for treatment  Change every three days and PRN  Peel back and check skin q-shift  2-Turn/reposition q2h  for pressure re-distribution on skin   3-Elevate heels to offload pressure  4-Moisturize skin daily with skin nourishing cream  5-Ehob cushion in chair when out of bed    6-Hydraguard to bilateral heels BID and PRN  7-Left buttock wound- Cleanse with soap and water, pat dry  Apply Hydraguard to left buttock BID and PRN  8-Apply accu-max pump to mattress  Wound 05/03/22 Buttocks Medial (Active)   Wound Image   05/04/22 0956   Wound Description Dry;Pink 05/04/22 0956   Pressure Injury Stage     Nuzhat-wound Assessment Dry; Intact 05/04/22 0956   Wound Length (cm) 7 cm 05/04/22 0956   Wound Width (cm) 2 cm 05/04/22 0956   Wound Depth (cm) 0 cm 05/04/22 0956   Wound Surface Area (cm^2) 14 cm^2 05/04/22 0956   Wound Volume (cm^3) 0 cm^3 05/04/22 0956   Calculated Wound Volume (cm^3) 0 cm^3 05/04/22 0956   Tunneling 0 cm 05/04/22 0956   Undermining 0 05/04/22 0956   Drainage Amount None 05/04/22 0956   Non-staged Wound Description Not applicable 29/08/99 6747   Treatments Cleansed;Site care 05/04/22 0956   Dressing Moisture barrier 05/04/22 0956   Wound packed? No 05/04/22 0956   Dressing Changed New 05/04/22 0956   Patient Tolerance Tolerated well 05/04/22 0956       Wound 05/04/22 Buttocks Right (Active)   Wound Image   05/04/22 0957   Wound Description Dry;Non-blanchable erythema;Yellow 05/04/22 0957   Pressure Injury Stage U 05/04/22 0957   Nuzhat-wound Assessment Dry; Intact; Pink 05/04/22 0957   Wound Length (cm) 1 2 cm 05/04/22 0957   Wound Width (cm) 1 2 cm 05/04/22 0957   Wound Depth (cm) 0 cm 05/04/22 0957   Wound Surface Area (cm^2) 1 44 cm^2 05/04/22 0957   Wound Volume (cm^3) 0 cm^3 05/04/22 0957   Calculated Wound Volume (cm^3) 0 cm^3 05/04/22 0957   Tunneling 0 cm 05/04/22 0957   Undermining 0 05/04/22 0957   Drainage Amount None 05/04/22 0957   Non-staged Wound Description Not applicable 65/42/13 3838   Treatments Cleansed;Site care 05/04/22 0957   Dressing Moisture barrier 05/04/22 0957   Wound packed?  No 05/04/22 0957   Dressing Changed New 05/04/22 0957   Patient Tolerance Tolerated well 05/04/22 0957     Call or tigertext with any questions  Wound Care will continue to follow    [facility] BSN RN [facility]  Wound care

## 2022-05-04 NOTE — PLAN OF CARE
Problem: MOBILITY - ADULT  Goal: Maintain or return to baseline ADL function  Description: INTERVENTIONS:  -  Assess patient's ability to carry out ADLs; assess patient's baseline for ADL function and identify physical deficits which impact ability to perform ADLs (bathing, care of mouth/teeth, toileting, grooming, dressing, etc )  - Assess/evaluate cause of self-care deficits   - Assess range of motion  - Assess patient's mobility; develop plan if impaired  - Assess patient's need for assistive devices and provide as appropriate  - Encourage maximum independence but intervene and supervise when necessary  - Involve family in performance of ADLs  - Assess for home care needs following discharge   - Consider OT consult to assist with ADL evaluation and planning for discharge  - Provide patient education as appropriate  Outcome: Progressing  Goal: Maintains/Returns to pre admission functional level  Description: INTERVENTIONS:  - Perform BMAT or MOVE assessment daily    - Set and communicate daily mobility goal to care team and patient/family/caregiver  - Collaborate with rehabilitation services on mobility goals if consulted  - Perform Range of Motion 3 times a day  - Reposition patient every 2 hours    - Dangle patient 3 times a day  - Stand patient 3 times a day  - Ambulate patient 3 times a day  - Out of bed to chair 3 times a day   - Out of bed for meals 3 times a day  - Out of bed for toileting  - Record patient progress and toleration of activity level   Outcome: Progressing     Problem: Potential for Falls  Goal: Patient will remain free of falls  Description: INTERVENTIONS:  - Educate patient/family on patient safety including physical limitations  - Instruct patient to call for assistance with activity   - Consult OT/PT to assist with strengthening/mobility   - Keep Call bell within reach  - Keep bed low and locked with side rails adjusted as appropriate  - Keep care items and personal belongings within reach  - Initiate and maintain comfort rounds  - Make Fall Risk Sign visible to staff  - Offer Toileting every 2 Hours, in advance of need  - Initiate/Maintain bed alarm  - Obtain necessary fall risk management equipment: call bell within  reach  - Apply yellow socks and bracelet for high fall risk patients  - Consider moving patient to room near nurses station  Outcome: Progressing     Problem: Prexisting or High Potential for Compromised Skin Integrity  Goal: Skin integrity is maintained or improved  Description: INTERVENTIONS:  - Identify patients at risk for skin breakdown  - Assess and monitor skin integrity  - Assess and monitor nutrition and hydration status  - Monitor labs   - Assess for incontinence   - Turn and reposition patient  - Assist with mobility/ambulation  - Relieve pressure over bony prominences  - Avoid friction and shearing  - Provide appropriate hygiene as needed including keeping skin clean and dry  - Evaluate need for skin moisturizer/barrier cream  - Collaborate with interdisciplinary team   - Patient/family teaching  - Consider wound care consult   Outcome: Progressing

## 2022-05-04 NOTE — ASSESSMENT & PLAN NOTE
· ED note reports increasing urinary frequency since Friday without fever  · Son feels this increased frequency was actually agitation with patient repeatedly trying to get up and walk around at home  · Due to dementia as above patient is unable to provide detailed history  · No evidence of any urine infection

## 2022-05-05 LAB
ATRIAL RATE: 93 BPM
FLUAV RNA RESP QL NAA+PROBE: NEGATIVE
FLUBV RNA RESP QL NAA+PROBE: NEGATIVE
GLUCOSE SERPL-MCNC: 144 MG/DL (ref 65–140)
GLUCOSE SERPL-MCNC: 151 MG/DL (ref 65–140)
GLUCOSE SERPL-MCNC: 154 MG/DL (ref 65–140)
GLUCOSE SERPL-MCNC: 185 MG/DL (ref 65–140)
P AXIS: 51 DEGREES
PR INTERVAL: 158 MS
QRS AXIS: 245 DEGREES
QRSD INTERVAL: 122 MS
QT INTERVAL: 378 MS
QTC INTERVAL: 469 MS
RSV RNA RESP QL NAA+PROBE: NEGATIVE
SARS-COV-2 RNA RESP QL NAA+PROBE: NEGATIVE
T WAVE AXIS: 14 DEGREES
VENTRICULAR RATE: 93 BPM

## 2022-05-05 PROCEDURE — 82948 REAGENT STRIP/BLOOD GLUCOSE: CPT

## 2022-05-05 PROCEDURE — 99233 SBSQ HOSP IP/OBS HIGH 50: CPT | Performed by: INTERNAL MEDICINE

## 2022-05-05 PROCEDURE — 0241U HB NFCT DS VIR RESP RNA 4 TRGT: CPT | Performed by: INTERNAL MEDICINE

## 2022-05-05 PROCEDURE — 93010 ELECTROCARDIOGRAM REPORT: CPT | Performed by: INTERNAL MEDICINE

## 2022-05-05 RX ORDER — ATORVASTATIN CALCIUM 40 MG/1
40 TABLET, FILM COATED ORAL EVERY EVENING
Refills: 0
Start: 2022-05-05 | End: 2022-05-23 | Stop reason: SDUPTHER

## 2022-05-05 RX ORDER — OLANZAPINE 5 MG/1
5 TABLET, ORALLY DISINTEGRATING ORAL
Refills: 0
Start: 2022-05-05 | End: 2022-05-13

## 2022-05-05 RX ORDER — OLANZAPINE 5 MG/1
5 TABLET, ORALLY DISINTEGRATING ORAL
Status: DISCONTINUED | OUTPATIENT
Start: 2022-05-05 | End: 2022-05-12 | Stop reason: HOSPADM

## 2022-05-05 RX ORDER — ASPIRIN 81 MG/1
81 TABLET, CHEWABLE ORAL DAILY
Refills: 0
Start: 2022-05-06 | End: 2022-05-23 | Stop reason: SDUPTHER

## 2022-05-05 RX ORDER — INSULIN ASPART 100 [IU]/ML
20 INJECTION, SUSPENSION SUBCUTANEOUS EVERY MORNING
Refills: 0
Start: 2022-05-06 | End: 2022-05-12

## 2022-05-05 RX ORDER — INSULIN ASPART 100 [IU]/ML
15 INJECTION, SUSPENSION SUBCUTANEOUS EVERY EVENING
Refills: 0
Start: 2022-05-05 | End: 2022-05-12

## 2022-05-05 RX ORDER — CLOPIDOGREL BISULFATE 75 MG/1
75 TABLET ORAL DAILY
Refills: 0
Start: 2022-05-06 | End: 2022-05-23 | Stop reason: ALTCHOICE

## 2022-05-05 RX ORDER — ACETAMINOPHEN 325 MG/1
650 TABLET ORAL ONCE
Status: COMPLETED | OUTPATIENT
Start: 2022-05-05 | End: 2022-05-05

## 2022-05-05 RX ADMIN — INSULIN ASPART 15 UNITS: 100 INJECTION, SUSPENSION SUBCUTANEOUS at 17:13

## 2022-05-05 RX ADMIN — INSULIN LISPRO 1 UNITS: 100 INJECTION, SOLUTION INTRAVENOUS; SUBCUTANEOUS at 21:29

## 2022-05-05 RX ADMIN — INSULIN LISPRO 1 UNITS: 100 INJECTION, SOLUTION INTRAVENOUS; SUBCUTANEOUS at 12:01

## 2022-05-05 RX ADMIN — INSULIN ASPART 20 UNITS: 100 INJECTION, SUSPENSION SUBCUTANEOUS at 08:32

## 2022-05-05 RX ADMIN — ASPIRIN 81 MG: 81 TABLET, CHEWABLE ORAL at 08:32

## 2022-05-05 RX ADMIN — ATORVASTATIN CALCIUM 40 MG: 40 TABLET, FILM COATED ORAL at 17:13

## 2022-05-05 RX ADMIN — ENOXAPARIN SODIUM 40 MG: 40 INJECTION SUBCUTANEOUS at 08:32

## 2022-05-05 RX ADMIN — INSULIN LISPRO 1 UNITS: 100 INJECTION, SOLUTION INTRAVENOUS; SUBCUTANEOUS at 08:31

## 2022-05-05 RX ADMIN — CLOPIDOGREL BISULFATE 75 MG: 75 TABLET ORAL at 08:32

## 2022-05-05 RX ADMIN — ACETAMINOPHEN 650 MG: 325 TABLET, FILM COATED ORAL at 00:39

## 2022-05-05 NOTE — CASE MANAGEMENT
Case Management Discharge Planning Note    Patient name Abhi Phillips  Location [address] 212/-01 MRN [id number]  : 1930 Date 2022       Current Admission Date: 2022  Current Admission Diagnosis:Stroke-like symptoms   Patient Active Problem List    Diagnosis Date Noted    Hyponatremia 2022    Stroke-like symptoms 2022    Urinary frequency 2022    [facility], [facility] 2020    Syncope 10/25/2019    Pressure injury of left buttock, stage 1 10/25/2019    Late onset Alzheimer's disease without behavioral disturbance ([facility] ) 10/25/2019    Need for immunization against influenza 2018    Type 2 diabetes mellitus without complication, with long-term current use of insulin ([facility] ) 2018    CHF (congestive heart failure) ([facility] ) 2018    Aortic valve disorder 2018      LOS (days): 3  Geometric Mean LOS (GMLOS) (days): 3 00  Days to GMLOS:0 3     OBJECTIVE:  Risk of Unplanned Readmission Score: 15         Current admission status: Inpatient   Preferred Pharmacy:   [facility], [address] [place] [address],[address] [place] [id number]  Phone: [phone] Fax: [phone]    Primary Care Provider: Richard Benton MD    Primary Insurance: MEDICARE  [facility]    DISCHARGE DETAILS:    Other Referral/Resources/Interventions Provided:  Interventions: Short Term Rehab  Referral Comments: CM called Araceli Castano from [facility] at [phone]  Araceli Castano reported that she needs a Covid test and nursing notes overnight to determine if patient has any behaviors  CM asked SLIM for a Covid test and called RN Tico Jordan to ask her to document behaviors overnight  RN agreeable to document

## 2022-05-05 NOTE — PROGRESS NOTES
[facility]  Progress Note - Bety Ramirez 8/30/1930, 80 y o  female MRN: [id number]  Unit/Bed#: -[facility]ora Encounter: [id number]  Primary Care Provider: Adelina Middleton MD   Date and time admitted to hospital: 5/2/2022  8:03 PM    * Stroke-like symptoms  Assessment & Plan  Son reports increased drowsiness, dizziness, inability to walk, and left sided weakness ("she won't move her left foot" and "her left hand is weak") starting Saturday  Due to underlying dementia and agitation ROS or history is unable to be obtained from patient  BP (!) 160/101 (BP Location: Right arm)   Pulse 70   Temp 98 1 °F (36 7 °C) (Oral)   Resp 16   Ht 5' (1 524 m)   Wt 57 2 kg (126 lb)   SpO2 99%   BMI 24 61 kg/m²     · Son reports increased drowsiness, dizziness, inability to walk, and left sided weakness ("she won't move her left foot" and "her left hand is weak") starting Saturday  · Son reports patient was at her normal ambulatory baseline Friday (walker), then noticed complete inability to ambulate Saturday  · ED note mentions right arm weakness, however patient not currently able to cooperate with full physical exam  · Not hypoglycemic; has mild hyponatremia as below without other major electrolyte disturbance     · CTA without evidence for acute infarct, bleed, or mass effect, however is showing occlusion vs severe stenosis of the right posterior cerebral artery and 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  · Discontinue Tele monitoring right now, permissive HTN and neuro checks per stroke protocol, daily ASA and atorvastatin  · Neurology consulted and their input is as follows    Per Dr Tami Pleitez from Neurology    Initial CT scan of the head was unremarkable, CTA of the head and neck showed severe stenosis/occlusion of the right PCA, with 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, her labs revealed significant hyperlipidemia, and on examination patient had subtle left-sided weakness with neglect and field cut to suspect a right PCA infarct  Patient admitted on stroke pathway, MRI of the brain done subsequently confirms the presence of a right PCA infarct of recent duration, patient started on dual anti-platelet therapy,/statin therapy will recommend adequate blood pressure and, blood sugar control and patient may need inpatient rehab  At baseline she also suffers from dementia late onset Alzheimer's type and hence recommend delirium precautions  Hyponatremia  Assessment & Plan  · Sodium 134- now stable at 138  · Will give continuous gentle IV NS to support permissive HTN overnight  · Periodically monitoring of BMP    Results from last 7 days   Lab Units 05/04/22  0448 05/02/22  1853   SODIUM mmol/L 138 134*   POTASSIUM mmol/L 4 4 3 7   CHLORIDE mmol/L 104 99*   CO2 mmol/L 25 25   ANION GAP mmol/L 9 10   BUN mg/dL 17 20   CREATININE mg/dL 0 87 0 99   CALCIUM mg/dL 8 7 8 8   ALBUMIN g/dL  --  3 2*   TOTAL BILIRUBIN mg/dL  --  0 46   ALK PHOS U/L  --  132*   ALT U/L  --  24   AST U/L  --  26   EGFR ml/min/1 73sq m 58 49   GLUCOSE RANDOM mg/dL 182* 152*         Urinary frequency  Assessment & Plan  · ED note reports increasing urinary frequency since Friday without fever  · Son feels this increased frequency was actually agitation with patient repeatedly trying to get up and walk around at home  · Due to dementia as above patient is unable to provide detailed history  · No evidence of any urine infection          Late onset Alzheimer's disease without behavioral disturbance ([facility] [address] )  Assessment & Plan  · Patient currently confused, not able to answer questions properly but compared to admission the agitation has significantly decreased  · Son reports patient is at mental baseline  · Will give one-time dose IM zyprexa and utilize soft restraint belt to avoid fall at this time  · Son is looking for short term rehab or nursing home placement due to difficulty taking care of the patient at home  · Delirium precautions  · Case management consulted, as patient will likely need placement  · No need of any restraints or one-to-one observation  CHF (congestive heart failure) ([facility])  Assessment & Plan  Wt Readings from Last 3 Encounters:   05/03/22 57 2 kg (126 lb)   11/21/20 57 6 kg (127 lb)   05/31/20 61 9 kg (136 lb 7 4 oz)     · History of congestive heart failure noted in the patient's chart  · No previous ECHO results able to be found  · Has elevated   · No LE edema appreciated on physical exam  · Is not prescribed outpatient diuretic or HTN medication  · Has weakness/stroke-like symptoms as above  · Does not appear to be in overload  No need of intravenous diuresis  · Continue monitoring and consider IV diuresis if s/s of volume overload become apparent  Type 2 diabetes mellitus without complication, with long-term current use of insulin ([facility])  Assessment & Plan  Lab Results   Component Value Date    HGBA1C 8 2 (H) 05/03/2022     · Blood glucose 152  · Will order home humalog 75/25 as hospitals 70/30 formulation; will order 30U qAM and 30U qPM to avoid hypoglycemia overnight in the setting of stroke protocol (increase as appropriate)  · Correctional SSI  · Hypoglycemia protocol; diabetic diet ordered  TE Pharmacologic Prophylaxis: Enoxaparin (Lovenox)    Patient Centered Rounds: I have performed bedside rounds with nursing staff today    Discussions with Specialists or Other Care Team Provider:  Care team  Education and Discussions with Family / Patient:  Patient    Current Length of Stay: 3 day(s)  Current Patient Status: Inpatient     Certification Statement: The patient will continue to require additional inpatient hospital stay due to Stroke-like symptoms  Discharge Plan / Estimated Discharge Date:  Tomorrow to [facility]    Code Status: Level 3 - DNAR and DNI  ______________________________________________________________________________    Subjective: Patient seen and examined by me  No chest pain, palpitations or diaphoresis at this point of time  Patient does have weakness of the weakness is generalized  Overnight the patient did have some confusion and was given 1 dose of Zyprexa  No fever or chills right now    Objective:   Vitals: Blood pressure (!) 160/101, pulse 70, temperature 98 1 °F (36 7 °C), temperature source Oral, resp  rate 16, height 5' (1 524 m), weight 57 2 kg (126 lb), SpO2 99 %      Physical Exam:   General appearance: alert, appears stated age and cooperative  Constitutional- looks a little weak  HEENT - atraumatic and normocephalic  Neck- supple  Skin - no fresh rash  Extremities no fresh focal deformities  Cardiovascular- S1-S2 heard  Respiratory- bilateral air entry present, no crackles or rhonchi  Skin - no fresh rash  Abdomen - normal bowel sounds present, no rebound tenderness  CNS- No fresh focal deficits, has dementia  Psych- no acute psychosis but does have confusion    Additional Data:   Labs:  Results from last 7 days   Lab Units 05/04/22  0448 05/02/22  1853   WBC Thousand/uL 9 28 10 81*   HEMOGLOBIN g/dL 12 8 13 1   HEMATOCRIT % 39 9 38 6   MCV fL 90 85   PLATELETS Thousands/uL 178 206     Results from last 7 days   Lab Units 05/04/22  0448 05/02/22  1853   SODIUM mmol/L 138 134*   POTASSIUM mmol/L 4 4 3 7   CHLORIDE mmol/L 104 99*   CO2 mmol/L 25 25   ANION GAP mmol/L 9 10   BUN mg/dL 17 20   CREATININE mg/dL 0 87 0 99   CALCIUM mg/dL 8 7 8 8   ALBUMIN g/dL  --  3 2*   TOTAL BILIRUBIN mg/dL  --  0 46   ALK PHOS U/L  --  132*   ALT U/L  --  24   AST U/L  --  26   EGFR ml/min/1 73sq m 58 49   GLUCOSE RANDOM mg/dL 182* 152*             Results from last 7 days   Lab Units 05/02/22  1853   NT-PRO BNP pg/mL 982*          Results from last 7 days   Lab Units 05/05/22  1107 05/05/22  0702 05/04/22  2036 05/04/22  1546 05/04/22  1218 05/04/22  6909 05/03/22  2153 05/03/22  1703 05/03/22  1553 05/03/22  0741 05/03/22  0116   POC GLUCOSE mg/dl 151* 154* 146* 122 115 233* 259* 155* 41* 227* 154*     Results from last 7 days   Lab Units 05/03/22  0544   HEMOGLOBIN A1C % 8 2*         * I Have Reviewed All Lab Data Listed Above  Cultures:               Imaging:  Imaging Reports Reviewed Today Include:   CTA head with and without contrast    Result Date: 5/2/2022  Impression: 1  No evidence of acute vascular territorial infarction, intracranial hemorrhage or mass effect  2   Occlusion versus severe stenosis of the right posterior cerebral artery distal to the P1 segment, of uncertain chronicity  3   Multifocal moderate to severe stenoses in the carotid siphons  Workstation performed: [id number]     XR chest 1 view portable    Result Date: 5/3/2022  Impression: No acute cardiopulmonary disease  Workstation performed: [id number]     MRI brain wo contrast    Result Date: 5/3/2022  Impression: Recent infarcts are identified in the right posterior cerebral artery which diffusion to include the medial temporal lobe/hippocampus, occipital lobe, and right centrum semiovale  Chronic lacunar infarcts bilateral cerebellum  Workstation performed: [id number]     XR follow up    Result Date: 5/3/2022  Impression: No radiopaque foreign bodies identified within the abdomen, pelvis or proximal thighs   Workstation performed: [id number]     Scheduled Meds:  Current Facility-Administered Medications   Medication Dose Route Frequency Provider Last Rate    aspirin  81 mg Oral Daily Dewanda Been [place], [place]      atorvastatin  40 mg Oral QPM Dewanda Been [place], [place]      clopidogrel  75 mg Oral Daily BRIAN Rosen      enoxaparin  40 mg Subcutaneous Daily Mick Greco      insulin aspart protamine-insulin aspart  15 Units Subcutaneous QPM James Lancaster MD      insulin aspart protamine-insulin aspart  20 Units Subcutaneous QAM James Lancaster MD      insulin lispro  1-5 Units Subcutaneous TID AC Henry Osuna PA-C      insulin lispro 1-5 Units Subcutaneous HS Ladona Loose, [place]      OLANZapine  5 mg Oral HS PRN Leanne Pickens, MD Leanne Pickens, MD [facility]    ** Please Note: This note has been constructed using a voice recognition system   **

## 2022-05-05 NOTE — PLAN OF CARE
Problem: MOBILITY - ADULT  Goal: Maintain or return to baseline ADL function  Description: INTERVENTIONS:  -  Assess patient's ability to carry out ADLs; assess patient's baseline for ADL function and identify physical deficits which impact ability to perform ADLs (bathing, care of mouth/teeth, toileting, grooming, dressing, etc )  - Assess/evaluate cause of self-care deficits   - Assess range of motion  - Assess patient's mobility; develop plan if impaired  - Assess patient's need for assistive devices and provide as appropriate  - Encourage maximum independence but intervene and supervise when necessary  - Involve family in performance of ADLs  - Assess for home care needs following discharge   - Consider OT consult to assist with ADL evaluation and planning for discharge  - Provide patient education as appropriate  Outcome: Progressing  Goal: Maintains/Returns to pre admission functional level  Description: INTERVENTIONS:  - Perform BMAT or MOVE assessment daily    - Set and communicate daily mobility goal to care team and patient/family/caregiver  - Collaborate with rehabilitation services on mobility goals if consulted  - Perform Range of Motion 3 times a day  - Reposition patient every 2 hours    - Dangle patient 3 times a day  - Stand patient 3 times a day  - Ambulate patient 3 times a day  - Out of bed to chair 3 times a day   - Out of bed for meals 3 times a day  - Out of bed for toileting  - Record patient progress and toleration of activity level   Outcome: Progressing     Problem: Potential for Falls  Goal: Patient will remain free of falls  Description: INTERVENTIONS:  - Educate patient/family on patient safety including physical limitations  - Instruct patient to call for assistance with activity   - Consult OT/PT to assist with strengthening/mobility   - Keep Call bell within reach  - Keep bed low and locked with side rails adjusted as appropriate  - Keep care items and personal belongings within reach  - Initiate and maintain comfort rounds  - Make Fall Risk Sign visible to staff  - Offer Toileting every 2 Hours, in advance of need  - Initiate/Maintain bed alarm  - Obtain necessary fall risk management equipment: call bell within reach  - Apply yellow socks and bracelet for high fall risk patients  - Consider moving patient to room near nurses station  Outcome: Progressing     Problem: Prexisting or High Potential for Compromised Skin Integrity  Goal: Skin integrity is maintained or improved  Description: INTERVENTIONS:  - Identify patients at risk for skin breakdown  - Assess and monitor skin integrity  - Assess and monitor nutrition and hydration status  - Monitor labs   - Assess for incontinence   - Turn and reposition patient  - Assist with mobility/ambulation  - Relieve pressure over bony prominences  - Avoid friction and shearing  - Provide appropriate hygiene as needed including keeping skin clean and dry  - Evaluate need for skin moisturizer/barrier cream  - Collaborate with interdisciplinary team   - Patient/family teaching  - Consider wound care consult   Outcome: Progressing     Problem: Nutrition/Hydration-ADULT  Goal: Nutrient/Hydration intake appropriate for improving, restoring or maintaining nutritional needs  Description: Monitor and assess patient's nutrition/hydration status for malnutrition  Collaborate with interdisciplinary team and initiate plan and interventions as ordered  Monitor patient's weight and dietary intake as ordered or per policy  Utilize nutrition screening tool and intervene as necessary  Determine patient's food preferences and provide high-protein, high-caloric foods as appropriate       INTERVENTIONS:  - Monitor oral intake, urinary output, labs, and treatment plans  - Assess nutrition and hydration status and recommend course of action  - Evaluate amount of meals eaten  - Assist patient with eating if necessary   - Allow adequate time for meals  - Recommend/ encourage appropriate diets, oral nutritional supplements, and vitamin/mineral supplements  - Order, calculate, and assess calorie counts as needed  - Assess need for intravenous fluids  - Provide nutrition/hydration education as appropriate  - Include patient/family/caregiver in decisions related to nutrition  Outcome: Progressing

## 2022-05-05 NOTE — QUICK NOTE
Tried to call son Josefina Reynaga at number [phone]- but got a message that the number is no longer in service    Patient stable for discharge to SNF   , Clarita Gu informed about the same

## 2022-05-05 NOTE — QUICK NOTE
Page by bedside RN, patient is confused and is continually attempting to get out of bed  Ordered 1 time dose of Zyprexa

## 2022-05-05 NOTE — ASSESSMENT & PLAN NOTE
· Sodium 134- now stable at 138  · Will give continuous gentle IV NS to support permissive HTN overnight  · Periodically monitoring of BMP    Results from last 7 days   Lab Units 05/04/22  0448 05/02/22  1853   SODIUM mmol/L 138 134*   POTASSIUM mmol/L 4 4 3 7   CHLORIDE mmol/L 104 99*   CO2 mmol/L 25 25   ANION GAP mmol/L 9 10   BUN mg/dL 17 20   CREATININE mg/dL 0 87 0 99   CALCIUM mg/dL 8 7 8 8   ALBUMIN g/dL  --  3 2*   TOTAL BILIRUBIN mg/dL  --  0 46   ALK PHOS U/L  --  132*   ALT U/L  --  24   AST U/L  --  26   EGFR ml/min/1 73sq m 58 49   GLUCOSE RANDOM mg/dL 182* 152*

## 2022-05-05 NOTE — PROGRESS NOTES
Patient alert and oriented x1  Patient confused  Patient slept on and off during shift  No signs of pain  One time dose Zyprexa administered  Patient re-directed and re-oriented during shift  Multiple attempts to get out of bed  Patient tolerated medication and prn tylenol given for headache  No deviation in assessment finding noted when compared to previous documentation  Will continue to monitor

## 2022-05-05 NOTE — PLAN OF CARE
Problem: MOBILITY - ADULT  Goal: Maintain or return to baseline ADL function  Description: INTERVENTIONS:  -  Assess patient's ability to carry out ADLs; assess patient's baseline for ADL function and identify physical deficits which impact ability to perform ADLs (bathing, care of mouth/teeth, toileting, grooming, dressing, etc )  - Assess/evaluate cause of self-care deficits   - Assess range of motion  - Assess patient's mobility; develop plan if impaired  - Assess patient's need for assistive devices and provide as appropriate  - Encourage maximum independence but intervene and supervise when necessary  - Involve family in performance of ADLs  - Assess for home care needs following discharge   - Consider OT consult to assist with ADL evaluation and planning for discharge  - Provide patient education as appropriate  Outcome: Progressing  Goal: Maintains/Returns to pre admission functional level  Description: INTERVENTIONS:  - Perform BMAT or MOVE assessment daily    - Set and communicate daily mobility goal to care team and patient/family/caregiver  - Collaborate with rehabilitation services on mobility goals if consulted  - Perform Range of Motion 3 times a day  - Reposition patient every 2 hours    - Dangle patient 3 times a day  - Stand patient 3 times a day  - Ambulate patient 3 times a day  - Out of bed to chair 3 times a day   - Out of bed for meals 3 times a day  - Out of bed for toileting  - Record patient progress and toleration of activity level   Outcome: Progressing     Problem: Potential for Falls  Goal: Patient will remain free of falls  Description: INTERVENTIONS:  - Educate patient/family on patient safety including physical limitations  - Instruct patient to call for assistance with activity   - Consult OT/PT to assist with strengthening/mobility   - Keep Call bell within reach  - Keep bed low and locked with side rails adjusted as appropriate  - Keep care items and personal belongings within reach  - Initiate and maintain comfort rounds  - Make Fall Risk Sign visible to staff  - Offer Toileting every 2 Hours, in advance of need  - Initiate/Maintain bed alarm  - Obtain necessary fall risk management equipment:   - Apply yellow socks and bracelet for high fall risk patients  - Consider moving patient to room near nurses station  Outcome: Progressing     Problem: Prexisting or High Potential for Compromised Skin Integrity  Goal: Skin integrity is maintained or improved  Description: INTERVENTIONS:  - Identify patients at risk for skin breakdown  - Assess and monitor skin integrity  - Assess and monitor nutrition and hydration status  - Monitor labs   - Assess for incontinence   - Turn and reposition patient  - Assist with mobility/ambulation  - Relieve pressure over bony prominences  - Avoid friction and shearing  - Provide appropriate hygiene as needed including keeping skin clean and dry  - Evaluate need for skin moisturizer/barrier cream  - Collaborate with interdisciplinary team   - Patient/family teaching  - Consider wound care consult   Outcome: Progressing     Problem: Nutrition/Hydration-ADULT  Goal: Nutrient/Hydration intake appropriate for improving, restoring or maintaining nutritional needs  Description: Monitor and assess patient's nutrition/hydration status for malnutrition  Collaborate with interdisciplinary team and initiate plan and interventions as ordered  Monitor patient's weight and dietary intake as ordered or per policy  Utilize nutrition screening tool and intervene as necessary  Determine patient's food preferences and provide high-protein, high-caloric foods as appropriate       INTERVENTIONS:  - Monitor oral intake, urinary output, labs, and treatment plans  - Assess nutrition and hydration status and recommend course of action  - Evaluate amount of meals eaten  - Assist patient with eating if necessary   - Allow adequate time for meals  - Recommend/ encourage appropriate diets, oral nutritional supplements, and vitamin/mineral supplements  - Order, calculate, and assess calorie counts as needed  - Assess need for intravenous fluids  - Provide nutrition/hydration education as appropriate  - Include patient/family/caregiver in decisions related to nutrition  Outcome: Progressing     Problem: METABOLIC, FLUID AND ELECTROLYTES - ADULT  Goal: Electrolytes maintained within normal limits  Description: INTERVENTIONS:  - Monitor labs and assess patient for signs and symptoms of electrolyte imbalances  - Administer electrolyte replacement as ordered  - Monitor response to electrolyte replacements, including repeat lab results as appropriate  - Instruct patient on fluid and nutrition as appropriate  Outcome: Progressing  Goal: Fluid balance maintained  Description: INTERVENTIONS:  - Monitor labs   - Monitor I/O and WT  - Instruct patient on fluid and nutrition as appropriate  - Assess for signs & symptoms of volume excess or deficit  Outcome: Progressing  Goal: Glucose maintained within target range  Description: INTERVENTIONS:  - Monitor Blood Glucose as ordered  - Assess for signs and symptoms of hyperglycemia and hypoglycemia  - Administer ordered medications to maintain glucose within target range  - Assess nutritional intake and initiate nutrition service referral as needed  Outcome: Progressing     Problem: SKIN/TISSUE INTEGRITY - ADULT  Goal: Skin Integrity remains intact(Skin Breakdown Prevention)  Description: Assess:  -Perform Jorge Luis assessment every   -Clean and moisturize skin every   -Inspect skin when repositioning, toileting, and assisting with ADLS  -Assess under medical devices such as  every   -Assess extremities for adequate circulation and sensation     Bed Management:  -Have minimal linens on bed & keep smooth, unwrinkled  -Change linens as needed when moist or perspiring  -Avoid sitting or lying in one position for more than  hours while in bed  -Keep HOB at degrees Toileting:  -Offer bedside commode  -Assess for incontinence every   -Use incontinent care products after each incontinent episode such as     Activity:  -Mobilize patient  times a day  -Encourage activity and walks on unit  -Encourage or provide ROM exercises   -Turn and reposition patient every  Hours  -Use appropriate equipment to lift or move patient in bed  -Instruct/ Assist with weight shifting every  when out of bed in chair  -Consider limitation of chair time hour intervals    Skin Care:  -Avoid use of baby powder, tape, friction and shearing, hot water or constrictive clothing  -Relieve pressure over bony prominences using   -Do not massage red bony areas    Next Steps:  -Teach patient strategies to minimize risks such as   -Consider consults to  interdisciplinary teams such as   Outcome: Progressing  Goal: Incision(s), wounds(s) or drain site(s) healing without S/S of infection  Description: INTERVENTIONS  - Assess and document dressing, incision, wound bed, drain sites and surrounding tissue  - Provide patient and family education  - Perform skin care/dressing changes every   Outcome: Progressing  Goal: Pressure injury heals and does not worsen  Description: Interventions:  - Implement low air loss mattress or specialty surface (Criteria met)  - Apply silicone foam dressing  - Instruct/assist with weight shifting every  minutes when in chair   - Limit chair time to  hour intervals  - Use special pressure reducing interventions such as  when in chair   - Apply fecal or urinary incontinence containment device   - Perform passive or active ROM every   - Turn and reposition patient & offload bony prominences every  hours   - Utilize friction reducing device or surface for transfers   - Consider consults to  interdisciplinary teams such as   - Use incontinent care products after each incontinent episode such as   - Consider nutrition services referral as needed  Outcome: Progressing     Problem: MUSCULOSKELETAL - ADULT  Goal: Maintain or return mobility to safest level of function  Description: INTERVENTIONS:  - Assess patient's ability to carry out ADLs; assess patient's baseline for ADL function and identify physical deficits which impact ability to perform ADLs (bathing, care of mouth/teeth, toileting, grooming, dressing, etc )  - Assess/evaluate cause of self-care deficits   - Assess range of motion  - Assess patient's mobility  - Assess patient's need for assistive devices and provide as appropriate  - Encourage maximum independence but intervene and supervise when necessary  - Involve family in performance of ADLs  - Assess for home care needs following discharge   - Consider OT consult to assist with ADL evaluation and planning for discharge  - Provide patient education as appropriate  Outcome: Progressing  Goal: Maintain proper alignment of affected body part  Description: INTERVENTIONS:  - Support, maintain and protect limb and body alignment  - Provide patient/ family with appropriate education  Outcome: Progressing

## 2022-05-05 NOTE — PROGRESS NOTES
Patient alert and oriented x 1, confused and attempting multiple times to get out of bed  Patient able to be redirected but very forgetful

## 2022-05-05 NOTE — QUICK NOTE
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  MRI brain: recent infarcts in the right PCA including the medial temporal lobe/hippocampus, occipital lobe, and right centrum semiovale  A1C 8 2  Lipid panel with   Echo: EF 60%  No regional wall motion abnormalities  Bilateral atrium size normal      She was started on DAPT with aspirin 81 mg and Plavix 75 mg  Plan is for DAPT x 21 days, followed by aspirin monotherapy    I was asked by the medicine team to see Ms Rasheed Javier today for question of change in mental status  Per chart review, it appears that the patient remains confused, did not sleep well last night and made multiple attempts to get out of bed  She was given 5mg IM zyprexa  Exam is limited by dementia/poor effort, however appears unchanged from previously documented exam  She is awake and alert  She is oriented to self and place  She was unable to tell me the month or year  Her speech was clear  She was able to read, repeat, and identify 5/6 items  Face appears symmetric  Conjugate gaze  PERRL, EOMI  Left visual field cut  LUE/LLE motor strength 4/5 with slight drift  Ataxic on left  Pt reports intact sensation  79y/o female with dementia and likely hospital related delirium  Recommend delirium precautions   CTH if focal deficit or change in neuro exam

## 2022-05-05 NOTE — ASSESSMENT & PLAN NOTE
Son reports increased drowsiness, dizziness, inability to walk, and left sided weakness ("she won't move her left foot" and "her left hand is weak") starting Saturday  Due to underlying dementia and agitation ROS or history is unable to be obtained from patient  BP (!) 160/101 (BP Location: Right arm)   Pulse 70   Temp 98 1 °F (36 7 °C) (Oral)   Resp 16   Ht 5' (1 524 m)   Wt 57 2 kg (126 lb)   SpO2 99%   BMI 24 61 kg/m²     · Son reports increased drowsiness, dizziness, inability to walk, and left sided weakness ("she won't move her left foot" and "her left hand is weak") starting Saturday  · Son reports patient was at her normal ambulatory baseline Friday (walker), then noticed complete inability to ambulate Saturday  · ED note mentions right arm weakness, however patient not currently able to cooperate with full physical exam  · Not hypoglycemic; has mild hyponatremia as below without other major electrolyte disturbance  · CTA without evidence for acute infarct, bleed, or mass effect, however is showing occlusion vs severe stenosis of the right posterior cerebral artery and 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  · Discontinue Tele monitoring right now, permissive HTN and neuro checks per stroke protocol, daily ASA and atorvastatin  · Neurology consulted and their input is as follows    Per Dr César Rahman from Neurology    Initial CT scan of the head was unremarkable, CTA of the head and neck showed severe stenosis/occlusion of the right PCA, with 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

## 2022-05-05 NOTE — CASE MANAGEMENT
Case Management Discharge Planning Note    Patient name Chetna Amado  Location [address] [id number] MRN [id number]  : 1930 Date 2022       Current Admission Date: 2022  Current Admission Diagnosis:Stroke-like symptoms   Patient Active Problem List    Diagnosis Date Noted    Hyponatremia 2022    Stroke-like symptoms 2022    Urinary frequency 2022    Pressure injury of left heel, unstageable ([facility] ) 2020    Syncope 10/25/2019    Pressure injury of left buttock, stage 1 10/25/2019    Late onset Alzheimer's disease without behavioral disturbance ([place] [address] ) 10/25/2019    Need for immunization against influenza 2018    Type 2 diabetes mellitus without complication, with long-term current use of insulin ([place] [address] ) 2018    CHF (congestive heart failure) ([place] [address] ) 2018    Aortic valve disorder 2018      LOS (days): 3  Geometric Mean LOS (GMLOS) (days): 3 00  Days to GMLOS:0 5     OBJECTIVE:  Risk of Unplanned Readmission Score: 12         Current admission status: Inpatient   Preferred Pharmacy:   [facility] # [address] [place] [id number]  Phone: [phone] Fax: [phone]    Primary Care Provider: Dl Lewis MD    Primary Insurance: MEDICARE  Secondary Insurance: [facility]    DISCHARGE DETAILS    Other Referral/Resources/Interventions Provided:  Referral Comments: Andrew Hansen at [address] [place] [address] called and asked if she was ready to receive  She asked for updated clinicals and they were faxed to her

## 2022-05-06 PROBLEM — I50.32 CHRONIC DIASTOLIC CONGESTIVE HEART FAILURE (HCC): Status: ACTIVE | Noted: 2018-05-12

## 2022-05-06 PROBLEM — I63.531 CEREBROVASCULAR ACCIDENT (CVA) DUE TO STENOSIS OF RIGHT POSTERIOR CEREBRAL ARTERY (HCC): Status: ACTIVE | Noted: 2022-05-02

## 2022-05-06 LAB
GLUCOSE SERPL-MCNC: 146 MG/DL (ref 65–140)
GLUCOSE SERPL-MCNC: 159 MG/DL (ref 65–140)
GLUCOSE SERPL-MCNC: 181 MG/DL (ref 65–140)
GLUCOSE SERPL-MCNC: 194 MG/DL (ref 65–140)

## 2022-05-06 PROCEDURE — 82948 REAGENT STRIP/BLOOD GLUCOSE: CPT

## 2022-05-06 PROCEDURE — 92526 ORAL FUNCTION THERAPY: CPT

## 2022-05-06 PROCEDURE — 99232 SBSQ HOSP IP/OBS MODERATE 35: CPT | Performed by: INTERNAL MEDICINE

## 2022-05-06 RX ADMIN — ENOXAPARIN SODIUM 40 MG: 40 INJECTION SUBCUTANEOUS at 08:35

## 2022-05-06 RX ADMIN — INSULIN ASPART 20 UNITS: 100 INJECTION, SUSPENSION SUBCUTANEOUS at 08:36

## 2022-05-06 RX ADMIN — CLOPIDOGREL BISULFATE 75 MG: 75 TABLET ORAL at 08:35

## 2022-05-06 RX ADMIN — INSULIN LISPRO 1 UNITS: 100 INJECTION, SOLUTION INTRAVENOUS; SUBCUTANEOUS at 08:36

## 2022-05-06 RX ADMIN — INSULIN ASPART 15 UNITS: 100 INJECTION, SUSPENSION SUBCUTANEOUS at 17:33

## 2022-05-06 RX ADMIN — ATORVASTATIN CALCIUM 40 MG: 40 TABLET, FILM COATED ORAL at 17:33

## 2022-05-06 RX ADMIN — INSULIN LISPRO 1 UNITS: 100 INJECTION, SOLUTION INTRAVENOUS; SUBCUTANEOUS at 11:53

## 2022-05-06 RX ADMIN — ASPIRIN 81 MG: 81 TABLET, CHEWABLE ORAL at 08:35

## 2022-05-06 RX ADMIN — INSULIN LISPRO 1 UNITS: 100 INJECTION, SOLUTION INTRAVENOUS; SUBCUTANEOUS at 21:15

## 2022-05-06 NOTE — PLAN OF CARE
Problem: MOBILITY - ADULT  Goal: Maintain or return to baseline ADL function  Description: INTERVENTIONS:  -  Assess patient's ability to carry out ADLs; assess patient's baseline for ADL function and identify physical deficits which impact ability to perform ADLs (bathing, care of mouth/teeth, toileting, grooming, dressing, etc )  - Assess/evaluate cause of self-care deficits   - Assess range of motion  - Assess patient's mobility; develop plan if impaired  - Assess patient's need for assistive devices and provide as appropriate  - Encourage maximum independence but intervene and supervise when necessary  - Involve family in performance of ADLs  - Assess for home care needs following discharge   - Consider OT consult to assist with ADL evaluation and planning for discharge  - Provide patient education as appropriate  Outcome: Progressing  Goal: Maintains/Returns to pre admission functional level  Description: INTERVENTIONS:  - Perform BMAT or MOVE assessment daily    - Set and communicate daily mobility goal to care team and patient/family/caregiver  - Collaborate with rehabilitation services on mobility goals if consulted  - Perform Range of Motion 3 times a day  - Reposition patient every 2 hours    - Dangle patient 3 times a day  - Stand patient 3 times a day  - Ambulate patient 3 times a day  - Out of bed to chair 3 times a day   - Out of bed for meals 3 times a day  - Out of bed for toileting  - Record patient progress and toleration of activity level   Outcome: Progressing     Problem: Potential for Falls  Goal: Patient will remain free of falls  Description: INTERVENTIONS:  - Educate patient/family on patient safety including physical limitations  - Instruct patient to call for assistance with activity   - Consult OT/PT to assist with strengthening/mobility   - Keep Call bell within reach  - Keep bed low and locked with side rails adjusted as appropriate  - Keep care items and personal belongings within reach  - Initiate and maintain comfort rounds  - Make Fall Risk Sign visible to staff  - Offer Toileting every 2 Hours, in advance of need  - Initiate/Maintain bed alarm  - Obtain necessary fall risk management equipment: call bell within reach  - Apply yellow socks and bracelet for high fall risk patients  - Consider moving patient to room near nurses station  Outcome: Progressing     Problem: Prexisting or High Potential for Compromised Skin Integrity  Goal: Skin integrity is maintained or improved  Description: INTERVENTIONS:  - Identify patients at risk for skin breakdown  - Assess and monitor skin integrity  - Assess and monitor nutrition and hydration status  - Monitor labs   - Assess for incontinence   - Turn and reposition patient  - Assist with mobility/ambulation  - Relieve pressure over bony prominences  - Avoid friction and shearing  - Provide appropriate hygiene as needed including keeping skin clean and dry  - Evaluate need for skin moisturizer/barrier cream  - Collaborate with interdisciplinary team   - Patient/family teaching  - Consider wound care consult   Outcome: Progressing     Problem: Nutrition/Hydration-ADULT  Goal: Nutrient/Hydration intake appropriate for improving, restoring or maintaining nutritional needs  Description: Monitor and assess patient's nutrition/hydration status for malnutrition  Collaborate with interdisciplinary team and initiate plan and interventions as ordered  Monitor patient's weight and dietary intake as ordered or per policy  Utilize nutrition screening tool and intervene as necessary  Determine patient's food preferences and provide high-protein, high-caloric foods as appropriate       INTERVENTIONS:  - Monitor oral intake, urinary output, labs, and treatment plans  - Assess nutrition and hydration status and recommend course of action  - Evaluate amount of meals eaten  - Assist patient with eating if necessary   - Allow adequate time for meals  - Recommend/ encourage appropriate diets, oral nutritional supplements, and vitamin/mineral supplements  - Order, calculate, and assess calorie counts as needed  - Assess need for intravenous fluids  - Provide nutrition/hydration education as appropriate  - Include patient/family/caregiver in decisions related to nutrition  Outcome: Progressing     Problem: METABOLIC, FLUID AND ELECTROLYTES - ADULT  Goal: Electrolytes maintained within normal limits  Description: INTERVENTIONS:  - Monitor labs and assess patient for signs and symptoms of electrolyte imbalances  - Administer electrolyte replacement as ordered  - Monitor response to electrolyte replacements, including repeat lab results as appropriate  - Instruct patient on fluid and nutrition as appropriate  Outcome: Progressing  Goal: Fluid balance maintained  Description: INTERVENTIONS:  - Monitor labs   - Monitor I/O and WT  - Instruct patient on fluid and nutrition as appropriate  - Assess for signs & symptoms of volume excess or deficit  Outcome: Progressing  Goal: Glucose maintained within target range  Description: INTERVENTIONS:  - Monitor Blood Glucose as ordered  - Assess for signs and symptoms of hyperglycemia and hypoglycemia  - Administer ordered medications to maintain glucose within target range  - Assess nutritional intake and initiate nutrition service referral as needed  Outcome: Progressing     Problem: SKIN/TISSUE INTEGRITY - ADULT  Goal: Skin Integrity remains intact(Skin Breakdown Prevention)  Description: Assess:  -Perform Jorge Luis assessment every shift  -Clean and moisturize skin every shift  -Inspect skin when repositioning, toileting, and assisting with ADLS  -Assess extremities for adequate circulation and sensation     Bed Management:  -Have minimal linens on bed & keep smooth, unwrinkled  -Change linens as needed when moist or perspiring  -Avoid sitting or lying in one position for more than 2 hours while in bed  -Keep HOB at 3 degrees Toileting:  -Offer bedside commode  -Assess for incontinence every shift  -Use incontinent care products after each incontinent episode such as barrier cream    Activity:  -Mobilize patient 3 times a day  -Encourage activity and walks on unit  -Encourage or provide ROM exercises   -Turn and reposition patient every 2 Hours  -Use appropriate equipment to lift or move patient in bed  -Instruct/ Assist with weight shifting every 2 hours when out of bed in chair  Skin Care:  -Avoid use of baby powder, tape, friction and shearing, hot water or constrictive clothing  -Relieve pressure over bony prominences using pillows  -Do not massage red bony areas      Outcome: Progressing  Goal: Incision(s), wounds(s) or drain site(s) healing without S/S of infection  Description: INTERVENTIONS  - Assess and document dressing, incision, wound bed, drain sites and surrounding tissue  - Provide patient and family education  - Perform skin care/dressing changes every shift  Outcome: Progressing  Goal: Pressure injury heals and does not worsen  Description: Interventions:  - Implement low air loss mattress or specialty surface (Criteria met)  - Apply silicone foam dressing  - Instruct/assist with weight shifting every 120 minutes when in chair   - Limit chair time to 2 hour intervals  - Turn and reposition patient & offload bony prominences every 2 hours   - Utilize friction reducing device or surface for transfers     - Consider nutrition services referral as needed  Outcome: Progressing     Problem: MUSCULOSKELETAL - ADULT  Goal: Maintain or return mobility to safest level of function  Description: INTERVENTIONS:  - Assess patient's ability to carry out ADLs; assess patient's baseline for ADL function and identify physical deficits which impact ability to perform ADLs (bathing, care of mouth/teeth, toileting, grooming, dressing, etc )  - Assess/evaluate cause of self-care deficits   - Assess range of motion  - Assess patient's mobility  - Assess patient's need for assistive devices and provide as appropriate  - Encourage maximum independence but intervene and supervise when necessary  - Involve family in performance of ADLs  - Assess for home care needs following discharge   - Consider OT consult to assist with ADL evaluation and planning for discharge  - Provide patient education as appropriate  Outcome: Progressing  Goal: Maintain proper alignment of affected body part  Description: INTERVENTIONS:  - Support, maintain and protect limb and body alignment  - Provide patient/ family with appropriate education  Outcome: Progressing

## 2022-05-06 NOTE — ASSESSMENT & PLAN NOTE
Son reports increased drowsiness, dizziness, inability to walk, and left sided weakness ("she won't move her left foot" and "her left hand is weak") starting Saturday  Due to underlying dementia and agitation ROS or history is unable to be obtained from patient  Son reports patient was at her normal ambulatory baseline Friday (walker), then noticed complete inability to ambulate Saturday  · ED note mentions right arm weakness, however patient not currently able to cooperate with full physical exam  · Not hypoglycemic; has mild hyponatremia as below without other major electrolyte disturbance  · CTA without evidence for acute infarct, bleed, or mass effect, however is showing occlusion vs severe stenosis of the right posterior cerebral artery and 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  · Status post Discontinue Tele monitoring right now, permissive HTN and neuro checks per stroke protocol, daily ASA and atorvastatin  · Neurology consulted and their input is as follows    Per Dr Warner Cesar from Neurology    · Initial CT scan of the head was unremarkable, CTA of the head and neck showed severe stenosis/occlusion of the right PCA, with 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, her labs revealed significant hyperlipidemia, and on examination patient had subtle left-sided weakness with neglect and field cut to suspect a right PCA infarct  Patient admitted on stroke pathway, MRI of the brain done subsequently confirms the presence of a right PCA infarct of recent duration, patient started on dual anti-platelet therapy,/statin therapy will recommend adequate blood pressure and, blood sugar control and patient may need inpatient rehab  At baseline she also suffers from dementia late onset Alzheimer's type and hence recommend delirium precautions     · As per neurologist, patient will be on dual anti-platelet therapy with aspirin 81 mg once a day as well as Plavix 75 mg once a day for 21 days, followed by aspirin monotherapy  Continue atorvastatin  Goal is now normotension  Avoid hypotension  · Presently waiting for rehabilitation facility placement, for further PT/OT

## 2022-05-06 NOTE — ASSESSMENT & PLAN NOTE
Wt Readings from Last 3 Encounters:   05/03/22 57 2 kg (126 lb)   11/21/20 57 6 kg (127 lb)   05/31/20 61 9 kg (136 lb 7 4 oz)     · History of congestive heart failure noted in the patient's chart  · No previous ECHO results able to be found  · Echocardiogram done on this admission:  Ejection fraction is 60%  Wall motion is normal   Moderate aortic valve stenosis  Mild mitral valve regurgitation  Mild tricuspid valve regurgitation  · Has elevated   · No LE edema appreciated on physical exam  · Is not prescribed outpatient diuretic or HTN medication  · Has weakness/stroke-like symptoms as above  · Does not appear to be in overload  No need of intravenous diuresis  · Continue monitoring and consider IV diuresis if s/s of volume overload become apparent

## 2022-05-06 NOTE — CASE MANAGEMENT
Case Management Discharge Planning Note    Patient name Charisse Clement  Location [address] 212/-01 MRN [id number]  : 1930 Date 2022       Current Admission Date: 2022  Current Admission Diagnosis:Cerebrovascular accident (CVA) due to stenosis of right posterior cerebral artery [facility])   Patient Active Problem List    Diagnosis Date Noted    Hyponatremia 2022    Cerebrovascular accident (CVA) due to stenosis of right posterior cerebral artery ([facility] ) 2022    Urinary frequency 2022    Pressure injury of left heel, unstageable ([facility] ) 2020    Syncope 10/25/2019    Pressure injury of left buttock, stage 1 10/25/2019    Late onset Alzheimer's disease without behavioral disturbance [facility] ) 10/25/2019    Need for immunization against influenza 2018    Type 2 diabetes mellitus without complication, with long-term current use of insulin ([facility] ) 2018    Chronic diastolic congestive heart failure ([facility] ) 2018    Aortic valve disorder 2018      LOS (days): 4  Geometric Mean LOS (GMLOS) (days): 3 00  Days to GMLOS:-0 6     OBJECTIVE:  Risk of Unplanned Readmission Score: 15         Current admission status: Inpatient   Preferred Pharmacy:   [facility] # [address] [place] [id number]  Phone: [phone] Fax: [phone]    Primary Care Provider: Christiane Ospina MD    Primary Insurance: MEDICARE  Secondary Insurance: [facility]    DISCHARGE DETAILS:    IMM Given (Date):: 22  IMM Given to[de-identified] Family  Family notified[de-identified] SonXin     Additional Comments: CM spoke with [address] [place] [address] liaison who reports that, because it was documented that pt received Zyprexa on  they are unable to accept pt today  CM explained that it was given on the night of the  but because of the documentation on the  they are unable to accept  CM department to follow with [address] [place] [address] tomorrow   CM provided update to pt's son, Osiel De La O reviewed  IMM with pt's son who expressed confirmation of understanding  IMM in scan bin to be entered into pt's EHR

## 2022-05-06 NOTE — ASSESSMENT & PLAN NOTE
Lab Results   Component Value Date    HGBA1C 8 2 (H) 05/03/2022     · Presently, blood sugars are at acceptable levels  · Continue NovoLog 70/30 20 units in the morning and 15 units in the evening  · Correctional SSI  · Hypoglycemia protocol; diabetic diet ordered  · Adjust treatment accordingly

## 2022-05-06 NOTE — DISCHARGE INSTR - DIET
Regular solids, thin liquids  1:1 feeding assistance  Liquid wash  Upright 90' for all oral intake and >45' for 30-45m following

## 2022-05-06 NOTE — ASSESSMENT & PLAN NOTE
· Had episodes of confusion, not able to answer questions properly but compared to admission the agitation has significantly decreased  · Today able to answer simple questions  · Son reports patient is at mental baseline  · Status post one-time dose IM zyprexa and utilize soft restraint belt to avoid fall at this time  · Son is looking for short term rehab or nursing home placement due to difficulty taking care of the patient at home  · Delirium precautions  · Case management consulted, as patient will likely need placement  · No need of any restraints or one-to-one observation at this time  · As per case management, patient will be accepted to the rehabilitation facility, once without physical or chemical restraints for at least 24 hours

## 2022-05-06 NOTE — ASSESSMENT & PLAN NOTE
· Resolved  · Sodium 134- now stable at 138  · Status post IV fluids    · Periodically monitoring of BMP    Results from last 7 days   Lab Units 05/04/22  0448 05/02/22  1853   SODIUM mmol/L 138 134*   POTASSIUM mmol/L 4 4 3 7   CHLORIDE mmol/L 104 99*   CO2 mmol/L 25 25   ANION GAP mmol/L 9 10   BUN mg/dL 17 20   CREATININE mg/dL 0 87 0 99   CALCIUM mg/dL 8 7 8 8   ALBUMIN g/dL  --  3 2*   TOTAL BILIRUBIN mg/dL  --  0 46   ALK PHOS U/L  --  132*   ALT U/L  --  24   AST U/L  --  26   EGFR ml/min/1 73sq m 58 49   GLUCOSE RANDOM mg/dL 182* 152*

## 2022-05-06 NOTE — SPEECH THERAPY NOTE
Speech Language/Pathology     Speech/Language Pathology Progress Note     Patient Name: Jay More    Today's Date: 5/6/2022     Problem List  Principal Problem:    Cerebrovascular accident (CVA) due to stenosis of right posterior cerebral artery ([facility] )  Active Problems:    Type 2 diabetes mellitus without complication, with long-term current use of insulin (HCC)    CHF (congestive heart failure) ([facility] )    Late onset Alzheimer's disease without behavioral disturbance [facility] )    Urinary frequency    Hyponatremia     Recommendations:   Diet: regular diet and thin liquids   Meds: whole with liquid   Feeding assistance: 1:1 supervision and assist as needed  Frequent Oral care: 2-4x/day  Aspiration precautions and compensatory swallowing strategies: upright posture, only feed when fully alert, slow rate of feeding, small bites/sips and alternating bites and sips  Other Recommendations/ considerations: Liquid wash  / alternate solids and liquids        Subjective:  Patient received awake and content  PCA feeding breakfast       Previous/current diet: regular/thin    Objective: The following consistencies were tested regular solids, thin liquids  Patient presents with functional bolus containment, manipulation and control  Mastication judged to be prolonged, yet functional when provided with small bites  Benefits from frequent liquid wash to ensure clearing of oral cavity  No overt s/s of aspiration or distress  Vocal quality noted to remain clear  Assessment:  Oropharyngeal swallow function appears uncahnged from previous encounters  Tolerating regular solids and thin liquids with feeding assistance and use of mealtime strategies outlined above  Appears to be at baseline  Plan:  No additional follow-up from this department at this time  Please reorder should concerns arise  Aspiration precautions suggested to carry over at next level of care; patient planned for d/c today           Earl Modi MS, [address] [facility]  Speech-Language Pathologist  [place] #[id number]  [place] #[id number]

## 2022-05-06 NOTE — CASE MANAGEMENT
Case Management Discharge Planning Note    Patient name Katya Padilla  Location [address] 212/-01 MRN [id number]  : 1930 Date 2022       Current Admission Date: 2022  Current Admission Diagnosis:Cerebrovascular accident (CVA) due to stenosis of right posterior cerebral artery [facility])   Patient Active Problem List    Diagnosis Date Noted    Hyponatremia 2022    Cerebrovascular accident (CVA) due to stenosis of right posterior cerebral artery ([facility] ) 2022    Urinary frequency 2022    [facility] of [facility]able [facility] 2020    Syncope 10/25/2019    Pressure injury of left buttock, stage 1 10/25/2019    Late onset Alzheimer's disease without behavioral disturbance ([facility] ) 10/25/2019    Need for immunization against influenza 2018    Type 2 diabetes mellitus without complication, with long-term current use of insulin ([facility] ) 2018    CHF (congestive heart failure) ([facility] ) 2018    Aortic valve disorder 2018      LOS (days): 4  Geometric Mean LOS (GMLOS) (days): 3 00  Days to GMLOS:-0 4     OBJECTIVE:  Risk of Unplanned Readmission Score: 15         Current admission status: Inpatient   Preferred Pharmacy:   [facility] # [address] [place] [address]  Phone: [phone] Fax: [phone]    Primary Care Provider: Marcela Wolff MD    Primary Insurance: MEDICARE  Secondary Insurance: [facility]    DISCHARGE DETAILS:     Additional Comments: JOHN spoke with [facility] liaison who requested docuementation from yesterday and today be faxed to F: [phone] for review to provide a final determination  Documentation faxed as requested, JOHN department to follow

## 2022-05-06 NOTE — PROGRESS NOTES
[facility]  Progress Note - Armani Olegario 8/30/1930, 80 y o  female MRN: [id number]  Unit/Bed#: -Lora Encounter: [id number]  Primary Care Provider: Frankey Cheers, MD   Date and time admitted to hospital: 5/2/2022  8:03 PM    * Cerebrovascular accident (CVA) due to stenosis of right posterior cerebral artery [facility])  Assessment & Plan  Son reports increased drowsiness, dizziness, inability to walk, and left sided weakness ("she won't move her left foot" and "her left hand is weak") starting Saturday  Due to underlying dementia and agitation ROS or history is unable to be obtained from patient  Son reports patient was at her normal ambulatory baseline Friday (walker), then noticed complete inability to ambulate Saturday  · ED note mentions right arm weakness, however patient not currently able to cooperate with full physical exam  · Not hypoglycemic; has mild hyponatremia as below without other major electrolyte disturbance  · CTA without evidence for acute infarct, bleed, or mass effect, however is showing occlusion vs severe stenosis of the right posterior cerebral artery and 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  · Status post Discontinue Tele monitoring right now, permissive HTN and neuro checks per stroke protocol, daily ASA and atorvastatin  · Neurology consulted and their input is as follows    Per Dr George Couch from Neurology    · Initial CT scan of the head was unremarkable, CTA of the head and neck showed severe stenosis/occlusion of the right PCA, with 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  · As per neurologist, patient will be on dual anti-platelet therapy with aspirin 81 mg once a day as well as Plavix 75 mg once a day for 21 days, followed by aspirin monotherapy  Continue atorvastatin  Goal is now normotension  Avoid hypotension  · Presently waiting for rehabilitation facility placement, for further PT/OT  Hyponatremia  Assessment & Plan  · Resolved  · Sodium 134- now stable at 138  · Status post IV fluids  · Periodically monitoring of BMP    Results from last 7 days   Lab Units 05/04/22  0448 05/02/22  1853   SODIUM mmol/L 138 134*   POTASSIUM mmol/L 4 4 3 7   CHLORIDE mmol/L 104 99*   CO2 mmol/L 25 25   ANION GAP mmol/L 9 10   BUN mg/dL 17 20   CREATININE mg/dL 0 87 0 99   CALCIUM mg/dL 8 7 8 8   ALBUMIN g/dL  --  3 2*   TOTAL BILIRUBIN mg/dL  --  0 46   ALK PHOS U/L  --  132*   ALT U/L  --  24   AST U/L  --  26   EGFR ml/min/1 73sq m 58 49   GLUCOSE RANDOM mg/dL 182* 152*         Urinary frequency  Assessment & Plan  · ED note reports increasing urinary frequency since Friday without fever  · Son feels this increased frequency was actually agitation with patient repeatedly trying to get up and walk around at home  · Due to dementia as above patient is unable to provide detailed history  · No evidence of any urine infection  Late onset Alzheimer's disease without behavioral disturbance ([facility] )  Assessment & Plan  · Had episodes of confusion, not able to answer questions properly but compared to admission the agitation has significantly decreased  · Today able to answer simple questions    · Son reports patient is at mental baseline  · Status post one-time dose IM zyprexa and utilize soft restraint belt to avoid fall at this time  · Son is looking for short term rehab or nursing home placement due to difficulty taking care of the patient at home  · Delirium precautions  · Case management consulted, as patient will likely need placement  · No need of any restraints or one-to-one observation at this time  · As per case management, patient will be accepted to the rehabilitation facility, once without physical or chemical restraints for at least 24 hours  Chronic diastolic congestive heart failure (HCC)  Assessment & Plan  Wt Readings from Last 3 Encounters:   05/03/22 57 2 kg (126 lb)   11/21/20 57 6 kg (127 lb)   05/31/20 61 9 kg (136 lb 7 4 oz)     · History of congestive heart failure noted in the patient's chart  · No previous ECHO results able to be found  · Echocardiogram done on this admission:  Ejection fraction is 60%  Wall motion is normal   Moderate aortic valve stenosis  Mild mitral valve regurgitation  Mild tricuspid valve regurgitation  · Has elevated   · No LE edema appreciated on physical exam  · Is not prescribed outpatient diuretic or HTN medication  · Has weakness/stroke-like symptoms as above  · Does not appear to be in overload  No need of intravenous diuresis  · Continue monitoring and consider IV diuresis if s/s of volume overload become apparent  Type 2 diabetes mellitus without complication, with long-term current use of insulin ([facility])  Assessment & Plan  Lab Results   Component Value Date    HGBA1C 8 2 (H) 05/03/2022     · Presently, blood sugars are at acceptable levels  · Continue NovoLog 70/30 20 units in the morning and 15 units in the evening  · Correctional SSI  · Hypoglycemia protocol; diabetic diet ordered  · Adjust treatment accordingly  VTE Pharmacologic Prophylaxis: VTE Score: 8 High Risk (Score >/= 5) - Pharmacological DVT Prophylaxis Ordered: enoxaparin (Lovenox)  Sequential Compression Devices Ordered      Patient Centered Rounds: I performed bedside rounds with nursing staff today  Discussions with Specialists or Other Care Team Provider:  Case management  Education and Discussions with Family / Patient: Updated  (son) via phone  Time Spent for Care: 30 minutes  More than 50% of total time spent on counseling and coordination of care as described above  Current Length of Stay: 4 day(s)  Current Patient Status: Inpatient   Certification Statement: The patient will continue to require additional inpatient hospital stay due to Above findings and plans  Discharge Plan: Anticipate discharge tomorrow to rehab facility  Code Status: Level 3 - DNAR and DNI    Subjective:   According to the patient's nurse, patient was sleepy are earlier today  However, when I came in, patient was awake, and able to answer simple questions  Patient told me that she is doing fine  She denies any symptoms like pains or any shortness breath  Patient also denies any weakness or numbness  According to , patient received a dose of Zyprexa within 24 hours, thus rehabilitation facility could not take her yet today  So far, no agitation episodes  No physical restraints  Objective:     Vitals:   Temp (24hrs), Av 7 °F (37 1 °C), Min:98 1 °F (36 7 °C), Max:99 3 °F (37 4 °C)    Temp:  [98 1 °F (36 7 °C)-99 3 °F (37 4 °C)] 99 3 °F (37 4 °C)  HR:  [] 68  Resp:  [16-18] 16  BP: (132-163)/() 159/52  SpO2:  [93 %-99 %] 93 %  Body mass index is 24 61 kg/m²  Input and Output Summary (last 24 hours): Intake/Output Summary (Last 24 hours) at 2022 1249  Last data filed at 2022 2316  Gross per 24 hour   Intake 530 ml   Output --   Net 530 ml       Physical Exam:   Physical Exam  Vitals and nursing note reviewed  Constitutional:       General: She is not in acute distress  Appearance: She is not toxic-appearing or diaphoretic  Cardiovascular:      Rate and Rhythm: Normal rate and regular rhythm  Heart sounds: Normal heart sounds  Pulmonary:      Effort: Pulmonary effort is normal  No respiratory distress  Breath sounds: Normal breath sounds  Abdominal:      General: Bowel sounds are normal  There is no distension  Palpations: Abdomen is soft  Tenderness: There is no abdominal tenderness  Musculoskeletal:      Right lower leg: No edema  Left lower leg: No edema  Skin:     General: Skin is warm  Coloration: Skin is not pale  Findings: No erythema or rash  Neurological:      Mental Status: Mental status is at baseline  Cranial Nerves: No cranial nerve deficit  Sensory: No sensory deficit  Motor: Weakness present  Comments: Motor examination:  4/5 at the left upper and left lower extremities, at baseline since admission  Other extremities, 5/5  Psychiatric:         Mood and Affect: Mood normal             Additional Data:     Labs:  Results from last 7 days   Lab Units 05/04/22 0448 05/02/22 1853 05/02/22  1853   WBC Thousand/uL 9 28   < > 10 81*   HEMOGLOBIN g/dL 12 8   < > 13 1   HEMATOCRIT % 39 9   < > 38 6   PLATELETS Thousands/uL 178   < > 206   NEUTROS PCT %  --   --  78*   LYMPHS PCT %  --   --  13*   MONOS PCT %  --   --  7   EOS PCT %  --   --  2    < > = values in this interval not displayed  Results from last 7 days   Lab Units 05/04/22 0448 05/02/22  1853 05/02/22  1853   SODIUM mmol/L 138   < > 134*   POTASSIUM mmol/L 4 4   < > 3 7   CHLORIDE mmol/L 104   < > 99*   CO2 mmol/L 25   < > 25   BUN mg/dL 17   < > 20   CREATININE mg/dL 0 87   < > 0 99   ANION GAP mmol/L 9   < > 10   CALCIUM mg/dL 8 7   < > 8 8   ALBUMIN g/dL  --   --  3 2*   TOTAL BILIRUBIN mg/dL  --   --  0 46   ALK PHOS U/L  --   --  132*   ALT U/L  --   --  24   AST U/L  --   --  26   GLUCOSE RANDOM mg/dL 182*   < > 152*    < > = values in this interval not displayed           Results from last 7 days   Lab Units 05/06/22  1106 05/06/22  8617 05/05/22 2036 05/05/22  1610 05/05/22  1107 05/05/22  0783 05/04/22  2036 05/04/22  1546 05/04/22  1218 05/04/22  0814 05/03/22  2153 05/03/22  1703   POC GLUCOSE mg/dl 194* 181* 185* 144* 151* 154* 146* 122 115 233* 259* 155*     Results from last 7 days   Lab Units 05/03/22  0544   HEMOGLOBIN A1C % 8 2*           Lines/Drains:  Invasive Devices  Report    Peripheral Intravenous Line            Peripheral IV 05/05/22 Dorsal (posterior); Right Forearm 1 day                      Imaging: Reviewed radiology reports from this admission including: chest xray, CT head and MRI brain    Recent Cultures (last 7 days):         Last 24 Hours Medication List:   Current Facility-Administered Medications   Medication Dose Route Frequency Provider Last Rate    aspirin  81 mg Oral Daily Alona Patton PA-C      atorvastatin  40 mg Oral QPM Mick Vo      clopidogrel  75 mg Oral Daily BRIAN Rosen      enoxaparin  40 mg Subcutaneous Daily Alona Patton PA-C      insulin aspart protamine-insulin aspart  15 Units Subcutaneous QPM Arturo Diaz MD      insulin aspart protamine-insulin aspart  20 Units Subcutaneous QAM Arturo Diaz MD      insulin lispro  1-5 Units Subcutaneous TID AC Alona Patton [place]      insulin lispro  1-5 Units Subcutaneous HS Ted Arshad PA-C      OLANZapine  5 mg Oral HS PRN Arturo Diaz MD          Today, Patient Was Seen By: Quentin Hilario MD    **Please Note: This note may have been constructed using a voice recognition system  **

## 2022-05-07 LAB
GLUCOSE SERPL-MCNC: 118 MG/DL (ref 65–140)
GLUCOSE SERPL-MCNC: 151 MG/DL (ref 65–140)
GLUCOSE SERPL-MCNC: 155 MG/DL (ref 65–140)
GLUCOSE SERPL-MCNC: 265 MG/DL (ref 65–140)

## 2022-05-07 PROCEDURE — 82948 REAGENT STRIP/BLOOD GLUCOSE: CPT

## 2022-05-07 PROCEDURE — 0051A: CPT | Performed by: INTERNAL MEDICINE

## 2022-05-07 PROCEDURE — 91305 COVID-19 PFIZER VAC (TRIS SUCROSE, GRAY CAP FORM) 30 MCG/0.3ML SUSP: CPT | Performed by: INTERNAL MEDICINE

## 2022-05-07 PROCEDURE — 99232 SBSQ HOSP IP/OBS MODERATE 35: CPT | Performed by: INTERNAL MEDICINE

## 2022-05-07 RX ADMIN — CLOPIDOGREL BISULFATE 75 MG: 75 TABLET ORAL at 08:38

## 2022-05-07 RX ADMIN — ATORVASTATIN CALCIUM 40 MG: 40 TABLET, FILM COATED ORAL at 17:17

## 2022-05-07 RX ADMIN — ASPIRIN 81 MG: 81 TABLET, CHEWABLE ORAL at 08:38

## 2022-05-07 RX ADMIN — ENOXAPARIN SODIUM 40 MG: 40 INJECTION SUBCUTANEOUS at 08:38

## 2022-05-07 RX ADMIN — INSULIN ASPART 20 UNITS: 100 INJECTION, SUSPENSION SUBCUTANEOUS at 08:39

## 2022-05-07 RX ADMIN — INSULIN LISPRO 1 UNITS: 100 INJECTION, SOLUTION INTRAVENOUS; SUBCUTANEOUS at 08:39

## 2022-05-07 RX ADMIN — BNT162B2 0.3 ML: 0.23 INJECTION, SUSPENSION INTRAMUSCULAR at 16:18

## 2022-05-07 RX ADMIN — INSULIN LISPRO 1 UNITS: 100 INJECTION, SOLUTION INTRAVENOUS; SUBCUTANEOUS at 13:30

## 2022-05-07 RX ADMIN — INSULIN LISPRO 2 UNITS: 100 INJECTION, SOLUTION INTRAVENOUS; SUBCUTANEOUS at 21:45

## 2022-05-07 RX ADMIN — INSULIN ASPART 15 UNITS: 100 INJECTION, SUSPENSION SUBCUTANEOUS at 17:18

## 2022-05-07 NOTE — PLAN OF CARE
Problem: Nutrition/Hydration-ADULT  Goal: Nutrient/Hydration intake appropriate for improving, restoring or maintaining nutritional needs  Description: Monitor and assess patient's nutrition/hydration status for malnutrition  Collaborate with interdisciplinary team and initiate plan and interventions as ordered  Monitor patient's weight and dietary intake as ordered or per policy  Utilize nutrition screening tool and intervene as necessary  Determine patient's food preferences and provide high-protein, high-caloric foods as appropriate       INTERVENTIONS:  - Monitor oral intake, urinary output, labs, and treatment plans  - Assess nutrition and hydration status and recommend course of action  - Evaluate amount of meals eaten  - Assist patient with eating if necessary   - Allow adequate time for meals  - Recommend/ encourage appropriate diets, oral nutritional supplements, and vitamin/mineral supplements  - Order, calculate, and assess calorie counts as needed  - Assess need for intravenous fluids  - Provide nutrition/hydration education as appropriate  - Include patient/family/caregiver in decisions related to nutrition  Outcome: Progressing     Problem: MUSCULOSKELETAL - ADULT  Goal: Maintain or return mobility to safest level of function  Description: INTERVENTIONS:  - Assess patient's ability to carry out ADLs; assess patient's baseline for ADL function and identify physical deficits which impact ability to perform ADLs (bathing, care of mouth/teeth, toileting, grooming, dressing, etc )  - Assess/evaluate cause of self-care deficits   - Assess range of motion  - Assess patient's mobility  - Assess patient's need for assistive devices and provide as appropriate  - Encourage maximum independence but intervene and supervise when necessary  - Involve family in performance of ADLs  - Assess for home care needs following discharge   - Consider OT consult to assist with ADL evaluation and planning for discharge  - Provide patient education as appropriate  Outcome: Progressing  Goal: Maintain proper alignment of affected body part  Description: INTERVENTIONS:  - Support, maintain and protect limb and body alignment  - Provide patient/ family with appropriate education  Outcome: Progressing

## 2022-05-07 NOTE — ASSESSMENT & PLAN NOTE
Son reports increased drowsiness, dizziness, inability to walk, and left sided weakness ("she won't move her left foot" and "her left hand is weak") starting Saturday  Due to underlying dementia and agitation ROS or history is unable to be obtained from patient  Son reports patient was at her normal ambulatory baseline Friday (walker), then noticed complete inability to ambulate Saturday  · ED note mentions right arm weakness, however patient not currently able to cooperate with full physical exam  · Not hypoglycemic; has mild hyponatremia as below without other major electrolyte disturbance  · CTA without evidence for acute infarct, bleed, or mass effect, however is showing occlusion vs severe stenosis of the right posterior cerebral artery and 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  · Status post Discontinue Tele monitoring right now, permissive HTN and neuro checks per stroke protocol, daily ASA and atorvastatin  · Neurology consulted and their input is as follows    Per Dr Jens Brennan from Neurology    · Initial CT scan of the head was unremarkable, CTA of the head and neck showed severe stenosis/occlusion of the right PCA, with 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, her labs revealed significant hyperlipidemia, and on examination patient had subtle left-sided weakness with neglect and field cut to suspect a right PCA infarct  Patient admitted on stroke pathway, MRI of the brain done subsequently confirms the presence of a right PCA infarct of recent duration, patient started on dual anti-platelet therapy,/statin therapy will recommend adequate blood pressure and, blood sugar control and patient may need inpatient rehab  At baseline she also suffers from dementia late onset Alzheimer's type and hence recommend delirium precautions     · As per neurologist, patient will be on dual anti-platelet therapy with aspirin 81 mg once a day as well as Plavix 75 mg once a day for 21 days, followed by aspirin monotherapy  Continue atorvastatin  Goal is now normotension  Avoid hypotension  Neurologist is okay with discharge of the patient  · Presently waiting for rehabilitation facility placement, for further PT/OT

## 2022-05-07 NOTE — CASE MANAGEMENT
Case Management Progress Note    Patient name Maegan Eller  Location [address] [id number] MRN [id number]  : 1930 Date 2022       LOS (days): 5  Geometric Mean LOS (GMLOS) (days): 3 00  Days to GMLOS:-1 6        OBJECTIVE:        Current admission status: Inpatient  Preferred Pharmacy:   [id number], [address] [place] [address] [address] [place] [id number]  Phone: [phone] Fax: [phone]    Primary Care Provider: Velna Kussmaul, MD    Primary Insurance: MEDICARE  Secondary Insurance: PH[employer]    PROGRESS NOTE:      CM notified that patient is medically stable for DC  CM followed up on STR referrals  [facility] patient does not have an unvaccinated bed available over the weekend  CM sent follow up messages to other rehabs that were still following patient but who had not had a bed available when referral was initially sent including: all the [address] [place] [address], [address], and [facility]  CM department will continue to follow to assist with discharge coordination

## 2022-05-07 NOTE — ASSESSMENT & PLAN NOTE
· Had episodes of confusion, not able to answer questions properly but compared to admission the agitation has significantly decreased  · Today able to answer simple questions  · Son reports patient is at mental baseline  · Status post one-time dose IM zyprexa and utilize soft restraint belt to avoid fall at this time  · Son is looking for short term rehab or nursing home placement due to difficulty taking care of the patient at home  · Delirium precautions  · Case management consulted, as patient will need placement  · No need of any restraints or one-to-one observation at this time  · As per case management, patient will be accepted to the rehabilitation facility, once without physical or chemical restraints for at least 24 hours  As per patient's nurse, no physical or chemical restraints done more than 24 hours now  However, according to case management, no accepting facility yet at this point, as it is hard to get an accepting facility as patient is still unvaccinated for the COVID-19  This was discussed with the patient's son and he is okay with his mother having her 1st COVID-19 vaccine here (in fact, he has been telling me that he has been asking for this even during her admission)  As per case management, usually, patient should be fully vaccinated (at least 2 shots), to be easier to get a facility, and this was also discussed with the patient's son  Okay with the 1st COVID 19 vaccine here  Patient was told about this and okay with this plan

## 2022-05-07 NOTE — PROGRESS NOTES
[facility]  Progress Note - Chel Santana 8/30/1930, 80 y o  female MRN: [id number]  Unit/Bed#: -[facility] Encounter: [id number]  Primary Care Provider: Brittany Willett MD   Date and time admitted to hospital: 5/2/2022  8:03 PM    * Cerebrovascular accident (CVA) due to stenosis of right posterior cerebral artery [facility])  Assessment & Plan  Son reports increased drowsiness, dizziness, inability to walk, and left sided weakness ("she won't move her left foot" and "her left hand is weak") starting Saturday  Due to underlying dementia and agitation ROS or history is unable to be obtained from patient  Son reports patient was at her normal ambulatory baseline Friday (walker), then noticed complete inability to ambulate Saturday  · ED note mentions right arm weakness, however patient not currently able to cooperate with full physical exam  · Not hypoglycemic; has mild hyponatremia as below without other major electrolyte disturbance  · CTA without evidence for acute infarct, bleed, or mass effect, however is showing occlusion vs severe stenosis of the right posterior cerebral artery and 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  · Status post Discontinue Tele monitoring right now, permissive HTN and neuro checks per stroke protocol, daily ASA and atorvastatin  · Neurology consulted and their input is as follows    Per Dr Isabella Aquino from Neurology    · Initial CT scan of the head was unremarkable, CTA of the head and neck showed severe stenosis/occlusion of the right PCA, with 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  · As per neurologist, patient will be on dual anti-platelet therapy with aspirin 81 mg once a day as well as Plavix 75 mg once a day for 21 days, followed by aspirin monotherapy  Continue atorvastatin  Goal is now normotension  Avoid hypotension  Neurologist is okay with discharge of the patient  · Presently waiting for rehabilitation facility placement, for further PT/OT  Hyponatremia  Assessment & Plan  · Resolved  · Sodium 134- now stable at 138  · Status post IV fluids  · Periodically monitoring of BMP    Results from last 7 days   Lab Units 05/04/22  0448 05/02/22  1853   SODIUM mmol/L 138 134*   POTASSIUM mmol/L 4 4 3 7   CHLORIDE mmol/L 104 99*   CO2 mmol/L 25 25   ANION GAP mmol/L 9 10   BUN mg/dL 17 20   CREATININE mg/dL 0 87 0 99   CALCIUM mg/dL 8 7 8 8   ALBUMIN g/dL  --  3 2*   TOTAL BILIRUBIN mg/dL  --  0 46   ALK PHOS U/L  --  132*   ALT U/L  --  24   AST U/L  --  26   EGFR ml/min/1 73sq m 58 49   GLUCOSE RANDOM mg/dL 182* 152*         Urinary frequency  Assessment & Plan  · ED note reports increasing urinary frequency since Friday without fever  · Son feels this increased frequency was actually agitation with patient repeatedly trying to get up and walk around at home  · Due to dementia as above patient is unable to provide detailed history  · No evidence of any urine infection  Late onset Alzheimer's disease without behavioral disturbance ([facility] [address] )  Assessment & Plan  · Had episodes of confusion, not able to answer questions properly but compared to admission the agitation has significantly decreased  · Today able to answer simple questions    · Son reports patient is at mental baseline  · Status post one-time dose IM zyprexa and utilize soft restraint belt to avoid fall at this time  · Son is looking for short term rehab or nursing home placement due to difficulty taking care of the patient at home  · Delirium precautions  · Case management consulted, as patient will need placement  · No need of any restraints or one-to-one observation at this time  · As per case management, patient will be accepted to the rehabilitation facility, once without physical or chemical restraints for at least 24 hours  As per patient's nurse, no physical or chemical restraints done more than 24 hours now  However, according to case management, no accepting facility yet at this point, as it is hard to get an accepting facility as patient is still unvaccinated for the COVID-19  This was discussed with the patient's son and he is okay with his mother having her 1st COVID-19 vaccine here (in fact, he has been telling me that he has been asking for this even during her admission)  As per case management, usually, patient should be fully vaccinated (at least 2 shots), to be easier to get a facility, and this was also discussed with the patient's son  Okay with the 1st COVID 19 vaccine here  Patient was told about this and okay with this plan  Chronic diastolic congestive heart failure (HCC)  Assessment & Plan  Wt Readings from Last 3 Encounters:   05/03/22 57 2 kg (126 lb)   11/21/20 57 6 kg (127 lb)   05/31/20 61 9 kg (136 lb 7 4 oz)     · History of congestive heart failure noted in the patient's chart  · No previous ECHO results able to be found  · Echocardiogram done on this admission:  Ejection fraction is 60%  Wall motion is normal   Moderate aortic valve stenosis  Mild mitral valve regurgitation  Mild tricuspid valve regurgitation  · Has elevated   · No LE edema appreciated on physical exam  · Is not prescribed outpatient diuretic or HTN medication  · Has weakness/stroke-like symptoms as above  · Does not appear to be in overload    No need of intravenous diuresis  · Continue monitoring and consider IV diuresis if s/s of volume overload become apparent  Type 2 diabetes mellitus without complication, with long-term current use of insulin (HCC)  Assessment & Plan  Lab Results   Component Value Date    HGBA1C 8 2 (H) 2022     · Presently, blood sugars are at acceptable levels  · Continue NovoLog 70/30 20 units in the morning and 15 units in the evening  · Correctional SSI  · Hypoglycemia protocol; diabetic diet ordered  · Adjust treatment accordingly  VTE Pharmacologic Prophylaxis: VTE Score: 8 High Risk (Score >/= 5) - Pharmacological DVT Prophylaxis Ordered: enoxaparin (Lovenox)  Sequential Compression Devices Ordered  Patient Centered Rounds: I performed bedside rounds with nursing staff today  Discussions with Specialists or Other Care Team Provider:  Case management  Education and Discussions with Family / Patient: Updated  (son) at bedside  And initially on the phone  Time Spent for Care: 30 minutes  More than 50% of total time spent on counseling and coordination of care as described above  Current Length of Stay: 5 day(s)  Current Patient Status: Inpatient   Certification Statement: The patient will continue to require additional inpatient hospital stay due to Above findings and plans  Discharge Plan: Anticipate discharge tomorrow to rehab facility  Code Status: Level 3 - DNAR and DNI    Subjective:   Patient was awake and answers simple questions  Patient told me, that she is doing fine  Patient denies any pains or any shortness of breath  No nausea or vomiting  Patient still has weakness on the left side  No other complaints      Objective:     Vitals:   Temp (24hrs), Av 6 °F (36 4 °C), Min:96 5 °F (35 8 °C), Max:98 4 °F (36 9 °C)    Temp:  [96 5 °F (35 8 °C)-98 4 °F (36 9 °C)] 97 8 °F (36 6 °C)  HR:  [47-86] 83  Resp:  [16-20] 18  BP: (121-157)/(59-66) 125/66  SpO2:  [95 %-98 %] 95 %  Body mass index is 24 61 kg/m²  Input and Output Summary (last 24 hours): Intake/Output Summary (Last 24 hours) at 5/7/2022 1414  Last data filed at 5/6/2022 1900  Gross per 24 hour   Intake 360 ml   Output --   Net 360 ml       Physical Exam:   Physical Exam  Vitals and nursing note reviewed  Constitutional:       General: She is not in acute distress  Appearance: She is not toxic-appearing or diaphoretic  Cardiovascular:      Rate and Rhythm: Normal rate and regular rhythm  Heart sounds: Normal heart sounds  Pulmonary:      Effort: Pulmonary effort is normal  No respiratory distress  Breath sounds: Normal breath sounds  Abdominal:      General: Bowel sounds are normal  There is no distension  Palpations: Abdomen is soft  Tenderness: There is no abdominal tenderness  Musculoskeletal:      Right lower leg: No edema  Left lower leg: No edema  Skin:     General: Skin is warm  Coloration: Skin is not pale  Findings: No erythema or rash  Neurological:      Mental Status: She is alert  Cranial Nerves: No cranial nerve deficit  Sensory: No sensory deficit  Motor: Weakness present  Comments: Motor examination:  Left upper and left lower extremities, 4/5  Right upper and right lower extremities, 5/5  Psychiatric:         Mood and Affect: Mood normal             Additional Data:     Labs:  Results from last 7 days   Lab Units 05/04/22 0448 05/02/22 1853 05/02/22 1853   WBC Thousand/uL 9 28   < > 10 81*   HEMOGLOBIN g/dL 12 8   < > 13 1   HEMATOCRIT % 39 9   < > 38 6   PLATELETS Thousands/uL 178   < > 206   NEUTROS PCT %  --   --  78*   LYMPHS PCT %  --   --  13*   MONOS PCT %  --   --  7   EOS PCT %  --   --  2    < > = values in this interval not displayed       Results from last 7 days   Lab Units 05/04/22 0448 05/02/22  1853 05/02/22  1853   SODIUM mmol/L 138   < > 134*   POTASSIUM mmol/L 4 4   < > 3 7   CHLORIDE mmol/L 104   < > 99* CO2 mmol/L 25   < > 25   BUN mg/dL 17   < > 20   CREATININE mg/dL 0 87   < > 0 99   ANION GAP mmol/L 9   < > 10   CALCIUM mg/dL 8 7   < > 8 8   ALBUMIN g/dL  --   --  3 2*   TOTAL BILIRUBIN mg/dL  --   --  0 46   ALK PHOS U/L  --   --  132*   ALT U/L  --   --  24   AST U/L  --   --  26   GLUCOSE RANDOM mg/dL 182*   < > 152*    < > = values in this interval not displayed  Results from last 7 days   Lab Units 05/07/22  1129 05/07/22  0749 05/06/22  2106 05/06/22  1551 05/06/22  1106 05/06/22  0713 05/05/22  2036 05/05/22  1610 05/05/22  1107 05/05/22  0702 05/04/22 2036 05/04/22  1546   POC GLUCOSE mg/dl 155* 151* 159* 146* 194* 181* 185* 144* 151* 154* 146* 122     Results from last 7 days   Lab Units 05/03/22  0544   HEMOGLOBIN A1C % 8 2*           Lines/Drains:  Invasive Devices  Report    Peripheral Intravenous Line            Peripheral IV 05/05/22 Dorsal (posterior); Right Forearm 2 days                      Imaging: Reviewed radiology reports from this admission including: chest xray, CT head and MRI brain    Recent Cultures (last 7 days):         Last 24 Hours Medication List:   Current Facility-Administered Medications   Medication Dose Route Frequency Provider Last Rate    aspirin  81 mg Oral Daily LV Elias-YOJANA      atorvastatin  40 mg Oral QPM Rubén Colón PA-C      clopidogrel  75 mg Oral Daily BRIAN Rosen      COVID-19 Pfizer vac (jad sucrose, gray cap form)  0 3 mL Intramuscular Once Ailyn Martin MD      enoxaparin  40 mg Subcutaneous Daily Rubén Colón PA-C      insulin aspart protamine-insulin aspart  15 Units Subcutaneous QPM Mp Orourke MD      insulin aspart protamine-insulin aspart  20 Units Subcutaneous QAM Mp Orourke MD      insulin lispro  1-5 Units Subcutaneous TID AC Mick Elias      insulin lispro  1-5 Units Subcutaneous HS Anselmariel Arshad PA-C      OLANZapine  5 mg Oral HS PRN Mp Orourke MD Today, Patient Was Seen By: [id number] Jeramy Calderón MD    **Please Note: This note may have been constructed using a voice recognition system  **

## 2022-05-08 LAB
ANION GAP SERPL CALCULATED.3IONS-SCNC: 7 MMOL/L (ref 4–13)
BUN SERPL-MCNC: 19 MG/DL (ref 5–25)
CALCIUM SERPL-MCNC: 8.7 MG/DL (ref 8.3–10.1)
CHLORIDE SERPL-SCNC: 102 MMOL/L (ref 100–108)
CO2 SERPL-SCNC: 27 MMOL/L (ref 21–32)
CREAT SERPL-MCNC: 0.88 MG/DL (ref 0.6–1.3)
GFR SERPL CREATININE-BSD FRML MDRD: 57 ML/MIN/1.73SQ M
GLUCOSE SERPL-MCNC: 142 MG/DL (ref 65–140)
GLUCOSE SERPL-MCNC: 165 MG/DL (ref 65–140)
GLUCOSE SERPL-MCNC: 178 MG/DL (ref 65–140)
GLUCOSE SERPL-MCNC: 179 MG/DL (ref 65–140)
GLUCOSE SERPL-MCNC: 82 MG/DL (ref 65–140)
POTASSIUM SERPL-SCNC: 4.3 MMOL/L (ref 3.5–5.3)
SODIUM SERPL-SCNC: 136 MMOL/L (ref 136–145)

## 2022-05-08 PROCEDURE — 80048 BASIC METABOLIC PNL TOTAL CA: CPT | Performed by: INTERNAL MEDICINE

## 2022-05-08 PROCEDURE — 82948 REAGENT STRIP/BLOOD GLUCOSE: CPT

## 2022-05-08 PROCEDURE — 99231 SBSQ HOSP IP/OBS SF/LOW 25: CPT | Performed by: HOSPITALIST

## 2022-05-08 RX ADMIN — ASPIRIN 81 MG: 81 TABLET, CHEWABLE ORAL at 08:32

## 2022-05-08 RX ADMIN — ENOXAPARIN SODIUM 40 MG: 40 INJECTION SUBCUTANEOUS at 08:32

## 2022-05-08 RX ADMIN — INSULIN ASPART 15 UNITS: 100 INJECTION, SUSPENSION SUBCUTANEOUS at 18:06

## 2022-05-08 RX ADMIN — INSULIN LISPRO 1 UNITS: 100 INJECTION, SOLUTION INTRAVENOUS; SUBCUTANEOUS at 08:32

## 2022-05-08 RX ADMIN — ATORVASTATIN CALCIUM 40 MG: 40 TABLET, FILM COATED ORAL at 18:05

## 2022-05-08 RX ADMIN — INSULIN LISPRO 1 UNITS: 100 INJECTION, SOLUTION INTRAVENOUS; SUBCUTANEOUS at 21:30

## 2022-05-08 RX ADMIN — INSULIN LISPRO 1 UNITS: 100 INJECTION, SOLUTION INTRAVENOUS; SUBCUTANEOUS at 12:34

## 2022-05-08 RX ADMIN — INSULIN ASPART 20 UNITS: 100 INJECTION, SUSPENSION SUBCUTANEOUS at 08:32

## 2022-05-08 RX ADMIN — CLOPIDOGREL BISULFATE 75 MG: 75 TABLET ORAL at 08:32

## 2022-05-08 NOTE — PLAN OF CARE
Problem: MOBILITY - ADULT  Goal: Maintain or return to baseline ADL function  Description: INTERVENTIONS:  -  Assess patient's ability to carry out ADLs; assess patient's baseline for ADL function and identify physical deficits which impact ability to perform ADLs (bathing, care of mouth/teeth, toileting, grooming, dressing, etc )  - Assess/evaluate cause of self-care deficits   - Assess range of motion  - Assess patient's mobility; develop plan if impaired  - Assess patient's need for assistive devices and provide as appropriate  - Encourage maximum independence but intervene and supervise when necessary  - Involve family in performance of ADLs  - Assess for home care needs following discharge   - Consider OT consult to assist with ADL evaluation and planning for discharge  - Provide patient education as appropriate  Outcome: Progressing  Goal: Maintains/Returns to pre admission functional level  Description: INTERVENTIONS:  - Perform BMAT or MOVE assessment daily    - Set and communicate daily mobility goal to care team and patient/family/caregiver  - Collaborate with rehabilitation services on mobility goals if consulted  - Perform Range of Motion 2 times a day  - Reposition patient every 2 hours    - Dangle patient  times a day  - Stand patient 3 times a day  - Ambulate patient 3 times a day  - Out of bed to chair 3 times a day   - Out of bed for meals 3 times a day  - Out of bed for toileting  - Record patient progress and toleration of activity level   Outcome: Progressing     Problem: Potential for Falls  Goal: Patient will remain free of falls  Description: INTERVENTIONS:  - Educate patient/family on patient safety including physical limitations  - Instruct patient to call for assistance with activity   - Consult OT/PT to assist with strengthening/mobility   - Keep Call bell within reach  - Keep bed low and locked with side rails adjusted as appropriate  - Keep care items and personal belongings within reach  - Initiate and maintain comfort rounds  - Make Fall Risk Sign visible to staff  - Offer Toileting every 2 Hours, in advance of need  - Initiate/Maintain bedalarm  - Obtain necessary fall risk management equipment:   - Apply yellow socks and bracelet for high fall risk patients  - Consider moving patient to room near nurses station  Outcome: Progressing     Problem: Prexisting or High Potential for Compromised Skin Integrity  Goal: Skin integrity is maintained or improved  Description: INTERVENTIONS:  - Identify patients at risk for skin breakdown  - Assess and monitor skin integrity  - Assess and monitor nutrition and hydration status  - Monitor labs   - Assess for incontinence   - Turn and reposition patient  - Assist with mobility/ambulation  - Relieve pressure over bony prominences  - Avoid friction and shearing  - Provide appropriate hygiene as needed including keeping skin clean and dry  - Evaluate need for skin moisturizer/barrier cream  - Collaborate with interdisciplinary team   - Patient/family teaching  - Consider wound care consult   Outcome: Progressing     Problem: Nutrition/Hydration-ADULT  Goal: Nutrient/Hydration intake appropriate for improving, restoring or maintaining nutritional needs  Description: Monitor and assess patient's nutrition/hydration status for malnutrition  Collaborate with interdisciplinary team and initiate plan and interventions as ordered  Monitor patient's weight and dietary intake as ordered or per policy  Utilize nutrition screening tool and intervene as necessary  Determine patient's food preferences and provide high-protein, high-caloric foods as appropriate       INTERVENTIONS:  - Monitor oral intake, urinary output, labs, and treatment plans  - Assess nutrition and hydration status and recommend course of action  - Evaluate amount of meals eaten  - Assist patient with eating if necessary   - Allow adequate time for meals  - Recommend/ encourage appropriate diets, oral nutritional supplements, and vitamin/mineral supplements  - Order, calculate, and assess calorie counts as needed  - Assess need for intravenous fluids  - Provide nutrition/hydration education as appropriate  - Include patient/family/caregiver in decisions related to nutrition  Outcome: Progressing     Problem: METABOLIC, FLUID AND ELECTROLYTES - ADULT  Goal: Electrolytes maintained within normal limits  Description: INTERVENTIONS:  - Monitor labs and assess patient for signs and symptoms of electrolyte imbalances  - Administer electrolyte replacement as ordered  - Monitor response to electrolyte replacements, including repeat lab results as appropriate  - Instruct patient on fluid and nutrition as appropriate  Outcome: Progressing  Goal: Fluid balance maintained  Description: INTERVENTIONS:  - Monitor labs   - Monitor I/O and WT  - Instruct patient on fluid and nutrition as appropriate  - Assess for signs & symptoms of volume excess or deficit  Outcome: Progressing  Goal: Glucose maintained within target range  Description: INTERVENTIONS:  - Monitor Blood Glucose as ordered  - Assess for signs and symptoms of hyperglycemia and hypoglycemia  - Administer ordered medications to maintain glucose within target range  - Assess nutritional intake and initiate nutrition service referral as needed  Outcome: Progressing     Problem: SKIN/TISSUE INTEGRITY - ADULT  Goal: Skin Integrity remains intact(Skin Breakdown Prevention)  Description: Assess:  -Perform Jorge Luis assessment every shift  -Clean and moisturize skin every   -Inspect skin when repositioning, toileting, and assisting with ADLS  -Assess under medical devices such as  every   -Assess extremities for adequate circulation and sensation     Bed Management:  -Have minimal linens on bed & keep smooth, unwrinkled  -Change linens as needed when moist or perspiring  -Avoid sitting or lying in one position for more than 2 hours while in bed  -Keep HOB at 30degrees Toileting:  -Offer bedside commode  -Assess for incontinence every 2hrs  -Use incontinent care products after each incontinent episode such as     Activity:  -Mobilize patient  times a day  -Encourage activity and walks on unit  -Encourage or provide ROM exercises   -Turn and reposition patient every  Hours  -Use appropriate equipment to lift or move patient in bed  -Instruct/ Assist with weight shifting every  when out of bed in chair  -Consider limitation of chair time  hour intervals    Skin Care:  -Avoid use of baby powder, tape, friction and shearing, hot water or constrictive clothing  -Relieve pressure over bony prominences using   -Do not massage red bony areas    Next Steps:  -Teach patient strategies to minimize risks such as    -Consider consults to  interdisciplinary teams such as   Outcome: Progressing  Goal: Incision(s), wounds(s) or drain site(s) healing without S/S of infection  Description: INTERVENTIONS  - Assess and document dressing, incision, wound bed, drain sites and surrounding tissue  - Provide patient and family education  - Perform skin care/dressing changes every   Outcome: Progressing  Goal: Pressure injury heals and does not worsen  Description: Interventions:  - Implement low air loss mattress or specialty surface (Criteria met)  - Apply silicone foam dressing  - Instruct/assist with weight shifting every  minutes when in chair   - Limit chair time to  hour intervals  - Use special pressure reducing interventions such as  when in chair   - Apply fecal or urinary incontinence containment device   - Perform passive or active ROM every   - Turn and reposition patient & offload bony prominences every  hours   - Utilize friction reducing device or surface for transfers   - Consider consults to  interdisciplinary teams such as   - Use incontinent care products after each incontinent episode such as onsider nutrition services referral as needed  Outcome: Progressing     Problem: MUSCULOSKELETAL - ADULT  Goal: Maintain or return mobility to safest level of function  Description: INTERVENTIONS:  - Assess patient's ability to carry out ADLs; assess patient's baseline for ADL function and identify physical deficits which impact ability to perform ADLs (bathing, care of mouth/teeth, toileting, grooming, dressing, etc )  - Assess/evaluate cause of self-care deficits   - Assess range of motion  - Assess patient's mobility  - Assess patient's need for assistive devices and provide as appropriate  - Encourage maximum independence but intervene and supervise when necessary  - Involve family in performance of ADLs  - Assess for home care needs following discharge   - Consider OT consult to assist with ADL evaluation and planning for discharge  - Provide patient education as appropriate  Outcome: Progressing  Goal: Maintain proper alignment of affected body part  Description: INTERVENTIONS:  - Support, maintain and protect limb and body alignment  - Provide patient/ family with appropriate education  Outcome: Progressing

## 2022-05-08 NOTE — ASSESSMENT & PLAN NOTE
Son reports increased drowsiness, dizziness, inability to walk, and left sided weakness ("she won't move her left foot" and "her left hand is weak") starting Saturday  Due to underlying dementia and agitation ROS or history is unable to be obtained from patient  Son reports patient was at her normal ambulatory baseline Friday (walker), then noticed complete inability to ambulate Saturday  · ED note mentions right arm weakness, however patient not currently able to cooperate with full physical exam  · Not hypoglycemic; has mild hyponatremia as below without other major electrolyte disturbance  · CTA without evidence for acute infarct, bleed, or mass effect, however is showing occlusion vs severe stenosis of the right posterior cerebral artery and 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  · Status post Discontinue Tele monitoring right now, permissive HTN and neuro checks per stroke protocol, daily ASA and atorvastatin  · Neurology consulted and their input is as follows    Per Dr Julita Coreas from Neurology    · Initial CT scan of the head was unremarkable, CTA of the head and neck showed severe stenosis/occlusion of the right PCA, with 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, her labs revealed significant hyperlipidemia, and on examination patient had subtle left-sided weakness with neglect and field cut to suspect a right PCA infarct  Patient admitted on stroke pathway, MRI of the brain done subsequently confirms the presence of a right PCA infarct of recent duration, patient started on dual anti-platelet therapy,/statin therapy will recommend adequate blood pressure and, blood sugar control and patient may need inpatient rehab  At baseline she also suffers from dementia late onset Alzheimer's type and hence recommend delirium precautions     · As per neurologist, patient will be on dual anti-platelet therapy with aspirin 81 mg once a day as well as Plavix 75 mg once a day for 21 days, followed by aspirin monotherapy  Continue atorvastatin  Goal is now normotension  Avoid hypotension  Neurologist is okay with discharge of the patient  · Presently waiting for rehabilitation facility placement, for further PT/OT

## 2022-05-08 NOTE — PLAN OF CARE
Problem: MOBILITY - ADULT  Goal: Maintain or return to baseline ADL function  Description: INTERVENTIONS:  -  Assess patient's ability to carry out ADLs; assess patient's baseline for ADL function and identify physical deficits which impact ability to perform ADLs (bathing, care of mouth/teeth, toileting, grooming, dressing, etc )  - Assess/evaluate cause of self-care deficits   - Assess range of motion  - Assess patient's mobility; develop plan if impaired  - Assess patient's need for assistive devices and provide as appropriate  - Encourage maximum independence but intervene and supervise when necessary  - Involve family in performance of ADLs  - Assess for home care needs following discharge   - Consider OT consult to assist with ADL evaluation and planning for discharge  - Provide patient education as appropriate  Outcome: Not Progressing  Goal: Maintains/Returns to pre admission functional level  Description: INTERVENTIONS:  - Perform BMAT or MOVE assessment daily    - Set and communicate daily mobility goal to care team and patient/family/caregiver  - Collaborate with rehabilitation services on mobility goals if consulted  - Perform Range of Motion  times a day  - Reposition patient every  hours    - Dangle patient  times a day  - Stand patient  times a day  - Ambulate patient  times a day  - Out of bed to chair  times a day   - Out of bed for meals  times a day  - Out of bed for toileting  - Record patient progress and toleration of activity level   Outcome: Not Progressing     Problem: Potential for Falls  Goal: Patient will remain free of falls  Description: INTERVENTIONS:  - Educate patient/family on patient safety including physical limitations  - Instruct patient to call for assistance with activity   - Consult OT/PT to assist with strengthening/mobility   - Keep Call bell within reach  - Keep bed low and locked with side rails adjusted as appropriate  - Keep care items and personal belongings within reach  - Initiate and maintain comfort rounds  - Make Fall Risk Sign visible to staff  - Offer Toileting every  Hours, in advance of need  - Initiate/Maintain alarm  - Obtain necessary fall risk management equipment:   - Apply yellow socks and bracelet for high fall risk patients  - Consider moving patient to room near nurses station  Outcome: Progressing     Problem: Prexisting or High Potential for Compromised Skin Integrity  Goal: Skin integrity is maintained or improved  Description: INTERVENTIONS:  - Identify patients at risk for skin breakdown  - Assess and monitor skin integrity  - Assess and monitor nutrition and hydration status  - Monitor labs   - Assess for incontinence   - Turn and reposition patient  - Assist with mobility/ambulation  - Relieve pressure over bony prominences  - Avoid friction and shearing  - Provide appropriate hygiene as needed including keeping skin clean and dry  - Evaluate need for skin moisturizer/barrier cream  - Collaborate with interdisciplinary team   - Patient/family teaching  - Consider wound care consult   Outcome: Progressing     Problem: Nutrition/Hydration-ADULT  Goal: Nutrient/Hydration intake appropriate for improving, restoring or maintaining nutritional needs  Description: Monitor and assess patient's nutrition/hydration status for malnutrition  Collaborate with interdisciplinary team and initiate plan and interventions as ordered  Monitor patient's weight and dietary intake as ordered or per policy  Utilize nutrition screening tool and intervene as necessary  Determine patient's food preferences and provide high-protein, high-caloric foods as appropriate       INTERVENTIONS:  - Monitor oral intake, urinary output, labs, and treatment plans  - Assess nutrition and hydration status and recommend course of action  - Evaluate amount of meals eaten  - Assist patient with eating if necessary   - Allow adequate time for meals  - Recommend/ encourage appropriate diets, oral nutritional supplements, and vitamin/mineral supplements  - Order, calculate, and assess calorie counts as needed  - Assess need for intravenous fluids  - Provide nutrition/hydration education as appropriate  - Include patient/family/caregiver in decisions related to nutrition  Outcome: Progressing     Problem: METABOLIC, FLUID AND ELECTROLYTES - ADULT  Goal: Electrolytes maintained within normal limits  Description: INTERVENTIONS:  - Monitor labs and assess patient for signs and symptoms of electrolyte imbalances  - Administer electrolyte replacement as ordered  - Monitor response to electrolyte replacements, including repeat lab results as appropriate  - Instruct patient on fluid and nutrition as appropriate  Outcome: Progressing  Goal: Fluid balance maintained  Description: INTERVENTIONS:  - Monitor labs   - Monitor I/O and WT  - Instruct patient on fluid and nutrition as appropriate  - Assess for signs & symptoms of volume excess or deficit  Outcome: Progressing  Goal: Glucose maintained within target range  Description: INTERVENTIONS:  - Monitor Blood Glucose as ordered  - Assess for signs and symptoms of hyperglycemia and hypoglycemia  - Administer ordered medications to maintain glucose within target range  - Assess nutritional intake and initiate nutrition service referral as needed  Outcome: Progressing     Problem: SKIN/TISSUE INTEGRITY - ADULT  Goal: Skin Integrity remains intact(Skin Breakdown Prevention)  Description: Assess:  -Perform Jorge Luis assessment every   -Clean and moisturize skin every   -Inspect skin when repositioning, toileting, and assisting with ADLS  -Assess under medical devices such as  every   -Assess extremities for adequate circulation and sensation     Bed Management:  -Have minimal linens on bed & keep smooth, unwrinkled  -Change linens as needed when moist or perspiring  -Avoid sitting or lying in one position for more than  hours while in bed  -Keep HOB at degrees Toileting:  -Offer bedside commode  -Assess for incontinence every   -Use incontinent care products after each incontinent episode such as     Activity:  -Mobilize patient  times a day  -Encourage activity and walks on unit  -Encourage or provide ROM exercises   -Turn and reposition patient every  Hours  -Use appropriate equipment to lift or move patient in bed  -Instruct/ Assist with weight shifting every  when out of bed in chair  -Consider limitation of chair time  hour intervals    Skin Care:  -Avoid use of baby powder, tape, friction and shearing, hot water or constrictive clothing  -Relieve pressure over bony prominences using   -Do not massage red bony areas    Next Steps:  -Teach patient strategies to minimize risks such as    -Consider consults to  interdisciplinary teams such as   Outcome: Progressing  Goal: Incision(s), wounds(s) or drain site(s) healing without S/S of infection  Description: INTERVENTIONS  - Assess and document dressing, incision, wound bed, drain sites and surrounding tissue  - Provide patient and family education  - Perform skin care/dressing changes every   Outcome: Progressing  Goal: Pressure injury heals and does not worsen  Description: Interventions:  - Implement low air loss mattress or specialty surface (Criteria met)  - Apply silicone foam dressing  - Instruct/assist with weight shifting every  minutes when in chair   - Limit chair time to  hour intervals  - Use special pressure reducing interventions such as  when in chair   - Apply fecal or urinary incontinence containment device   - Perform passive or active ROM every   - Turn and reposition patient & offload bony prominences every  hours   - Utilize friction reducing device or surface for transfers   - Consider consults to  interdisciplinary teams such as   - Use incontinent care products after each incontinent episode such as  - Consider nutrition services referral as needed  Outcome: Progressing     Problem: MUSCULOSKELETAL - ADULT  Goal: Maintain or return mobility to safest level of function  Description: INTERVENTIONS:  - Assess patient's ability to carry out ADLs; assess patient's baseline for ADL function and identify physical deficits which impact ability to perform ADLs (bathing, care of mouth/teeth, toileting, grooming, dressing, etc )  - Assess/evaluate cause of self-care deficits   - Assess range of motion  - Assess patient's mobility  - Assess patient's need for assistive devices and provide as appropriate  - Encourage maximum independence but intervene and supervise when necessary  - Involve family in performance of ADLs  - Assess for home care needs following discharge   - Consider OT consult to assist with ADL evaluation and planning for discharge  - Provide patient education as appropriate  Outcome: Progressing  Goal: Maintain proper alignment of affected body part  Description: INTERVENTIONS:  - Support, maintain and protect limb and body alignment  - Provide patient/ family with appropriate education  Outcome: Progressing

## 2022-05-08 NOTE — ASSESSMENT & PLAN NOTE
· Resolved  · Sodium 134- now stable at 136  · Status post IV fluids    · Periodically monitoring of BMP    Results from last 7 days   Lab Units 05/08/22  0612 05/04/22  0448 05/02/22  1853   SODIUM mmol/L 136 138 134*   POTASSIUM mmol/L 4 3 4 4 3 7   CHLORIDE mmol/L 102 104 99*   CO2 mmol/L 27 25 25   ANION GAP mmol/L 7 9 10   BUN mg/dL 19 17 20   CREATININE mg/dL 0 88 0 87 0 99   CALCIUM mg/dL 8 7 8 7 8 8   ALBUMIN g/dL  --   --  3 2*   TOTAL BILIRUBIN mg/dL  --   --  0 46   ALK PHOS U/L  --   --  132*   ALT U/L  --   --  24   AST U/L  --   --  26   EGFR ml/min/1 73sq m 57 58 49   GLUCOSE RANDOM mg/dL 142* 182* 152*

## 2022-05-08 NOTE — PROGRESS NOTES
[address] [facility]  Progress Note - Chetna Amado 8/30/1930, 80 y o  female MRN: [id number]  Unit/Bed#: -Lora Encounter: [id number]  Primary Care Provider: Dl Lewis MD   Date and time admitted to hospital: 5/2/2022  8:03 PM    Hyponatremia  Assessment & Plan  · Resolved  · Sodium 134- now stable at 136  · Status post IV fluids  · Periodically monitoring of BMP    Results from last 7 days   Lab Units 05/08/22  0612 05/04/22  0448 05/02/22  1853   SODIUM mmol/L 136 138 134*   POTASSIUM mmol/L 4 3 4 4 3 7   CHLORIDE mmol/L 102 104 99*   CO2 mmol/L 27 25 25   ANION GAP mmol/L 7 9 10   BUN mg/dL 19 17 20   CREATININE mg/dL 0 88 0 87 0 99   CALCIUM mg/dL 8 7 8 7 8 8   ALBUMIN g/dL  --   --  3 2*   TOTAL BILIRUBIN mg/dL  --   --  0 46   ALK PHOS U/L  --   --  132*   ALT U/L  --   --  24   AST U/L  --   --  26   EGFR ml/min/1 73sq m 57 58 49   GLUCOSE RANDOM mg/dL 142* 182* 152*         Urinary frequency  Assessment & Plan  · ED note reports increasing urinary frequency since Friday without fever  · Son feels this increased frequency was actually agitation with patient repeatedly trying to get up and walk around at home  · Due to dementia as above patient is unable to provide detailed history  · No evidence of any urine infection  Late onset Alzheimer's disease without behavioral disturbance ([facility] )  Assessment & Plan  · Had episodes of confusion, not able to answer questions properly but compared to admission the agitation has significantly decreased  · Today able to answer simple questions    · Son reports patient is at mental baseline  · Status post one-time dose IM zyprexa and utilize soft restraint belt to avoid fall at this time  · Son is looking for short term rehab or nursing home placement due to difficulty taking care of the patient at home  · Delirium precautions  · Case management consulted, as patient will need placement  · No need of any restraints or one-to-one observation at this time  · As per case management, patient will be accepted to the rehabilitation facility, once without physical or chemical restraints for at least 24 hours  As per patient's nurse, no physical or chemical restraints done more than 24 hours now  However, according to case management, no accepting facility yet at this point, as it is hard to get an accepting facility as patient is still unvaccinated for the COVID-19  This was discussed with the patient's son and he is okay with his mother having her 1st COVID-19 vaccine here (in fact, he has been telling me that he has been asking for this even during her admission)  As per case management, usually, patient should be fully vaccinated (at least 2 shots), to be easier to get a facility, and this was also discussed with the patient's son  Okay with the 1st COVID 19 vaccine here  Patient was told about this and okay with this plan  Chronic diastolic congestive heart failure (HCC)  Assessment & Plan  Wt Readings from Last 3 Encounters:   05/03/22 57 2 kg (126 lb)   11/21/20 57 6 kg (127 lb)   05/31/20 61 9 kg (136 lb 7 4 oz)     · History of congestive heart failure noted in the patient's chart  · No previous ECHO results able to be found  · Echocardiogram done on this admission:  Ejection fraction is 60%  Wall motion is normal   Moderate aortic valve stenosis  Mild mitral valve regurgitation  Mild tricuspid valve regurgitation  · Has elevated   · No LE edema appreciated on physical exam  · Is not prescribed outpatient diuretic or HTN medication  · Has weakness/stroke-like symptoms as above  · Does not appear to be in overload  No need of intravenous diuresis  · Continue monitoring and consider IV diuresis if s/s of volume overload become apparent        Type 2 diabetes mellitus without complication, with long-term current use of insulin [facility])  Assessment & Plan  Lab Results   Component Value Date    HGBA1C 8 2 (H) 05/03/2022     · Presently, blood sugars are at acceptable levels  · Continue NovoLog 70/30 20 units in the morning and 15 units in the evening  · Correctional SSI  · Hypoglycemia protocol; diabetic diet ordered  · Adjust treatment accordingly  * Cerebrovascular accident (CVA) due to stenosis of right posterior cerebral artery [facility])  Assessment & Plan  Son reports increased drowsiness, dizziness, inability to walk, and left sided weakness ("she won't move her left foot" and "her left hand is weak") starting Saturday  Due to underlying dementia and agitation ROS or history is unable to be obtained from patient  Son reports patient was at her normal ambulatory baseline Friday (walker), then noticed complete inability to ambulate Saturday  · ED note mentions right arm weakness, however patient not currently able to cooperate with full physical exam  · Not hypoglycemic; has mild hyponatremia as below without other major electrolyte disturbance  · CTA without evidence for acute infarct, bleed, or mass effect, however is showing occlusion vs severe stenosis of the right posterior cerebral artery and 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  · Status post Discontinue Tele monitoring right now, permissive HTN and neuro checks per stroke protocol, daily ASA and atorvastatin  · Neurology consulted and their input is as follows    Per Dr Pat Rutledge from Neurology    · Initial CT scan of the head was unremarkable, CTA of the head and neck showed severe stenosis/occlusion of the right PCA, with 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  · As per neurologist, patient will be on dual anti-platelet therapy with aspirin 81 mg once a day as well as Plavix 75 mg once a day for 21 days, followed by aspirin monotherapy  Continue atorvastatin  Goal is now normotension  Avoid hypotension  Neurologist is okay with discharge of the patient  · Presently waiting for rehabilitation facility placement, for further PT/OT  VTE  Prophylaxis:   Pharmacologic: in place    Patient Centered Rounds: I have performed bedside rounds with nursing staff today  Discussions with Specialists or Other Care Team Provider: case management    Education and Discussions with Family / Patient: pt      Current Length of Stay: 6 day(s)    Current Patient Status: Inpatient        Code Status: Level 3 - DNAR and DNI      Subjective:     Denies chest pain, shortness of breath   No other concerns    Patient is seen and examined at bedside  All other ROS are negative  Objective:     Vitals:   Temp (24hrs), Av 2 °F (36 8 °C), Min:97 9 °F (36 6 °C), Max:98 5 °F (36 9 °C)    Temp:  [97 9 °F (36 6 °C)-98 5 °F (36 9 °C)] 97 9 °F (36 6 °C)  HR:  [42-68] 68  Resp:  [16] 16  BP: (125-137)/(68-73) 137/73  SpO2:  [93 %-95 %] 95 %  Body mass index is 24 61 kg/m²       Input and Output Summary (last 24 hours):     No intake or output data in the 24 hours ending 22 1444    Physical Exam:       GEN: No acute distress, comfortable, elderly, chronically ill  HEEENT: No JVD, PERRLA, no scleral icterus  RESP: Lungs clear to auscultation bilaterally  CV: RRR, +s1/s2   ABD: SOFT NON TENDER, POSITIVE BOWEL SOUNDS, NO DISTENTION  PSYCH: apparent dementia  NEURO:  NO FOCAL DEFICITS  SKIN: NO RASH  EXTREM: NO EDEMA    Additional Data:     Labs:    Results from last 7 days Lab Units 05/04/22  0448 05/02/22  1853 05/02/22  1853   WBC Thousand/uL 9 28   < > 10 81*   HEMOGLOBIN g/dL 12 8   < > 13 1   HEMATOCRIT % 39 9   < > 38 6   PLATELETS Thousands/uL 178   < > 206   NEUTROS PCT %  --   --  78*   LYMPHS PCT %  --   --  13*   MONOS PCT %  --   --  7   EOS PCT %  --   --  2    < > = values in this interval not displayed  Results from last 7 days   Lab Units 05/08/22  0612 05/04/22  0448 05/02/22  1853   SODIUM mmol/L 136   < > 134*   POTASSIUM mmol/L 4 3   < > 3 7   CHLORIDE mmol/L 102   < > 99*   CO2 mmol/L 27   < > 25   BUN mg/dL 19   < > 20   CREATININE mg/dL 0 88   < > 0 99   ANION GAP mmol/L 7   < > 10   CALCIUM mg/dL 8 7   < > 8 8   ALBUMIN g/dL  --   --  3 2*   TOTAL BILIRUBIN mg/dL  --   --  0 46   ALK PHOS U/L  --   --  132*   ALT U/L  --   --  24   AST U/L  --   --  26   GLUCOSE RANDOM mg/dL 142*   < > 152*    < > = values in this interval not displayed  Results from last 7 days   Lab Units 05/08/22  1049 05/08/22  0719 05/07/22 2011 05/07/22  1626 05/07/22  1129 05/07/22  0749 05/06/22  2106 05/06/22  1551 05/06/22  1106 05/06/22  0713 05/05/22  2036 05/05/22  1610   POC GLUCOSE mg/dl 178* 165* 265* 118 155* 151* 159* 146* 194* 181* 185* 144*     Results from last 7 days   Lab Units 05/03/22  0544   HEMOGLOBIN A1C % 8 2*               * I Have Reviewed All Lab Data Listed Above  Imaging:     Results for orders placed during the hospital encounter of 05/02/22    XR chest 1 view portable    Narrative  CHEST    INDICATION:   Generalized weakness  COMPARISON:  None    EXAM PERFORMED/VIEWS:  XR CHEST PORTABLE      FINDINGS:  Battery device is projected over the left lower chest     Cardiomediastinal silhouette appears unremarkable  The lungs are clear  No pneumothorax or pleural effusion  Osseous structures appear within normal limits for patient age  Impression  No acute cardiopulmonary disease              Workstation performed: [id number]    No results found for this or any previous visit  *I have reviewed all imaging reports listed above      Recent Cultures (last 7 days):           Last 24 Hours Medication List:   Current Facility-Administered Medications   Medication Dose Route Frequency Provider Last Rate    aspirin  81 mg Oral Daily Lorri Sawyer [place], [place]      atorvastatin  40 mg Oral QPM Lorri Sawyer [place], [place]      clopidogrel  75 mg Oral Daily BRIAN Rosen      enoxaparin  40 mg Subcutaneous Daily Marianna Epstein PA-C      insulin aspart protamine-insulin aspart  15 Units Subcutaneous QPM Jacinto Meade MD      insulin aspart protamine-insulin aspart  20 Units Subcutaneous QAM Jacinto Meade MD      insulin lispro  1-5 Units Subcutaneous TID AC Lorri Kevin, [place]      insulin lispro  1-5 Units Subcutaneous HS Lorri Arshad PA-C      OLANZapine  5 mg Oral HS PRN Jacinto Meade MD          Today, Patient Was Seen By: Fawn Zheng MD    ** Please Note: Dictation voice to text software may have been used in the creation of this document   **

## 2022-05-09 LAB
GLUCOSE SERPL-MCNC: 130 MG/DL (ref 65–140)
GLUCOSE SERPL-MCNC: 185 MG/DL (ref 65–140)
GLUCOSE SERPL-MCNC: 205 MG/DL (ref 65–140)
GLUCOSE SERPL-MCNC: 75 MG/DL (ref 65–140)

## 2022-05-09 PROCEDURE — 82948 REAGENT STRIP/BLOOD GLUCOSE: CPT

## 2022-05-09 PROCEDURE — 99231 SBSQ HOSP IP/OBS SF/LOW 25: CPT | Performed by: FAMILY MEDICINE

## 2022-05-09 RX ADMIN — ATORVASTATIN CALCIUM 40 MG: 40 TABLET, FILM COATED ORAL at 17:51

## 2022-05-09 RX ADMIN — CLOPIDOGREL BISULFATE 75 MG: 75 TABLET ORAL at 08:37

## 2022-05-09 RX ADMIN — INSULIN ASPART 15 UNITS: 100 INJECTION, SUSPENSION SUBCUTANEOUS at 17:51

## 2022-05-09 RX ADMIN — INSULIN ASPART 20 UNITS: 100 INJECTION, SUSPENSION SUBCUTANEOUS at 08:37

## 2022-05-09 RX ADMIN — ENOXAPARIN SODIUM 40 MG: 40 INJECTION SUBCUTANEOUS at 08:37

## 2022-05-09 RX ADMIN — INSULIN LISPRO 1 UNITS: 100 INJECTION, SOLUTION INTRAVENOUS; SUBCUTANEOUS at 08:37

## 2022-05-09 RX ADMIN — INSULIN LISPRO 1 UNITS: 100 INJECTION, SOLUTION INTRAVENOUS; SUBCUTANEOUS at 12:30

## 2022-05-09 RX ADMIN — ASPIRIN 81 MG: 81 TABLET, CHEWABLE ORAL at 08:37

## 2022-05-09 NOTE — PROGRESS NOTES
[address] [facility]  Progress Note - Jame Brooks 8/30/1930, 80 y o  female MRN: [id number]  Unit/Bed#: -Lora Encounter: [id number]  Primary Care Provider: Grace Barlow MD   Date and time admitted to hospital: 5/2/2022  8:03 PM    * Cerebrovascular accident (CVA) due to stenosis of right posterior cerebral artery [facility])  Assessment & Plan  Son reports increased drowsiness, dizziness, inability to walk, and left sided weakness ("she won't move her left foot" and "her left hand is weak") starting Saturday  Due to underlying dementia and agitation ROS or history is unable to be obtained from patient  Son reports patient was at her normal ambulatory baseline Friday (walker), then noticed complete inability to ambulate Saturday  · ED note mentions right arm weakness, however patient not currently able to cooperate with full physical exam  · Not hypoglycemic; has mild hyponatremia as below without other major electrolyte disturbance  · CTA without evidence for acute infarct, bleed, or mass effect, however is showing occlusion vs severe stenosis of the right posterior cerebral artery and 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  · Status post Discontinue Tele monitoring right now, permissive HTN and neuro checks per stroke protocol, daily ASA and atorvastatin  · Neurology consulted and their input is as follows    Per Dr Donte Cardona from Neurology    · Initial CT scan of the head was unremarkable, CTA of the head and neck showed severe stenosis/occlusion of the right PCA, with 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  · As per neurologist, patient will be on dual anti-platelet therapy with aspirin 81 mg once a day as well as Plavix 75 mg once a day for 21 days, followed by aspirin monotherapy  Continue atorvastatin  Goal is now normotension  Avoid hypotension  Neurologist is okay with discharge of the patient  · Presently waiting for rehabilitation facility placement, for further PT/OT  Hyponatremia  Assessment & Plan  Resolved    Chronic diastolic congestive heart failure [facility])  Assessment & Plan  Wt Readings from Last 3 Encounters:   05/03/22 57 2 kg (126 lb)   11/21/20 57 6 kg (127 lb)   05/31/20 61 9 kg (136 lb 7 4 oz)       · Echocardiogram done on this admission:  Ejection fraction is 60%  Wall motion is normal   Moderate aortic valve stenosis  Mild mitral valve regurgitation  Mild tricuspid valve regurgitation  · Appears euvolemic on exam      Type 2 diabetes mellitus without complication, with long-term current use of insulin ([facility])  Assessment & Plan  Lab Results   Component Value Date    HGBA1C 8 2 (H) 05/03/2022     · Presently, blood sugars are at acceptable levels  · Continue NovoLog 70/30 20 units in the morning and 15 units in the evening  · Correctional SSI  · Hypoglycemia protocol; diabetic diet ordered  · Adjust treatment accordingly  Progress Note - Verner Lions 80 y o  female MRN: [id number]    Unit/Bed#: -01 Encounter: [id number]        Subjective:   Patient seen and examined, no acute complaints or events overnight     Objective:     Vitals:   Vitals:    05/09/22 0700   BP: 139/72   Pulse: 84   Resp: 16   Temp: 99 2 °F (37 3 °C)   SpO2: 94%     Body mass index is 24 61 kg/m²      Intake/Output Summary (Last 24 hours) at 5/9/2022 1013  Last data filed at 5/8/2022 1745  Gross per 24 hour   Intake 240 ml   Output --   Net 240 ml       Physical Exam:   /72 (BP Location: Left arm)   Pulse 84   Temp 99 2 °F (37 3 °C) (Axillary)   Resp 16   Ht 5' (1 524 m)   Wt 57 2 kg (126 lb)   SpO2 94%   BMI 24 61 kg/m²   General appearance: alert  Lungs: diminished  Heart: regular rate and rhythm, S1, S2 normal, no murmur, click, rub or gallop  Abdomen: soft, non-tender; bowel sounds normal; no masses,  no organomegaly  Extremities: no edema  Skin no rash   Pulses: 2+ and symmetric     Invasive Devices  Report    Peripheral Intravenous Line            Peripheral IV 05/05/22 Dorsal (posterior); Right Forearm 4 days                Results from last 7 days   Lab Units 05/04/22  0448 05/02/22  1853   WBC Thousand/uL 9 28 10 81*   HEMOGLOBIN g/dL 12 8 13 1   HEMATOCRIT % 39 9 38 6   PLATELETS Thousands/uL 178 206       Results from last 7 days   Lab Units 05/08/22  0612 05/04/22  0448 05/02/22  1853   POTASSIUM mmol/L 4 3 4 4 3 7   CHLORIDE mmol/L 102 104 99*   CO2 mmol/L 27 25 25   BUN mg/dL 19 17 20   CREATININE mg/dL 0 88 0 87 0 99   CALCIUM mg/dL 8 7 8 7 8 8   ALK PHOS U/L  --   --  132*   ALT U/L  --   --  24   AST U/L  --   --  26       Medication Administration - last 24 hours from 05/08/2022 1013 to 05/09/2022 1013       Date/Time Order Dose Route Action Action by     05/08/2022 1805 atorvastatin (LIPITOR) tablet 40 mg 40 mg Oral Given Tammy Guy RN     05/09/2022 7549 aspirin chewable tablet 81 mg 81 mg Oral Given Peggy Mcmillan RN     05/09/2022 0837 enoxaparin (LOVENOX) subcutaneous injection 40 mg 40 mg Subcutaneous Given Peggy Mcmillan RN     05/09/2022 0837 insulin lispro (HumaLOG) 100 units/mL subcutaneous injection 1-5 Units 1 Units Subcutaneous Given Peggy Mcmillan RN     05/08/2022 1806 insulin lispro (HumaLOG) 100 units/mL subcutaneous injection 1-5 Units 1 Units Subcutaneous Not Given Tammy Guy, RN     05/08/2022 1234 insulin lispro (HumaLOG) 100 units/mL subcutaneous injection 1-5 Units 1 Units Subcutaneous Given Tammy Guy RN     05/08/2022 2130 insulin lispro (HumaLOG) 100 units/mL subcutaneous injection 1-5 Units 1 Units Subcutaneous Given Torie Gonzalze RN     05/09/2022 3998 clopidogrel (PLAVIX) tablet 75 mg 75 mg Oral Given Ruben Sawyer RN     05/09/2022 4024 insulin aspart protamine-insulin aspart (NovoLOG 70/30) 100 units/mL subcutaneous injection 20 Units 20 Units Subcutaneous Given Ruben Sawyer RN     05/08/2022 1806 insulin aspart protamine-insulin aspart (NovoLOG 70/30) 100 units/mL subcutaneous injection 15 Units 15 Units Subcutaneous Given Tammy Guy RN            Lab, Imaging and other studies: I have personally reviewed pertinent reports      VTE Pharmacologic Prophylaxis: Enoxaparin (Lovenox)  VTE Mechanical Prophylaxis: sequential compression device     Jeannie Zhang MD  5/9/2022,10:13 AM

## 2022-05-09 NOTE — CASE MANAGEMENT
Case Management Discharge Planning Note    Patient name Constance Gowers  Location [address] [id number]/-[id number] MRN [id number]  : 1930 Date 2022       Current Admission Date: 2022  Current Admission Diagnosis:Cerebrovascular accident (CVA) due to stenosis of right posterior cerebral artery [facility])   Patient Active Problem List    Diagnosis Date Noted    Hyponatremia 2022    Cerebrovascular accident (CVA) due to stenosis of right posterior cerebral artery ([facility] [address] ) 2022    Urinary frequency 2022    Pressure injury of left heel, unstageable ([facility] ) 2020    Syncope 10/25/2019    Pressure injury of left buttock, stage 1 10/25/2019    Late onset [facility] without [facility] ) 10/25/2019    Need for immunization against influenza 2018    Type 2 diabetes mellitus without complication, with long-term current use of insulin ([facility] ) 2018    Chronic diastolic congestive heart failure ([facility] ) 2018    Aortic valve disorder 2018      LOS (days): 7  Geometric Mean LOS (GMLOS) (days): 3 00  Days to GMLOS:-3 4     OBJECTIVE:  Risk of Unplanned Readmission Score: 16         Current admission status: Inpatient   Preferred Pharmacy:   [address] [address]  Phone: [phone] Fax: [phone]    Primary Care Provider: Martina Hernandez MD    Primary Insurance: MEDICARE  Secondary Insurance: [facility]    DISCHARGE DETAILS    Other Referral/Resources/Interventions Provided:  Referral Comments: Patient was accepted at [facility]  Will follow

## 2022-05-09 NOTE — PLAN OF CARE
Problem: MOBILITY - ADULT  Goal: Maintain or return to baseline ADL function  Description: INTERVENTIONS:  -  Assess patient's ability to carry out ADLs; assess patient's baseline for ADL function and identify physical deficits which impact ability to perform ADLs (bathing, care of mouth/teeth, toileting, grooming, dressing, etc )  - Assess/evaluate cause of self-care deficits   - Assess range of motion  - Assess patient's mobility; develop plan if impaired  - Assess patient's need for assistive devices and provide as appropriate  - Encourage maximum independence but intervene and supervise when necessary  - Involve family in performance of ADLs  - Assess for home care needs following discharge   - Consider OT consult to assist with ADL evaluation and planning for discharge  - Provide patient education as appropriate  Outcome: Progressing  Goal: Maintains/Returns to pre admission functional level  Description: INTERVENTIONS:  - Perform BMAT or MOVE assessment daily    - Set and communicate daily mobility goal to care team and patient/family/caregiver  - Collaborate with rehabilitation services on mobility goals if consulted  - Perform Range of Motion  times a day  - Reposition patient every  hours    - Dangle patient  times a day  - Stand patient  times a day  - Ambulate patient  times a day  - Out of bed to chair  times a day   - Out of bed for meals  times a day  - Out of bed for toileting  - Record patient progress and toleration of activity level   Outcome: Progressing     Problem: Potential for Falls  Goal: Patient will remain free of falls  Description: INTERVENTIONS:  - Educate patient/family on patient safety including physical limitations  - Instruct patient to call for assistance with activity   - Consult OT/PT to assist with strengthening/mobility   - Keep Call bell within reach  - Keep bed low and locked with side rails adjusted as appropriate  - Keep care items and personal belongings within reach  - Initiate and maintain comfort rounds  - Make Fall Risk Sign visible to staff  - Offer Toileting every  Hours, in advance of need  - Initiate/Maintain alarm  - Obtain necessary fall risk management equipment:   - Apply yellow socks and bracelet for high fall risk patients  - Consider moving patient to room near nurses station  Outcome: Progressing     Problem: Prexisting or High Potential for Compromised Skin Integrity  Goal: Skin integrity is maintained or improved  Description: INTERVENTIONS:  - Identify patients at risk for skin breakdown  - Assess and monitor skin integrity  - Assess and monitor nutrition and hydration status  - Monitor labs   - Assess for incontinence   - Turn and reposition patient  - Assist with mobility/ambulation  - Relieve pressure over bony prominences  - Avoid friction and shearing  - Provide appropriate hygiene as needed including keeping skin clean and dry  - Evaluate need for skin moisturizer/barrier cream  - Collaborate with interdisciplinary team   - Patient/family teaching  - Consider wound care consult   Outcome: Progressing     Problem: Nutrition/Hydration-ADULT  Goal: Nutrient/Hydration intake appropriate for improving, restoring or maintaining nutritional needs  Description: Monitor and assess patient's nutrition/hydration status for malnutrition  Collaborate with interdisciplinary team and initiate plan and interventions as ordered  Monitor patient's weight and dietary intake as ordered or per policy  Utilize nutrition screening tool and intervene as necessary  Determine patient's food preferences and provide high-protein, high-caloric foods as appropriate       INTERVENTIONS:  - Monitor oral intake, urinary output, labs, and treatment plans  - Assess nutrition and hydration status and recommend course of action  - Evaluate amount of meals eaten  - Assist patient with eating if necessary   - Allow adequate time for meals  - Recommend/ encourage appropriate diets, oral nutritional supplements, and vitamin/mineral supplements  - Order, calculate, and assess calorie counts as needed  - Assess need for intravenous fluids  - Provide nutrition/hydration education as appropriate  - Include patient/family/caregiver in decisions related to nutrition  Outcome: Progressing     Problem: METABOLIC, FLUID AND ELECTROLYTES - ADULT  Goal: Electrolytes maintained within normal limits  Description: INTERVENTIONS:  - Monitor labs and assess patient for signs and symptoms of electrolyte imbalances  - Administer electrolyte replacement as ordered  - Monitor response to electrolyte replacements, including repeat lab results as appropriate  - Instruct patient on fluid and nutrition as appropriate  Outcome: Progressing  Goal: Fluid balance maintained  Description: INTERVENTIONS:  - Monitor labs   - Monitor I/O and WT  - Instruct patient on fluid and nutrition as appropriate  - Assess for signs & symptoms of volume excess or deficit  Outcome: Progressing  Goal: Glucose maintained within target range  Description: INTERVENTIONS:  - Monitor Blood Glucose as ordered  - Assess for signs and symptoms of hyperglycemia and hypoglycemia  - Administer ordered medications to maintain glucose within target range  - Assess nutritional intake and initiate nutrition service referral as needed  Outcome: Progressing     Problem: SKIN/TISSUE INTEGRITY - ADULT  Goal: Skin Integrity remains intact(Skin Breakdown Prevention)  Description: Assess:  -Perform Jorge Luis assessment every   -Clean and moisturize skin every   -Inspect skin when repositioning, toileting, and assisting with ADLS  -Assess under medical devices such as  every   -Assess extremities for adequate circulation and sensation     Bed Management:  -Have minimal linens on bed & keep smooth, unwrinkled  -Change linens as needed when moist or perspiring  -Avoid sitting or lying in one position for more than  hours while in bed  -Keep HOB at degrees Toileting:  -Offer bedside commode  -Assess for incontinence every   -Use incontinent care products after each incontinent episode such as     Activity:  -Mobilize patient  times a day  -Encourage activity and walks on unit  -Encourage or provide ROM exercises   -Turn and reposition patient every  Hours  -Use appropriate equipment to lift or move patient in bed  -Instruct/ Assist with weight shifting every  when out of bed in chair  -Consider limitation of chair time  hour intervals    Skin Care:  -Avoid use of baby powder, tape, friction and shearing, hot water or constrictive clothing  -Relieve pressure over bony prominences using   -Do not massage red bony areas    Next Steps:  -Teach patient strategies to minimize risks such as    -Consider consults to  interdisciplinary teams such as   Outcome: Progressing  Goal: Incision(s), wounds(s) or drain site(s) healing without S/S of infection  Description: INTERVENTIONS  - Assess and document dressing, incision, wound bed, drain sites and surrounding tissue  - Provide patient and family education  - Perform skin care/dressing changes every   Outcome: Progressing  Goal: Pressure injury heals and does not worsen  Description: Interventions:  - Implement low air loss mattress or specialty surface (Criteria met)  - Apply silicone foam dressing  - Instruct/assist with weight shifting every  minutes when in chair   - Limit chair time to  hour intervals  - Use special pressure reducing interventions such as  when in chair   - Apply fecal or urinary incontinence containment device   - Perform passive or active ROM every   - Turn and reposition patient & offload bony prominences every  hours   - Utilize friction reducing device or surface for transfers   - Consider consults to  interdisciplinary teams such as   - Use incontinent care products after each incontinent episode such as    Consider nutrition services referral as needed  Outcome: Progressing     Problem: MUSCULOSKELETAL - ADULT  Goal: Maintain or return mobility to safest level of function  Description: INTERVENTIONS:  - Assess patient's ability to carry out ADLs; assess patient's baseline for ADL function and identify physical deficits which impact ability to perform ADLs (bathing, care of mouth/teeth, toileting, grooming, dressing, etc )  - Assess/evaluate cause of self-care deficits   - Assess range of motion  - Assess patient's mobility  - Assess patient's need for assistive devices and provide as appropriate  - Encourage maximum independence but intervene and supervise when necessary  - Involve family in performance of ADLs  - Assess for home care needs following discharge   - Consider OT consult to assist with ADL evaluation and planning for discharge  - Provide patient education as appropriate  Outcome: Progressing  Goal: Maintain proper alignment of affected body part  Description: INTERVENTIONS:  - Support, maintain and protect limb and body alignment  - Provide patient/ family with appropriate education  Outcome: Progressing

## 2022-05-09 NOTE — ASSESSMENT & PLAN NOTE
Wt Readings from Last 3 Encounters:   05/03/22 57 2 kg (126 lb)   11/21/20 57 6 kg (127 lb)   05/31/20 61 9 kg (136 lb 7 4 oz)       · Echocardiogram done on this admission:  Ejection fraction is 60%  Wall motion is normal   Moderate aortic valve stenosis  Mild mitral valve regurgitation  Mild tricuspid valve regurgitation    · Appears euvolemic on exam

## 2022-05-10 ENCOUNTER — EPISODE CHANGES (OUTPATIENT)
Dept: CASE MANAGEMENT | Facility: OTHER | Age: 87
End: 2022-05-10

## 2022-05-10 PROBLEM — N39.0 UTI (URINARY TRACT INFECTION): Status: ACTIVE | Noted: 2022-05-10

## 2022-05-10 LAB
GLUCOSE SERPL-MCNC: 109 MG/DL (ref 65–140)
GLUCOSE SERPL-MCNC: 140 MG/DL (ref 65–140)
GLUCOSE SERPL-MCNC: 169 MG/DL (ref 65–140)
GLUCOSE SERPL-MCNC: 179 MG/DL (ref 65–140)
GLUCOSE SERPL-MCNC: 240 MG/DL (ref 65–140)
GLUCOSE SERPL-MCNC: 81 MG/DL (ref 65–140)

## 2022-05-10 PROCEDURE — 82948 REAGENT STRIP/BLOOD GLUCOSE: CPT

## 2022-05-10 PROCEDURE — 99232 SBSQ HOSP IP/OBS MODERATE 35: CPT | Performed by: HOSPITALIST

## 2022-05-10 PROCEDURE — 87086 URINE CULTURE/COLONY COUNT: CPT | Performed by: HOSPITALIST

## 2022-05-10 PROCEDURE — 87077 CULTURE AEROBIC IDENTIFY: CPT | Performed by: HOSPITALIST

## 2022-05-10 PROCEDURE — 97530 THERAPEUTIC ACTIVITIES: CPT

## 2022-05-10 RX ADMIN — ENOXAPARIN SODIUM 40 MG: 40 INJECTION SUBCUTANEOUS at 08:59

## 2022-05-10 RX ADMIN — ATORVASTATIN CALCIUM 40 MG: 40 TABLET, FILM COATED ORAL at 17:31

## 2022-05-10 RX ADMIN — INSULIN LISPRO 1 UNITS: 100 INJECTION, SOLUTION INTRAVENOUS; SUBCUTANEOUS at 21:17

## 2022-05-10 RX ADMIN — ASPIRIN 81 MG: 81 TABLET, CHEWABLE ORAL at 08:59

## 2022-05-10 RX ADMIN — INSULIN LISPRO 1 UNITS: 100 INJECTION, SOLUTION INTRAVENOUS; SUBCUTANEOUS at 09:00

## 2022-05-10 RX ADMIN — INSULIN LISPRO 2 UNITS: 100 INJECTION, SOLUTION INTRAVENOUS; SUBCUTANEOUS at 11:47

## 2022-05-10 RX ADMIN — INSULIN ASPART 15 UNITS: 100 INJECTION, SUSPENSION SUBCUTANEOUS at 17:31

## 2022-05-10 RX ADMIN — CEFTRIAXONE 1000 MG: 1 INJECTION, POWDER, FOR SOLUTION INTRAMUSCULAR; INTRAVENOUS at 19:50

## 2022-05-10 RX ADMIN — CLOPIDOGREL BISULFATE 75 MG: 75 TABLET ORAL at 08:59

## 2022-05-10 RX ADMIN — INSULIN ASPART 20 UNITS: 100 INJECTION, SUSPENSION SUBCUTANEOUS at 09:00

## 2022-05-10 NOTE — PHYSICAL THERAPY NOTE
Physical Therapy Treatment Note    Patient Name: Jay More    Diagnosis: Weakness [R53 1]  Right sided weakness [R53 1]  Stroke-like symptoms [R29 90]  Ambulatory dysfunction [R26 2]     05/10/22 1332   PT Last Visit   PT Visit Date 05/10/22   Note Type   Note Type Treatment   Pain Assessment   Pain Assessment Tool 0-10   Pain Score No Pain   Restrictions/Precautions   Weight Bearing Precautions Per Order No   Other Precautions Cognitive; Bed Alarm; Fall Risk   General   Chart Reviewed Yes   Response to Previous Treatment Patient unable to report, no changes reported from family or staff   Family/Caregiver Present No   Cognition   Overall Cognitive Status Impaired   Arousal/Participation Alert; Responsive; Cooperative   Attention Within functional limits   Orientation Level Oriented to person;Disoriented to place; Disoriented to time;Disoriented to situation   Memory Decreased recall of recent events;Decreased short term memory;Decreased recall of biographical information   Following Commands Follows one step commands with increased time or repetition   Comments Pt agreeable to PT  Subjective   Subjective "I've been in bed all day "   Bed Mobility   Rolling R 4  Minimal assistance   Additional items Assist x 1;Bedrails; Increased time required;Verbal cues;LE management   Rolling L 4  Minimal assistance   Additional items Assist x 1;Bedrails; Increased time required;Verbal cues;LE management   Supine to Sit 3  Moderate assistance   Additional items Assist x 1;HOB elevated; Bedrails; Increased time required;Verbal cues;LE management   Sit to Supine 3  Moderate assistance   Additional items Assist x 1;Bedrails; Increased time required;Verbal cues;LE management   Transfers   Sit to Stand 3  Moderate assistance   Additional items Assist x 2; Increased time required;Verbal cues   Stand to Sit 3  Moderate assistance   Additional items Assist x 2; Increased time required;Verbal cues   Additional Comments Pt performed 2 sit to stand transfers; pt with signifcant forward flexed trunk and acheived 75% of full stand   Ambulation/Elevation   Gait pattern Not tested   Balance   Static Sitting Fair   Dynamic Sitting Fair -   Static Standing Poor -   Endurance Deficit   Endurance Deficit Yes   Endurance Deficit Description decreased activity tolerance   Activity Tolerance   Activity Tolerance Patient limited by fatigue   Medical Staff Made Aware PCA  Samantha   Assessment   Prognosis Fair   Problem List Decreased strength;Decreased endurance; Impaired balance;Decreased mobility; Decreased cognition   Assessment Chart reviewed  Patient was received supine in bed in NAD and agreeable to PT session  Today's PT treatment session consisted of therapeutic activity for facilitation of transitional movements and safe performance of correct technique for bed mobility and sit to stand transfers  In comparison to the previous session the patient has not made any progress as evident by requiring increased assistance with sit to stand transfers  Pt with signifcant forward flexed trunk in standing and only able to achieve 75% of full stand posture  Pt unable to ambulate this session secondary to inability to achieve full upright stand  Overall, patient had fair tolerance to today's treatment session as pt was limited by fatigue  Patient's prognosis for achieving their STG's is fair  PT intervention continues to be appropriate as the patient continues to be limited by decreased lower extremity strength, impaired balance, decreased endurance, gait deviations, and decreased functional mobility  Continue to recommend STR  PT to continue to see patient in order to address the deficits listed above and provide interventions consistent with the POC in order to achieve STG's and optimize the patient's independence with functional mobility     Barriers to Discharge Inaccessible home environment;Decreased caregiver support   Goals   STG Expiration Date 05/13/22   Plan Treatment/Interventions Functional transfer training;LE strengthening/ROM; Therapeutic exercise; Endurance training;Cognitive reorientation;Patient/family training;Bed mobility;Gait training;OT   Progress No functional improvements   PT Frequency 3-5x/wk   Recommendation   PT Discharge Recommendation Post acute rehabilitation services   AM-PAC Basic Mobility Inpatient   Turning in Bed Without Bedrails 3   Lying on Back to Sitting on Edge of Flat Bed 2   Moving Bed to Chair 1   Standing Up From Chair 1   Walk in Room 1   Climb 3-5 Stairs 1   Basic Mobility Inpatient Raw Score 9   Turning Head Towards Sound 4   Follow Simple Instructions 3   Low Function Basic Mobility Raw Score 16   Low Function Basic Mobility Standardized Score 25 72   Highest Level Of Mobility   JH-HLM Goal 3: Sit at edge of bed   JH-HLM Highest Level of Mobility 3: Sit at edge of bed   JH-HLM Goal Achieved Yes     Maria Antonia Barton, PT, DPT    Time of PT treatment session: 4395-1630  13 minutes

## 2022-05-10 NOTE — PROGRESS NOTES
[facility]  Progress Note - Antony Dejesus 1930, 80 y o  female MRN: [id number]  Unit/Bed#: -01 Encounter: [id number]    VTE Pharmacologic Prophylaxis: VTE Score: 8 Moderate Risk (Score 3-4) - Pharmacological DVT Prophylaxis Ordered: enoxaparin (Lovenox)  Patient Centered Rounds: I evaluated the patient without nursing staff present due to Nurse updated  Discussions with Specialists or Other Care Team Provider: CM    Education and Discussions with Family / Patient: Updated  (son) via phone  Time Spent for Care: 30 minutes  More than 50% of total time spent on counseling and coordination of care as described above  Current Length of Stay: 8 day(s)  Current Patient Status: Inpatient   Certification Statement: The patient will continue to require additional inpatient hospital stay due to Placement to rehab  Discharge Plan: Anticipate discharge in 48 hrs to rehab facility  Code Status: Level 3 - DNAR and DNI    Subjective:   Patient has no complaint     Objective:     Vitals:   Temp (24hrs), Av °F (36 7 °C), Min:97 °F (36 1 °C), Max:99 3 °F (37 4 °C)    Temp:  [97 °F (36 1 °C)-99 3 °F (37 4 °C)] 99 3 °F (37 4 °C)  HR:  [88-92] 92  Resp:  [14-20] 20  BP: (135-148)/(69-93) 135/93  SpO2:  [94 %-96 %] 96 %  Body mass index is 24 61 kg/m²  Input and Output Summary (last 24 hours):      Intake/Output Summary (Last 24 hours) at 5/10/2022 1915  Last data filed at 5/10/2022 1332  Gross per 24 hour   Intake 220 ml   Output --   Net 220 ml       Physical Exam:   Physical Exam      GEN: No acute distress, comfortable, elderly, chronically ill  HEEENT: No JVD, PERRLA, no scleral icterus  RESP: Lungs clear to auscultation bilaterally  CV: RRR, +s1/s2   ABD: SOFT NON TENDER, POSITIVE BOWEL SOUNDS, NO DISTENTION  PSYCH: apparent dementia  NEURO:  NO FOCAL DEFICITS  SKIN: NO RASH  EXTREM: NO EDEMA       Additional Data:     Labs:  Results from last 7 days   Lab Units 05/04/22  0448   WBC Thousand/uL 9 28   HEMOGLOBIN g/dL 12 8   HEMATOCRIT % 39 9   PLATELETS Thousands/uL 178     Results from last 7 days   Lab Units 05/08/22  0612   SODIUM mmol/L 136   POTASSIUM mmol/L 4 3   CHLORIDE mmol/L 102   CO2 mmol/L 27   BUN mg/dL 19   CREATININE mg/dL 0 88   ANION GAP mmol/L 7   CALCIUM mg/dL 8 7   GLUCOSE RANDOM mg/dL 142*         Results from last 7 days   Lab Units 05/10/22  1629 05/10/22  1056 05/10/22  0714 05/10/22  0314 05/10/22  0135 05/09/22  2114 05/09/22  1529 05/09/22  1057 05/09/22  0658 05/08/22  2114 05/08/22  1555 05/08/22  1049   POC GLUCOSE mg/dl 109 240* 169* 140 81 75 130 185* 205* 179* 82 178*               Lines/Drains:  Invasive Devices  Report    Peripheral Intravenous Line            Peripheral IV 05/10/22 Left;Dorsal (posterior) Forearm <1 day          Drain            External Urinary Catheter <1 day                      Imaging: No pertinent imaging reviewed      Recent Cultures (last 7 days):         Last 24 Hours Medication List:   Current Facility-Administered Medications   Medication Dose Route Frequency Provider Last Rate    aspirin  81 mg Oral Daily [facility] [place] [place]      atorvastatin  40 mg Oral QPM [facility] [place], [place]      cefTRIAXone  1,000 mg Intravenous Q24H Makenzie Stephens MD      clopidogrel  75 mg Oral Daily BRIAN Rosen      enoxaparin  40 mg Subcutaneous Daily Kaylin Kelley PA-C      insulin aspart protamine-insulin aspart  15 Units Subcutaneous QPM Leanne Pickens MD      insulin aspart protamine-insulin aspart  20 Units Subcutaneous QAM Leanne Pickens MD      insulin lispro  1-5 Units Subcutaneous TID AC [place], [place]      insulin lispro  1-5 Units Subcutaneous HS Alyssa Arshad PA-C      OLANZapine  5 mg Oral HS PRN Leanne Pickens MD          Today, Patient Was Seen By: Fadia Gallagher MD    **Please Note: This note may have been constructed using a voice recognition system  **Primary Care Provider: Xenia Bell MD   Date and time admitted to hospital: 5/2/2022  8:03 PM    * Cerebrovascular accident (CVA) due to stenosis of right posterior cerebral artery ([facility] [address] )  Assessment & Plan  · Initial left sided weakness   · CTA without evidence for acute infarct, bleed, or mass effect, however is showing occlusion vs severe stenosis of the right posterior cerebral artery and 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  · CTA of the head and neck showed severe stenosis/occlusion of the right PCA, with 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, her labs revealed significant hyperlipidemia, and on examination patient had subtle left-sided weakness with neglect and field cut to suspect a right PCA infarct  ·   Patient admitted on stroke pathway  · MRI of the brain done subsequently confirms the presence of a right PCA infarct of recent duration  · DAPT for 21 dys after which ASA alone will be continued  · Continue statin therapy, ASA and atorvastatin  · Control blood pressure/ blood sugar   · PT/OT recommended inpatient rehab  · Neuro signed off and outpatient F/U recommended   · PT/OT ordered for placement  · Possible dc in am if placed        Urinary frequency  Assessment & Plan  · Positive  urinary frequency since Friday without fever  · UA (+) bu was not treated   · Patient unable to give more ROS and thus was not treated         UTI (urinary tract infection)  Assessment & Plan  UA (+)  Will obtain Urine cx  Will start on iv Rocephin   Patient has urinary frequency since admission     Hyponatremia  Assessment & Plan  Resolved    Late onset Alzheimer's disease without behavioral disturbance ([facility] [address] )  Assessment & Plan  · Had episodes of confusion, not able to answer questions properly but compared to admission the agitation has significantly decreased  · Today able to answer simple questions    · Son reports patient is at mental baseline  · Status post one-time dose IM zyprexa and utilize soft restraint belt to avoid fall at this time  · Son is looking for short term rehab or nursing home placement due to difficulty taking care of the patient at home  · Delirium precautions  · Case management consulted, as patient will need placement  · No need of any restraints or one-to-one observation at this time  · As per case management, patient will be accepted to the rehabilitation facility, once without physical or chemical restraints for at least 24 hours  As per patient's nurse, no physical or chemical restraints done more than 24 hours now  However, according to case management, no accepting facility yet at this point, as it is hard to get an accepting facility as patient is still unvaccinated for the COVID-19  This was discussed with the patient's son and he is okay with his mother having her 1st COVID-19 vaccine here (in fact, he has been telling me that he has been asking for this even during her admission)  As per case management, usually, patient should be fully vaccinated (at least 2 shots), to be easier to get a facility, and this was also discussed with the patient's son  Okay with the 1st COVID 19 vaccine here  Patient was told about this and okay with this plan  Chronic diastolic congestive heart failure (HCC)  Assessment & Plan  Wt Readings from Last 3 Encounters:   05/03/22 57 2 kg (126 lb)   11/21/20 57 6 kg (127 lb)   05/31/20 61 9 kg (136 lb 7 4 oz)       · Echocardiogram done on this admission:  Ejection fraction is 60%  Wall motion is normal   Moderate aortic valve stenosis  Mild mitral valve regurgitation  Mild tricuspid valve regurgitation    · Appears euvolemic on exam      Type 2 diabetes mellitus without complication, with long-term current use of insulin (HCC)  Assessment & Plan  Lab Results   Component Value Date    HGBA1C 8 2 (H) 05/03/2022     · Presently, blood sugars are at acceptable levels  · Continue NovoLog 70/30 20 units in the morning and 15 units in the evening  · Correctional SSI  · Hypoglycemia protocol; diabetic diet ordered  · Adjust treatment accordingly

## 2022-05-10 NOTE — WOUND OSTOMY CARE
Progress Note - Wound   Chetna Amado 80 y o  female MRN: [id number]  Unit/Bed#: -01 Encounter: [id number]      Assessment:   Patient admitted due to CVA  History of CHF, diabetes, late onset Alzheimer's  Wound care follow-up for buttock wounds  Patient agreeable to assessment, alert and oriented to self, incontinent of bowel and bladder, wedges in use for turning and repositioning, heels elevated off of mattress, is dependent for care  Primary RN made aware of assessment findings       1  Pressure injury right buttock, can now be staged at stage 1-POA- Wound is round in shape, dry and intact skin, 100% non-blanchable red and pink skin, no drainage noted  Nuzhat-wound is dry, intact, blanchable pink skin      2  Left buttock- Wound on assessment has resolved, skin is dry, intact, blanchable pink skin      3  Sacrum, bilateral hips, elbows, and heels are dry, intact, blanchable pink skin      Educated patient on importance of frequent offloading of pressure via turning, repositioning, and weight-shifting       No induration, fluctuance, odor, warmth, or purulence noted to the above noted wounds  Patient tolerated well, pain noted to the right buttock wound  Primary nurse aware of plan of care  See flow sheets for more detailed assessment findings  Will follow along      Skin care Plan:  1-Cleanse sacro-buttocks with soap and water  Apply Allevyn foam to right buttock wound  Olegario with T for treatment  Change every three days and PRN  Peel back and check skin q-shift  -(if patient soils dressing more than once per shift, discontinue Allevyn foam dressing and continue with Hydraguard BID and PRN )  2-Turn/reposition q2h  for pressure re-distribution on skin   3-Elevate heels to offload pressure  4-Moisturize skin daily with skin nourishing cream  5-Ehob cushion in chair when out of bed  6-Hydraguard to bilateral heels BID and PRN  7-Left buttock- Cleanse with soap and water, pat dry   Apply Hydraguard to left buttock BID and PRN  8-Apply accu-max pump to mattress  Wound 05/03/22 Buttocks Medial;Left (Active)   Wound Image   05/10/22 0943   Wound Description Dry; Intact 05/10/22 0943   Pressure Injury Stage     Nuzhat-wound Assessment Dry; Intact 05/10/22 0943   Wound Length (cm) 0 cm 05/10/22 0943   Wound Width (cm) 0 cm 05/10/22 0943   Wound Depth (cm) 0 cm 05/10/22 0943   Wound Surface Area (cm^2) 0 cm^2 05/10/22 0943   Wound Volume (cm^3) 0 cm^3 05/10/22 0943   Calculated Wound Volume (cm^3) 0 cm^3 05/10/22 0943   Tunneling 0 cm 05/10/22 0943   Undermining 0 05/10/22 0943   Drainage Amount     Non-staged Wound Description     Treatments Cleansed;Site care 05/10/22 0943   Dressing Moisture barrier 05/10/22 0943   Wound packed? No 05/10/22 0943   Dressing Changed New 05/10/22 0943   Patient Tolerance Tolerated well 05/10/22 0943   Dressing Status         Wound 05/04/22 Pressure Injury Buttocks Right (Active)   Wound Image   05/10/22 0942   Wound Description Dry; Intact; Non-blanchable erythema 05/10/22 0942   Pressure Injury Stage 1 05/10/22 0942   Nuzhat-wound Assessment Dry; Intact; Pink 05/10/22 0942   Wound Length (cm) 1 cm 05/10/22 0942   Wound Width (cm) 1 cm 05/10/22 0942   Wound Depth (cm) 0 cm 05/10/22 0942   Wound Surface Area (cm^2) 1 cm^2 05/10/22 0942   Wound Volume (cm^3) 0 cm^3 05/10/22 0942   Calculated Wound Volume (cm^3) 0 cm^3 05/10/22 0942   Tunneling 0 cm 05/10/22 0942   Undermining 0 05/10/22 0942   Drainage Amount None 05/10/22 0942   Non-staged Wound Description Not applicable 22/30/88 0727   Treatments Cleansed;Site care 05/10/22 0942   Dressing Foam, Silicon (eg  Allevyn, etc) 05/10/22 0942   Wound packed? No 05/10/22 0942   Dressing Changed New 05/10/22 0942   Patient Tolerance Tolerated well 05/10/22 0942   Dressing Status Clean;Dry; Intact 05/10/22 0942       Wound 05/05/22 Abrasion(s) Leg Left; Anterior (Active)   Wound Image   05/10/22 0946   Wound Description Dry;Pink;Yellow 05/10/22 2636 Nuzhat-wound Assessment Dry; Intact; Pink 05/10/22 0946   Wound Length (cm) 14 5 cm 05/10/22 0946   Wound Width (cm) 1 cm 05/10/22 0946   Wound Depth (cm) 0 1 cm 05/10/22 0946   Wound Surface Area (cm^2) 14 5 cm^2 05/10/22 0946   Wound Volume (cm^3) 1 45 cm^3 05/10/22 0946   Calculated Wound Volume (cm^3) 1 45 cm^3 05/10/22 0946   Tunneling 0 cm 05/10/22 0946   Undermining 0 05/10/22 0946   Drainage Amount None 05/10/22 0946   Non-staged Wound Description Not applicable 58/95/38 6626   Treatments Cleansed;Site care 05/10/22 0946   Dressing Xeroform;ABD;Dry dressing 05/10/22 0946   Wound packed? No 05/10/22 0946   Dressing Changed Changed 05/10/22 0946   Patient Tolerance Tolerated well 05/10/22 0946   Dressing Status Clean;Dry; Intact 05/10/22 0946     Call or tigertext with any questions  Wound Care will continue to follow    Maxwell JEFFRIESN RN [facility]  Wound care

## 2022-05-10 NOTE — PLAN OF CARE
Problem: MOBILITY - ADULT  Goal: Maintain or return to baseline ADL function  Description: INTERVENTIONS:  -  Assess patient's ability to carry out ADLs; assess patient's baseline for ADL function and identify physical deficits which impact ability to perform ADLs (bathing, care of mouth/teeth, toileting, grooming, dressing, etc )  - Assess/evaluate cause of self-care deficits   - Assess range of motion  - Assess patient's mobility; develop plan if impaired  - Assess patient's need for assistive devices and provide as appropriate  - Encourage maximum independence but intervene and supervise when necessary  - Involve family in performance of ADLs  - Assess for home care needs following discharge   - Consider OT consult to assist with ADL evaluation and planning for discharge  - Provide patient education as appropriate  Outcome: Progressing  Goal: Maintains/Returns to pre admission functional level  Description: INTERVENTIONS:  - Perform BMAT or MOVE assessment daily    - Set and communicate daily mobility goal to care team and patient/family/caregiver  - Collaborate with rehabilitation services on mobility goals if consulted  - Perform Range of Motion  times a day  - Reposition patient every  hours    - Dangle patient  times a day  - Stand patient  times a day  - Ambulate patient  times a day  - Out of bed to chair  times a day   - Out of bed for meals  times a day  - Out of bed for toileting  - Record patient progress and toleration of activity level   Outcome: Progressing     Problem: Potential for Falls  Goal: Patient will remain free of falls  Description: INTERVENTIONS:  - Educate patient/family on patient safety including physical limitations  - Instruct patient to call for assistance with activity   - Consult OT/PT to assist with strengthening/mobility   - Keep Call bell within reach  - Keep bed low and locked with side rails adjusted as appropriate  - Keep care items and personal belongings within reach  - Initiate and maintain comfort rounds  - Make Fall Risk Sign visible to staff  - Offer Toileting every  Hours, in advance of need  - Initiate/Maintain alarm  - Obtain necessary fall risk management equipment:  - Apply yellow socks and bracelet for high fall risk patients  - Consider moving patient to room near nurses station  Outcome: Progressing     Problem: Prexisting or High Potential for Compromised Skin Integrity  Goal: Skin integrity is maintained or improved  Description: INTERVENTIONS:  - Identify patients at risk for skin breakdown  - Assess and monitor skin integrity  - Assess and monitor nutrition and hydration status  - Monitor labs   - Assess for incontinence   - Turn and reposition patient  - Assist with mobility/ambulation  - Relieve pressure over bony prominences  - Avoid friction and shearing  - Provide appropriate hygiene as needed including keeping skin clean and dry  - Evaluate need for skin moisturizer/barrier cream  - Collaborate with interdisciplinary team   - Patient/family teaching  - Consider wound care consult   Outcome: Progressing     Problem: Nutrition/Hydration-ADULT  Goal: Nutrient/Hydration intake appropriate for improving, restoring or maintaining nutritional needs  Description: Monitor and assess patient's nutrition/hydration status for malnutrition  Collaborate with interdisciplinary team and initiate plan and interventions as ordered  Monitor patient's weight and dietary intake as ordered or per policy  Utilize nutrition screening tool and intervene as necessary  Determine patient's food preferences and provide high-protein, high-caloric foods as appropriate       INTERVENTIONS:  - Monitor oral intake, urinary output, labs, and treatment plans  - Assess nutrition and hydration status and recommend course of action  - Evaluate amount of meals eaten  - Assist patient with eating if necessary   - Allow adequate time for meals  - Recommend/ encourage appropriate diets, oral nutritional supplements, and vitamin/mineral supplements  - Order, calculate, and assess calorie counts as needed  - Assess need for intravenous fluids  - Provide nutrition/hydration education as appropriate  - Include patient/family/caregiver in decisions related to nutrition  Outcome: Progressing     Problem: METABOLIC, FLUID AND ELECTROLYTES - ADULT  Goal: Electrolytes maintained within normal limits  Description: INTERVENTIONS:  - Monitor labs and assess patient for signs and symptoms of electrolyte imbalances  - Administer electrolyte replacement as ordered  - Monitor response to electrolyte replacements, including repeat lab results as appropriate  - Instruct patient on fluid and nutrition as appropriate  Outcome: Progressing  Goal: Fluid balance maintained  Description: INTERVENTIONS:  - Monitor labs   - Monitor I/O and WT  - Instruct patient on fluid and nutrition as appropriate  - Assess for signs & symptoms of volume excess or deficit  Outcome: Progressing  Goal: Glucose maintained within target range  Description: INTERVENTIONS:  - Monitor Blood Glucose as ordered  - Assess for signs and symptoms of hyperglycemia and hypoglycemia  - Administer ordered medications to maintain glucose within target range  - Assess nutritional intake and initiate nutrition service referral as needed  Outcome: Progressing     Problem: SKIN/TISSUE INTEGRITY - ADULT  Goal: Skin Integrity remains intact(Skin Breakdown Prevention)  Description: Assess:  -Perform Jorge Luis assessment every   -Clean and moisturize skin every   -Inspect skin when repositioning, toileting, and assisting with ADLS  -Assess under medical devices such as  every   -Assess extremities for adequate circulation and sensation     Bed Management:  -Have minimal linens on bed & keep smooth, unwrinkled  -Change linens as needed when moist or perspiring  -Avoid sitting or lying in one position for more than  hours while in bed  -Keep HOB at degrees Toileting:  -Offer bedside commode  -Assess for incontinence every   -Use incontinent care products after each incontinent episode such as     Activity:  -Mobilize patient  times a day  -Encourage activity and walks on unit  -Encourage or provide ROM exercises   -Turn and reposition patient every  Hours  -Use appropriate equipment to lift or move patient in bed  -Instruct/ Assist with weight shifting every  when out of bed in chair  -Consider limitation of chair time  hour intervals    Skin Care:  -Avoid use of baby powder, tape, friction and shearing, hot water or constrictive clothing  -Relieve pressure over bony prominences using   -Do not massage red bony areas    Next Steps:  -Teach patient strategies to minimize risks such as    -Consider consults to  interdisciplinary teams such as   Outcome: Progressing  Goal: Incision(s), wounds(s) or drain site(s) healing without S/S of infection  Description: INTERVENTIONS  - Assess and document dressing, incision, wound bed, drain sites and surrounding tissue  - Provide patient and family education  - Perform skin care/dressing changes every   Outcome: Progressing  Goal: Pressure injury heals and does not worsen  Description: Interventions:  - Implement low air loss mattress or specialty surface (Criteria met)  - Apply silicone foam dressing  - Instruct/assist with weight shifting every  minutes when in chair   - Limit chair time to  hour intervals  - Use special pressure reducing interventions such as when in chair   - Apply fecal or urinary incontinence containment device   - Perform passive or active ROM every   - Turn and reposition patient & offload bony prominences every  hours   - Utilize friction reducing device or surface for transfers   - Consider consults to  interdisciplinary teams such as   - Use incontinent care products after each incontinent episode such as   - Consider nutrition services referral as needed  Outcome: Progressing     Problem: MUSCULOSKELETAL - ADULT  Goal: Maintain or return mobility to safest level of function  Description: INTERVENTIONS:  - Assess patient's ability to carry out ADLs; assess patient's baseline for ADL function and identify physical deficits which impact ability to perform ADLs (bathing, care of mouth/teeth, toileting, grooming, dressing, etc )  - Assess/evaluate cause of self-care deficits   - Assess range of motion  - Assess patient's mobility  - Assess patient's need for assistive devices and provide as appropriate  - Encourage maximum independence but intervene and supervise when necessary  - Involve family in performance of ADLs  - Assess for home care needs following discharge   - Consider OT consult to assist with ADL evaluation and planning for discharge  - Provide patient education as appropriate  Outcome: Progressing  Goal: Maintain proper alignment of affected body part  Description: INTERVENTIONS:  - Support, maintain and protect limb and body alignment  - Provide patient/ family with appropriate education  Outcome: Progressing

## 2022-05-10 NOTE — ASSESSMENT & PLAN NOTE
· Positive  urinary frequency since Friday without fever  · UA (+) bu was not treated   · Patient unable to give more ROS and thus was not treated

## 2022-05-10 NOTE — PLAN OF CARE
Problem: PHYSICAL THERAPY ADULT  Goal: Performs mobility at highest level of function for planned discharge setting  See evaluation for individualized goals  Description: Treatment/Interventions: Functional transfer training,LE strengthening/ROM,Therapeutic exercise,Endurance training,Cognitive reorientation,Patient/family training,Bed mobility,Gait training,Spoke to nursing,OT          See flowsheet documentation for full assessment, interventions and recommendations  Outcome: Not Progressing  Note: Prognosis: Fair  Problem List: Decreased strength,Decreased endurance,Impaired balance,Decreased mobility,Decreased cognition  Assessment: Chart reviewed  Patient was received supine in bed in NAD and agreeable to PT session  Today's PT treatment session consisted of therapeutic activity for facilitation of transitional movements and safe performance of correct technique for bed mobility and sit to stand transfers  In comparison to the previous session the patient has not made any progress as evident by requiring increased assistance with sit to stand transfers  Pt with signifcant forward flexed trunk in standing and only able to achieve 75% of full stand posture  Pt unable to ambulate this session secondary to inability to achieve full upright stand  Overall, patient had fair tolerance to today's treatment session as pt was limited by fatigue  Patient's prognosis for achieving their STG's is fair  PT intervention continues to be appropriate as the patient continues to be limited by decreased lower extremity strength, impaired balance, decreased endurance, gait deviations, and decreased functional mobility  Continue to recommend STR  PT to continue to see patient in order to address the deficits listed above and provide interventions consistent with the POC in order to achieve STG's and optimize the patient's independence with functional mobility      Barriers to Discharge: Inaccessible home environment,Decreased caregiver support     PT Discharge Recommendation: Post acute rehabilitation services    See flowsheet documentation for full assessment

## 2022-05-10 NOTE — ASSESSMENT & PLAN NOTE
· Initial left sided weakness   · CTA without evidence for acute infarct, bleed, or mass effect, however is showing occlusion vs severe stenosis of the right posterior cerebral artery and 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  · CTA of the head and neck showed severe stenosis/occlusion of the right PCA, with 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, her labs revealed significant hyperlipidemia, and on examination patient had subtle left-sided weakness with neglect and field cut to suspect a right PCA infarct  ·   Patient admitted on stroke pathway  · MRI of the brain done subsequently confirms the presence of a right PCA infarct of recent duration  · DAPT for 21 dys after which ASA alone will be continued  · Continue statin therapy, ASA and atorvastatin  · Control blood pressure/ blood sugar   · PT/OT recommended inpatient rehab      · Neuro signed off and outpatient F/U recommended   · PT/OT ordered for placement  · Possible dc in am if placed

## 2022-05-10 NOTE — ASSESSMENT & PLAN NOTE
UA (+)  Will obtain Urine cx  Will start on iv Rocephin   Patient has urinary frequency since admission

## 2022-05-11 LAB
ALBUMIN SERPL BCP-MCNC: 2.8 G/DL (ref 3.5–5)
ALP SERPL-CCNC: 121 U/L (ref 46–116)
ALT SERPL W P-5'-P-CCNC: 43 U/L (ref 12–78)
ANION GAP SERPL CALCULATED.3IONS-SCNC: 8 MMOL/L (ref 4–13)
AST SERPL W P-5'-P-CCNC: 48 U/L (ref 5–45)
BASOPHILS # BLD AUTO: 0.04 THOUSANDS/ΜL (ref 0–0.1)
BASOPHILS NFR BLD AUTO: 0 % (ref 0–1)
BILIRUB SERPL-MCNC: 0.46 MG/DL (ref 0.2–1)
BUN SERPL-MCNC: 23 MG/DL (ref 5–25)
CALCIUM ALBUM COR SERPL-MCNC: 9.4 MG/DL (ref 8.3–10.1)
CALCIUM SERPL-MCNC: 8.4 MG/DL (ref 8.3–10.1)
CHLORIDE SERPL-SCNC: 101 MMOL/L (ref 100–108)
CO2 SERPL-SCNC: 25 MMOL/L (ref 21–32)
CREAT SERPL-MCNC: 0.99 MG/DL (ref 0.6–1.3)
EOSINOPHIL # BLD AUTO: 0.23 THOUSAND/ΜL (ref 0–0.61)
EOSINOPHIL NFR BLD AUTO: 2 % (ref 0–6)
ERYTHROCYTE [DISTWIDTH] IN BLOOD BY AUTOMATED COUNT: 13.2 % (ref 11.6–15.1)
GFR SERPL CREATININE-BSD FRML MDRD: 49 ML/MIN/1.73SQ M
GLUCOSE SERPL-MCNC: 159 MG/DL (ref 65–140)
GLUCOSE SERPL-MCNC: 165 MG/DL (ref 65–140)
GLUCOSE SERPL-MCNC: 176 MG/DL (ref 65–140)
GLUCOSE SERPL-MCNC: 199 MG/DL (ref 65–140)
GLUCOSE SERPL-MCNC: 210 MG/DL (ref 65–140)
HCT VFR BLD AUTO: 38.8 % (ref 34.8–46.1)
HGB BLD-MCNC: 12.7 G/DL (ref 11.5–15.4)
IMM GRANULOCYTES # BLD AUTO: 0.05 THOUSAND/UL (ref 0–0.2)
IMM GRANULOCYTES NFR BLD AUTO: 0 % (ref 0–2)
LYMPHOCYTES # BLD AUTO: 1.53 THOUSANDS/ΜL (ref 0.6–4.47)
LYMPHOCYTES NFR BLD AUTO: 12 % (ref 14–44)
MCH RBC QN AUTO: 28.9 PG (ref 26.8–34.3)
MCHC RBC AUTO-ENTMCNC: 32.7 G/DL (ref 31.4–37.4)
MCV RBC AUTO: 88 FL (ref 82–98)
MONOCYTES # BLD AUTO: 1.32 THOUSAND/ΜL (ref 0.17–1.22)
MONOCYTES NFR BLD AUTO: 10 % (ref 4–12)
NEUTROPHILS # BLD AUTO: 9.55 THOUSANDS/ΜL (ref 1.85–7.62)
NEUTS SEG NFR BLD AUTO: 76 % (ref 43–75)
NRBC BLD AUTO-RTO: 0 /100 WBCS
PLATELET # BLD AUTO: 221 THOUSANDS/UL (ref 149–390)
PMV BLD AUTO: 12 FL (ref 8.9–12.7)
POTASSIUM SERPL-SCNC: 4 MMOL/L (ref 3.5–5.3)
PROT SERPL-MCNC: 6.2 G/DL (ref 6.4–8.2)
RBC # BLD AUTO: 4.39 MILLION/UL (ref 3.81–5.12)
SODIUM SERPL-SCNC: 134 MMOL/L (ref 136–145)
WBC # BLD AUTO: 12.72 THOUSAND/UL (ref 4.31–10.16)

## 2022-05-11 PROCEDURE — 80053 COMPREHEN METABOLIC PANEL: CPT | Performed by: HOSPITALIST

## 2022-05-11 PROCEDURE — 82948 REAGENT STRIP/BLOOD GLUCOSE: CPT

## 2022-05-11 PROCEDURE — 85025 COMPLETE CBC W/AUTO DIFF WBC: CPT | Performed by: HOSPITALIST

## 2022-05-11 PROCEDURE — 99232 SBSQ HOSP IP/OBS MODERATE 35: CPT | Performed by: HOSPITALIST

## 2022-05-11 RX ORDER — ENOXAPARIN SODIUM 100 MG/ML
30 INJECTION SUBCUTANEOUS DAILY
Status: DISCONTINUED | OUTPATIENT
Start: 2022-05-12 | End: 2022-05-12 | Stop reason: HOSPADM

## 2022-05-11 RX ADMIN — INSULIN ASPART 15 UNITS: 100 INJECTION, SUSPENSION SUBCUTANEOUS at 17:12

## 2022-05-11 RX ADMIN — ATORVASTATIN CALCIUM 40 MG: 40 TABLET, FILM COATED ORAL at 17:12

## 2022-05-11 RX ADMIN — ENOXAPARIN SODIUM 40 MG: 40 INJECTION SUBCUTANEOUS at 08:23

## 2022-05-11 RX ADMIN — INSULIN ASPART 20 UNITS: 100 INJECTION, SUSPENSION SUBCUTANEOUS at 08:23

## 2022-05-11 RX ADMIN — INSULIN LISPRO 1 UNITS: 100 INJECTION, SOLUTION INTRAVENOUS; SUBCUTANEOUS at 21:49

## 2022-05-11 RX ADMIN — INSULIN LISPRO 2 UNITS: 100 INJECTION, SOLUTION INTRAVENOUS; SUBCUTANEOUS at 11:15

## 2022-05-11 RX ADMIN — ASPIRIN 81 MG: 81 TABLET, CHEWABLE ORAL at 08:23

## 2022-05-11 RX ADMIN — CLOPIDOGREL BISULFATE 75 MG: 75 TABLET ORAL at 08:23

## 2022-05-11 RX ADMIN — INSULIN LISPRO 1 UNITS: 100 INJECTION, SOLUTION INTRAVENOUS; SUBCUTANEOUS at 16:13

## 2022-05-11 RX ADMIN — INSULIN LISPRO 1 UNITS: 100 INJECTION, SOLUTION INTRAVENOUS; SUBCUTANEOUS at 08:22

## 2022-05-11 RX ADMIN — CEFTRIAXONE 1000 MG: 1 INJECTION, POWDER, FOR SOLUTION INTRAMUSCULAR; INTRAVENOUS at 18:33

## 2022-05-11 NOTE — CASE MANAGEMENT
Case Management Discharge Planning Note    Patient name Kyle Dickey  Location [address] 212/-[id number] MRN [id number]  : 1930 Date 2022       Current Admission Date: 2022  Current Admission Diagnosis:Cerebrovascular accident (CVA) due to stenosis of right posterior cerebral artery [facility])   Patient Active Problem List    Diagnosis Date Noted    Ambulatory dysfunction     Right sided weakness     UTI (urinary tract infection) 05/10/2022    Hyponatremia 2022    Cerebrovascular accident (CVA) due to stenosis of right posterior cerebral artery ([facility] [address] ) 2022    Urinary frequency 2022    Pressure injury of left heel, unstageable [facility] ) 2020    Syncope 10/25/2019    Pressure injury of left buttock, stage 1 10/25/2019    Late onset Alzheimer's disease without behavioral disturbance ([facility] ) 10/25/2019    Need for immunization against influenza 2018    Type 2 diabetes mellitus without complication, with long-term current use of insulin ([facility] ) 2018    Chronic diastolic congestive heart failure ([facility] [address] ) 2018    Aortic valve disorder 2018      LOS (days): 9  Geometric Mean LOS (GMLOS) (days): 2 90  Days to GMLOS:-5 8     OBJECTIVE:  Risk of Unplanned Readmission Score: 16 65         Current admission status: Inpatient   Preferred Pharmacy:   [facility] # [address] [place] [address] [address] [facility] [place] [id number]  Phone: [phone] Fax: [phone]    Primary Care Provider: Ramila Thayer MD    Primary Insurance: MEDICARE  [facility]    DISCHARGE DETAILS:                                          Other Referral/Resources/Interventions Provided:  Interventions: Short Term Rehab  Referral Comments: Dominique Garcia from [facility] informs CM that she could have accepted pt yesterday, but has no yellow beds available today    She promises to work on this and wants CM to call first thing in the am regarding availability of a yellow bed    CM checked with Dr Glenn Kamara and pt has not been on Zyprexa since 5/4/22    Would you like to participate in our [address] service program?  : No - Declined    Treatment Team Recommendation: Short Term Rehab  Discharge Destination Plan[de-identified] Short Term Rehab  Transport at Discharge : BLS Ambulance

## 2022-05-11 NOTE — ASSESSMENT & PLAN NOTE
· Initial left sided weakness   · CTA without evidence for acute infarct, bleed, or mass effect, however is showing occlusion vs severe stenosis of the right posterior cerebral artery and 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  · CTA of the head and neck showed severe stenosis/occlusion of the right PCA, with 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, her labs revealed significant hyperlipidemia, and on examination patient had subtle left-sided weakness with neglect and field cut to suspect a right PCA infarct  ·   Patient admitted on stroke pathway  · MRI of the brain done subsequently confirms the presence of a right PCA infarct of recent duration  · DAPT for 21 dys after which ASA alone will be continued  · Continue statin therapy, ASA and atorvastatin  · Control blood pressure/ blood sugar   · PT/OT recommended inpatient rehab      · Neuro signed off and outpatient F/U recommended   · Possible DC once placed

## 2022-05-11 NOTE — PLAN OF CARE
Problem: MOBILITY - ADULT  Goal: Maintain or return to baseline ADL function  Description: INTERVENTIONS:  -  Assess patient's ability to carry out ADLs; assess patient's baseline for ADL function and identify physical deficits which impact ability to perform ADLs (bathing, care of mouth/teeth, toileting, grooming, dressing, etc )  - Assess/evaluate cause of self-care deficits   - Assess range of motion  - Assess patient's mobility; develop plan if impaired  - Assess patient's need for assistive devices and provide as appropriate  - Encourage maximum independence but intervene and supervise when necessary  - Involve family in performance of ADLs  - Assess for home care needs following discharge   - Consider OT consult to assist with ADL evaluation and planning for discharge  - Provide patient education as appropriate  Outcome: Progressing  Goal: Maintains/Returns to pre admission functional level  Description: INTERVENTIONS:  - Perform BMAT or MOVE assessment daily    - Set and communicate daily mobility goal to care team and patient/family/caregiver  - Collaborate with rehabilitation services on mobility goals if consulted  - Perform Range of Motion 2 times a day  - Reposition patient every 2 hours    - Dangle patient 3 times a day  - Stand patient 3 times a day  - Ambulate patient 3 times a day  - Out of bed to chair 3 times a day   - Out of bed for meals 3 times a day  - Out of bed for toileting  - Record patient progress and toleration of activity level   Outcome: Progressing     Problem: Potential for Falls  Goal: Patient will remain free of falls  Description: INTERVENTIONS:  - Educate patient/family on patient safety including physical limitations  - Instruct patient to call for assistance with activity   - Consult OT/PT to assist with strengthening/mobility   - Keep Call bell within reach  - Keep bed low and locked with side rails adjusted as appropriate  - Keep care items and personal belongings within reach  - Initiate and maintain comfort rounds  - Make Fall Risk Sign visible to staff  - Offer Toileting every 2 Hours, in advance of need  - Initiate/Maintain bed alarm  - Obtain necessary fall risk management equipment:   - Apply yellow socks and bracelet for high fall risk patients  - Consider moving patient to room near nurses station  Outcome: Progressing     Problem: Prexisting or High Potential for Compromised Skin Integrity  Goal: Skin integrity is maintained or improved  Description: INTERVENTIONS:  - Identify patients at risk for skin breakdown  - Assess and monitor skin integrity  - Assess and monitor nutrition and hydration status  - Monitor labs   - Assess for incontinence   - Turn and reposition patient  - Assist with mobility/ambulation  - Relieve pressure over bony prominences  - Avoid friction and shearing  - Provide appropriate hygiene as needed including keeping skin clean and dry  - Evaluate need for skin moisturizer/barrier cream  - Collaborate with interdisciplinary team   - Patient/family teaching  - Consider wound care consult   Outcome: Progressing     Problem: Nutrition/Hydration-ADULT  Goal: Nutrient/Hydration intake appropriate for improving, restoring or maintaining nutritional needs  Description: Monitor and assess patient's nutrition/hydration status for malnutrition  Collaborate with interdisciplinary team and initiate plan and interventions as ordered  Monitor patient's weight and dietary intake as ordered or per policy  Utilize nutrition screening tool and intervene as necessary  Determine patient's food preferences and provide high-protein, high-caloric foods as appropriate       INTERVENTIONS:  - Monitor oral intake, urinary output, labs, and treatment plans  - Assess nutrition and hydration status and recommend course of action  - Evaluate amount of meals eaten  - Assist patient with eating if necessary   - Allow adequate time for meals  - Recommend/ encourage appropriate diets, oral nutritional supplements, and vitamin/mineral supplements  - Order, calculate, and assess calorie counts as needed  - Assess need for intravenous fluids  - Provide nutrition/hydration education as appropriate  - Include patient/family/caregiver in decisions related to nutrition  Outcome: Progressing     Problem: METABOLIC, FLUID AND ELECTROLYTES - ADULT  Goal: Electrolytes maintained within normal limits  Description: INTERVENTIONS:  - Monitor labs and assess patient for signs and symptoms of electrolyte imbalances  - Administer electrolyte replacement as ordered  - Monitor response to electrolyte replacements, including repeat lab results as appropriate  - Instruct patient on fluid and nutrition as appropriate  Outcome: Progressing  Goal: Fluid balance maintained  Description: INTERVENTIONS:  - Monitor labs   - Monitor I/O and WT  - Instruct patient on fluid and nutrition as appropriate  - Assess for signs & symptoms of volume excess or deficit  Outcome: Progressing  Goal: Glucose maintained within target range  Description: INTERVENTIONS:  - Monitor Blood Glucose as ordered  - Assess for signs and symptoms of hyperglycemia and hypoglycemia  - Administer ordered medications to maintain glucose within target range  - Assess nutritional intake and initiate nutrition service referral as needed  Outcome: Progressing     Problem: SKIN/TISSUE INTEGRITY - ADULT  Goal: Skin Integrity remains intact(Skin Breakdown Prevention)  Description: Assess:  -Perform Jorge Luis assessment every   -Clean and moisturize skin every   -Inspect skin when repositioning, toileting, and assisting with ADLS  -Assess under medical devices such as  every   -Assess extremities for adequate circulation and sensation     Bed Management:  -Have minimal linens on bed & keep smooth, unwrinkled  -Change linens as needed when moist or perspiring  -Avoid sitting or lying in one position for more than  hours while in bed  -Keep HOB at degrees Toileting:  -Offer bedside commode  -Assess for incontinence every   -Use incontinent care products after each incontinent episode such as     Activity:  -Mobilize patient  times a day  -Encourage activity and walks on unit  -Encourage or provide ROM exercises   -Turn and reposition patient every  Hours  -Use appropriate equipment to lift or move patient in bed  -Instruct/ Assist with weight shifting every  when out of bed in chair  -Consider limitation of chair time  hour intervals    Skin Care:  -Avoid use of baby powder, tape, friction and shearing, hot water or constrictive clothing  -Relieve pressure over bony prominences using   -Do not massage red bony areas    Next Steps:  -Teach patient strategies to minimize risks such as    -Consider consults to  interdisciplinary teams such as   Outcome: Progressing  Goal: Incision(s), wounds(s) or drain site(s) healing without S/S of infection  Description: INTERVENTIONS  - Assess and document dressing, incision, wound bed, drain sites and surrounding tissue  - Provide patient and family education  - Perform skin care/dressing changes every   Outcome: Progressing  Goal: Pressure injury heals and does not worsen  Description: Interventions:  - Implement low air loss mattress or specialty surface (Criteria met)  - Apply silicone foam dressing  - Instruct/assist with weight shifting every  minutes when in chair   - Limit chair time to  hour intervals  - Use special pressure reducing interventions such as  when in chair   - Apply fecal or urinary incontinence containment device   - Perform passive or active ROM every   - Turn and reposition patient & offload bony prominences every  hours   - Utilize friction reducing device or surface for transfers   - Consider consults to  interdisciplinary teams such as   - Use incontinent care products after each incontinent episode such   - Consider nutrition services referral as needed  Outcome: Progressing     Problem: MUSCULOSKELETAL - ADULT  Goal: Maintain or return mobility to safest level of function  Description: INTERVENTIONS:  - Assess patient's ability to carry out ADLs; assess patient's baseline for ADL function and identify physical deficits which impact ability to perform ADLs (bathing, care of mouth/teeth, toileting, grooming, dressing, etc )  - Assess/evaluate cause of self-care deficits   - Assess range of motion  - Assess patient's mobility  - Assess patient's need for assistive devices and provide as appropriate  - Encourage maximum independence but intervene and supervise when necessary  - Involve family in performance of ADLs  - Assess for home care needs following discharge   - Consider OT consult to assist with ADL evaluation and planning for discharge  - Provide patient education as appropriate  Outcome: Progressing  Goal: Maintain proper alignment of affected body part  Description: INTERVENTIONS:  - Support, maintain and protect limb and body alignment  - Provide patient/ family with appropriate education  Outcome: Progressing

## 2022-05-11 NOTE — ASSESSMENT & PLAN NOTE
· Positive  urinary frequency since Friday without fever  · UA (+) and now on Rocephin   · Monitor while awaiting urine cx result

## 2022-05-11 NOTE — PROGRESS NOTES
[facility]  Progress Note - Misty Rios 1930, 80 y o  female MRN: [id number]  Unit/Bed#: -Lora Encounter: [id number]    VTE Pharmacologic Prophylaxis: VTE Score: 8 Moderate Risk (Score 3-4) - Pharmacological DVT Prophylaxis Ordered: enoxaparin (Lovenox)  Patient Centered Rounds: I evaluated the patient without nursing staff present due to Nurse updated  Discussions with Specialists or Other Care Team Provider: CM    Education and Discussions with Family / Patient: Updated  (son) via phone  Time Spent for Care: 30 minutes  More than 50% of total time spent on counseling and coordination of care as described above  Current Length of Stay: 9 day(s)  Current Patient Status: Inpatient   Certification Statement: The patient will continue to require additional inpatient hospital stay due to Placement  Discharge Plan: Anticipate discharge in >72 hrs to rehab facility  Code Status: Level 3 - DNAR and DNI    Subjective:   No Complaint    Objective:     Vitals:   Temp (24hrs), Av 2 °F (37 3 °C), Min:98 7 °F (37 1 °C), Max:99 6 °F (37 6 °C)    Temp:  [98 7 °F (37 1 °C)-99 6 °F (37 6 °C)] 99 6 °F (37 6 °C)  HR:  [] 63  Resp:  [18-20] 18  BP: (123-142)/(70-93) 123/70  SpO2:  [91 %-96 %] 91 %  Body mass index is 24 61 kg/m²  Input and Output Summary (last 24 hours):      Intake/Output Summary (Last 24 hours) at 2022 0853  Last data filed at 5/10/2022 2115  Gross per 24 hour   Intake 320 ml   Output 500 ml   Net -180 ml       Physical Exam:     GEN: No acute distress, comfortable, elderly, chronically ill  HEEENT: No JVD, PERRLA, no scleral icterus  RESP: Lungs clear to auscultation bilaterally  CV: RRR, +s1/s2   ABD: SOFT NON TENDER, POSITIVE BOWEL SOUNDS, NO DISTENTION  PSYCH: apparent dementia  NEURO:  NO FOCAL DEFICITS  SKIN: NO RASH  EXTREM: NO EDEMA    Additional Data:     Labs:  Results from last 7 days   Lab Units 22  0446   WBC Thousand/uL 12 72*   HEMOGLOBIN g/dL 12 7   HEMATOCRIT % 38 8   PLATELETS Thousands/uL 221   NEUTROS PCT % 76*   LYMPHS PCT % 12*   MONOS PCT % 10   EOS PCT % 2     Results from last 7 days   Lab Units 05/11/22  0446   SODIUM mmol/L 134*   POTASSIUM mmol/L 4 0   CHLORIDE mmol/L 101   CO2 mmol/L 25   BUN mg/dL 23   CREATININE mg/dL 0 99   ANION GAP mmol/L 8   CALCIUM mg/dL 8 4   ALBUMIN g/dL 2 8*   TOTAL BILIRUBIN mg/dL 0 46   ALK PHOS U/L 121*   ALT U/L 43   AST U/L 48*   GLUCOSE RANDOM mg/dL 165*         Results from last 7 days   Lab Units 05/11/22  0729 05/10/22  2033 05/10/22  1629 05/10/22  1056 05/10/22  0714 05/10/22  0314 05/10/22  0135 05/09/22  2114 05/09/22  1529 05/09/22  1057 05/09/22  0658 05/08/22  2114   POC GLUCOSE mg/dl 199* 179* 109 240* 169* 140 81 75 130 185* 205* 179*               Lines/Drains:  Invasive Devices  Report    Peripheral Intravenous Line  Duration           Peripheral IV 05/10/22 Left;Dorsal (posterior) Forearm 1 day          Drain  Duration           External Urinary Catheter <1 day                      Imaging: No pertinent imaging reviewed      Recent Cultures (last 7 days):         Last 24 Hours Medication List:   Current Facility-Administered Medications   Medication Dose Route Frequency Provider Last Rate    aspirin  81 mg Oral Daily [place], [place]      atorvastatin  40 mg Oral QPM [place], [place]      cefTRIAXone  1,000 mg Intravenous Q24H Paulino Khalil MD 1,000 mg (05/10/22 1950)    clopidogrel  75 mg Oral Daily BRIAN Rosen      [START ON 5/12/2022] enoxaparin  30 mg Subcutaneous Daily Makenzie Rich MD      insulin aspart protamine-insulin aspart  15 Units Subcutaneous QPM Bozena Harper MD      insulin aspart protamine-insulin aspart  20 Units Subcutaneous QAM Bozena Harper MD      insulin lispro  1-5 Units Subcutaneous TID AC [place], [place]      insulin lispro  1-5 Units Subcutaneous [place], [place]  OLANZapine  5 mg Oral HS PRN Maritza Andino MD          Today, Patient Was Seen By: Arian Spencer MD    **Please Note: This note may have been constructed using a voice recognition system  **Primary Care Provider: Christy Irving MD   Date and time admitted to hospital: 5/2/2022  8:03 PM    * Cerebrovascular accident (CVA) due to stenosis of right posterior cerebral artery ([facility] [address] )  Assessment & Plan  · Initial left sided weakness   · CTA without evidence for acute infarct, bleed, or mass effect, however is showing occlusion vs severe stenosis of the right posterior cerebral artery and 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  · CTA of the head and neck showed severe stenosis/occlusion of the right PCA, with 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, her labs revealed significant hyperlipidemia, and on examination patient had subtle left-sided weakness with neglect and field cut to suspect a right PCA infarct  ·   Patient admitted on stroke pathway  · MRI of the brain done subsequently confirms the presence of a right PCA infarct of recent duration  · DAPT for 21 dys after which ASA alone will be continued  · Continue statin therapy, ASA and atorvastatin  · Control blood pressure/ blood sugar   · PT/OT recommended inpatient rehab      · Neuro signed off and outpatient F/U recommended   · Possible DC once placed        Urinary frequency  Assessment & Plan  · Positive  urinary frequency since Friday without fever  · UA (+) and now on Rocephin   · Monitor while awaiting urine cx result         UTI (urinary tract infection)  Assessment & Plan  UA (+)  Urine cx pending   iv Rocephin   Patient has urinary frequency since admission     Hyponatremia  Assessment & Plan  Resolved    Late onset Alzheimer's disease without behavioral disturbance ([facility] [address] )  Assessment & Plan  · Had episodes of confusion, not able to answer questions properly but compared to admission the agitation has significantly decreased  · Answers simple questions  · Back to mental baseline per son   · No restraint in the past 48 hours and not agitated  · Case management  On board for placement  · No need of any restraints or one-to-one observation at this time  · Not fully vaccinated against covid and has received one dose while in house  · Will dc once placed      Chronic diastolic congestive heart failure ([address] )  Assessment & Plan  Wt Readings from Last 3 Encounters:   05/03/22 57 2 kg (126 lb)   11/21/20 57 6 kg (127 lb)   05/31/20 61 9 kg (136 lb 7 4 oz)       · Echocardiogram done on this admission:  Ejection fraction is 60%  Wall motion is normal   Moderate aortic valve stenosis  Mild mitral valve regurgitation  Mild tricuspid valve regurgitation  · Appears euvolemic on exam      Type 2 diabetes mellitus without complication, with long-term current use of insulin ([facility])  Assessment & Plan  Lab Results   Component Value Date    HGBA1C 8 2 (H) 05/03/2022     · Presently, blood sugars are at acceptable levels  · Continue NovoLog 70/30 20 units in the morning and 15 units in the evening  · Correctional SSI  · Hypoglycemia protocol; diabetic diet ordered  · Adjust treatment accordingly  Initiate Treatment: Triamcinolone Cream - Apply To Affected Areas Twice Daily for Two weeks then as needed for flares Detail Level: Zone

## 2022-05-11 NOTE — ASSESSMENT & PLAN NOTE
· Had episodes of confusion, not able to answer questions properly but compared to admission the agitation has significantly decreased  · Answers simple questions  · Back to mental baseline per son   · No restraint in the past 48 hours and not agitated  · Case management  On board for placement  · No need of any restraints or one-to-one observation at this time    · Not fully vaccinated against covid and has received one dose while in house  · Will dc once placed

## 2022-05-12 ENCOUNTER — TELEPHONE (OUTPATIENT)
Dept: OTHER | Facility: OTHER | Age: 87
End: 2022-05-12

## 2022-05-12 VITALS
TEMPERATURE: 98.1 F | OXYGEN SATURATION: 97 % | HEIGHT: 60 IN | RESPIRATION RATE: 18 BRPM | DIASTOLIC BLOOD PRESSURE: 77 MMHG | HEART RATE: 49 BPM | WEIGHT: 126 LBS | SYSTOLIC BLOOD PRESSURE: 151 MMHG | BODY MASS INDEX: 24.74 KG/M2

## 2022-05-12 LAB
ALBUMIN SERPL BCP-MCNC: 2.6 G/DL (ref 3.5–5)
ALP SERPL-CCNC: 113 U/L (ref 46–116)
ALT SERPL W P-5'-P-CCNC: 37 U/L (ref 12–78)
ANION GAP SERPL CALCULATED.3IONS-SCNC: 8 MMOL/L (ref 4–13)
AST SERPL W P-5'-P-CCNC: 34 U/L (ref 5–45)
BASOPHILS # BLD AUTO: 0.04 THOUSANDS/ΜL (ref 0–0.1)
BASOPHILS NFR BLD AUTO: 0 % (ref 0–1)
BILIRUB SERPL-MCNC: 0.46 MG/DL (ref 0.2–1)
BUN SERPL-MCNC: 20 MG/DL (ref 5–25)
CALCIUM ALBUM COR SERPL-MCNC: 9.7 MG/DL (ref 8.3–10.1)
CALCIUM SERPL-MCNC: 8.6 MG/DL (ref 8.3–10.1)
CHLORIDE SERPL-SCNC: 101 MMOL/L (ref 100–108)
CO2 SERPL-SCNC: 26 MMOL/L (ref 21–32)
CREAT SERPL-MCNC: 0.96 MG/DL (ref 0.6–1.3)
EOSINOPHIL # BLD AUTO: 0.23 THOUSAND/ΜL (ref 0–0.61)
EOSINOPHIL NFR BLD AUTO: 2 % (ref 0–6)
ERYTHROCYTE [DISTWIDTH] IN BLOOD BY AUTOMATED COUNT: 13 % (ref 11.6–15.1)
GFR SERPL CREATININE-BSD FRML MDRD: 51 ML/MIN/1.73SQ M
GLUCOSE SERPL-MCNC: 179 MG/DL (ref 65–140)
GLUCOSE SERPL-MCNC: 207 MG/DL (ref 65–140)
GLUCOSE SERPL-MCNC: 317 MG/DL (ref 65–140)
GLUCOSE SERPL-MCNC: 411 MG/DL (ref 65–140)
GLUCOSE SERPL-MCNC: 486 MG/DL (ref 65–140)
HCT VFR BLD AUTO: 37.8 % (ref 34.8–46.1)
HGB BLD-MCNC: 12 G/DL (ref 11.5–15.4)
IMM GRANULOCYTES # BLD AUTO: 0.04 THOUSAND/UL (ref 0–0.2)
IMM GRANULOCYTES NFR BLD AUTO: 0 % (ref 0–2)
LACTATE SERPL-SCNC: 0.7 MMOL/L (ref 0.5–2)
LYMPHOCYTES # BLD AUTO: 1.57 THOUSANDS/ΜL (ref 0.6–4.47)
LYMPHOCYTES NFR BLD AUTO: 16 % (ref 14–44)
MCH RBC QN AUTO: 28.3 PG (ref 26.8–34.3)
MCHC RBC AUTO-ENTMCNC: 31.7 G/DL (ref 31.4–37.4)
MCV RBC AUTO: 89 FL (ref 82–98)
MONOCYTES # BLD AUTO: 1.09 THOUSAND/ΜL (ref 0.17–1.22)
MONOCYTES NFR BLD AUTO: 11 % (ref 4–12)
NEUTROPHILS # BLD AUTO: 6.71 THOUSANDS/ΜL (ref 1.85–7.62)
NEUTS SEG NFR BLD AUTO: 71 % (ref 43–75)
NRBC BLD AUTO-RTO: 0 /100 WBCS
PLATELET # BLD AUTO: 193 THOUSANDS/UL (ref 149–390)
PMV BLD AUTO: 11.7 FL (ref 8.9–12.7)
POTASSIUM SERPL-SCNC: 3.8 MMOL/L (ref 3.5–5.3)
PROT SERPL-MCNC: 6.1 G/DL (ref 6.4–8.2)
RBC # BLD AUTO: 4.24 MILLION/UL (ref 3.81–5.12)
SODIUM SERPL-SCNC: 135 MMOL/L (ref 136–145)
WBC # BLD AUTO: 9.68 THOUSAND/UL (ref 4.31–10.16)

## 2022-05-12 PROCEDURE — 82948 REAGENT STRIP/BLOOD GLUCOSE: CPT

## 2022-05-12 PROCEDURE — 99239 HOSP IP/OBS DSCHRG MGMT >30: CPT | Performed by: HOSPITALIST

## 2022-05-12 PROCEDURE — NC001 PR NO CHARGE: Performed by: HOSPITALIST

## 2022-05-12 PROCEDURE — 97530 THERAPEUTIC ACTIVITIES: CPT

## 2022-05-12 PROCEDURE — 83605 ASSAY OF LACTIC ACID: CPT | Performed by: PHYSICIAN ASSISTANT

## 2022-05-12 PROCEDURE — 80053 COMPREHEN METABOLIC PANEL: CPT | Performed by: HOSPITALIST

## 2022-05-12 PROCEDURE — 85025 COMPLETE CBC W/AUTO DIFF WBC: CPT | Performed by: HOSPITALIST

## 2022-05-12 PROCEDURE — 87040 BLOOD CULTURE FOR BACTERIA: CPT | Performed by: PHYSICIAN ASSISTANT

## 2022-05-12 RX ORDER — INSULIN ASPART 100 [IU]/ML
10 INJECTION, SUSPENSION SUBCUTANEOUS EVERY EVENING
Status: DISCONTINUED | OUTPATIENT
Start: 2022-05-12 | End: 2022-05-12 | Stop reason: HOSPADM

## 2022-05-12 RX ORDER — INSULIN ASPART 100 [IU]/ML
15 INJECTION, SUSPENSION SUBCUTANEOUS EVERY MORNING
Qty: 1 ML | Refills: 0
Start: 2022-05-13

## 2022-05-12 RX ORDER — INSULIN ASPART 100 [IU]/ML
15 INJECTION, SUSPENSION SUBCUTANEOUS EVERY MORNING
Status: DISCONTINUED | OUTPATIENT
Start: 2022-05-12 | End: 2022-05-12 | Stop reason: HOSPADM

## 2022-05-12 RX ORDER — INSULIN ASPART 100 [IU]/ML
10 INJECTION, SUSPENSION SUBCUTANEOUS EVERY EVENING
Qty: 1 ML | Refills: 0
Start: 2022-05-12 | End: 2022-06-08

## 2022-05-12 RX ORDER — INSULIN LISPRO 100 [IU]/ML
1-5 INJECTION, SOLUTION INTRAVENOUS; SUBCUTANEOUS
Qty: 3 ML | Refills: 0
Start: 2022-05-12

## 2022-05-12 RX ORDER — INSULIN ASPART 100 [IU]/ML
15 INJECTION, SUSPENSION SUBCUTANEOUS EVERY MORNING
Status: DISCONTINUED | OUTPATIENT
Start: 2022-05-13 | End: 2022-05-12

## 2022-05-12 RX ADMIN — INSULIN LISPRO 1 UNITS: 100 INJECTION, SOLUTION INTRAVENOUS; SUBCUTANEOUS at 09:36

## 2022-05-12 RX ADMIN — INSULIN LISPRO 5 UNITS: 100 INJECTION, SOLUTION INTRAVENOUS; SUBCUTANEOUS at 13:08

## 2022-05-12 RX ADMIN — CLOPIDOGREL BISULFATE 75 MG: 75 TABLET ORAL at 09:36

## 2022-05-12 RX ADMIN — ASPIRIN 81 MG: 81 TABLET, CHEWABLE ORAL at 09:36

## 2022-05-12 RX ADMIN — ENOXAPARIN SODIUM 30 MG: 30 INJECTION SUBCUTANEOUS at 09:35

## 2022-05-12 RX ADMIN — INSULIN ASPART 15 UNITS: 100 INJECTION, SUSPENSION SUBCUTANEOUS at 10:28

## 2022-05-12 NOTE — PLAN OF CARE
Problem: PHYSICAL THERAPY ADULT  Goal: Performs mobility at highest level of function for planned discharge setting  See evaluation for individualized goals  Description: Treatment/Interventions: Functional transfer training,LE strengthening/ROM,Therapeutic exercise,Endurance training,Cognitive reorientation,Patient/family training,Bed mobility,Gait training,Spoke to nursing,OT          See flowsheet documentation for full assessment, interventions and recommendations  Outcome: Not Progressing  Note: Prognosis: Fair  Problem List: Decreased strength, Decreased endurance, Impaired balance, Decreased mobility, Decreased cognition, Pain  Assessment: Chart reviewed  Patient was received supine in bed in NAD and agreeable to PT session  Today's PT treatment session consisted of therapeutic activity for facilitation of transitional movements and safe performance of correct technique for bed mobility, sit to stand transfers, and stand pivot transfers and therapeutic exercise to increase lower extremity muscle strength  In comparison to the previous session the patient has not made progress as evident by requiring increased assistance with sit to stand transfers  Pt was able to perform a stand pivot transfer with assist of two  Pt continues to exhibit a left lateral trunk lean in standing and requires cues and assist for an upright posture  Pt tolerated therapeutic exercise well  Overall, patient tolerated today's session well  Patient's prognosis for achieving their STG's is fair  Pt's STG's were updated this session  Co-treatment was performed with OT secondary to complex medical condition of pt  Pt requires assist of two to achieve and maintain transitional movements; requiring the need of skilled therapeutic intervention of two therapists to achieve the delivery of services  PT/OT goals were addressed separately   PT intervention continues to be appropriate as the patient continues to be limited by pain, decreased lower extremity strength, impaired balance, decreased endurance, and decreased functional mobility  Continue to recommend STR  PT to continue to see patient in order to address the deficits listed above and provide interventions consistent with the POC in order to achieve STG's and optimize the patient's independence with functional mobility  Barriers to Discharge: Inaccessible home environment, Decreased caregiver support     PT Discharge Recommendation: Post acute rehabilitation services     See flowsheet documentation for full assessment

## 2022-05-12 NOTE — DISCHARGE SUMMARY
[address]  DISCHARGE SUMMARY  Medical Problems             Resolved Problems  Date Reviewed: 5/12/2022   None               Discharging Physician / Practitioner: Steve Pulido MD  PCP: Fior Richardson MD  Admission Date:   Admission Orders (From admission, onward)     Ordered        05/02/22 8710  Inpatient Admission  Once                      Discharge Date: 05/12/22    Consultations During Hospital Stay:  · Neurology    Procedures Performed:   · None    Significant Findings / Test Results:   --CXR (5/2/22): No acute cardiopulmonary disease  --CTA head (w/wo): No evidence of acute vascular territorial infarction, intracranial hemorrhage or mass effect  Occlusion versus severe stenosis of the right posterior cerebral artery distal to the P1 segment, of uncertain chronicity  Multifocal moderate to severe stenoses in the carotid siphons  --XR Pelvis, Abdomen and femur(5/3/22): No radiopaque foreign bodies identified within the abdomen, pelvis or proximal thighs   --MRI Brain(5/3/22): Recent infarcts are identified in the right posterior cerebral artery which diffusion to include the medial temporal lobe/hippocampus, occipital lobe, and right centrum semiovale  Chronic lacunar infarcts bilateral cerebellum      Incidental Findings:   · Acute CVA on MRI brain     Test Results Pending at Discharge (will require follow up): · None     Outpatient Tests Requested:  · None    Complications:  None    Reason for Admission: Left sided weakness    Hospital Course:      80 y o  female with a PMH of T2DM, alzheimers, and CHF admitted with left sided weakness  and difficulty ambulating  Found to have acute CVA on her MRI brain  Person, patient started having symptoms a few days prior to ED arrival including  drowsiness, dizziness, and  left-sided weakness    Patient admitted on stroke pathway (including ASA, plavix for 3 weeks starting from 5/3/22 till 5/24/22 after which , plavix will be discontinued and ASA 81 mg po daily to be continued  ECHO, CTA head/neck, neuro checks, telemetry monitoring, statin,  MRI brain, neuro checks, PT/OT/ST and neurology consult) was initiated  CTH was unremarkable  CTA of the head and neck showed severe stenosis/occlusion of the right PCA, with 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  Labs revealed significant hyperlipidemia, and on examination patient had subtle left-sided weakness with neglect and field cut to suspect a right PCA infarct  MRI of the brain done subsequently confirms the presence of a right PCA infarct of recent duration, patient started on dual anti-platelet therapy, statin therapy  Blood pressure and sugar were controlled  PT/OT and speech were consulted and rehab was recommended  She was found to have UTI during her stay with positive UA and mild infectious encephalopathy secondary to UTI  Urine cx is pending  She has been given 3 days of IV Rocephin and will be continued even after dc is rehab for another 4 days for a total of 7 days  She was also found to have hyponatremia during her hospitalization  On exam today, VSS,  she is at her new baseline with left sided weakness and right gaze preference  Tolerated diet well and works with PT/OT  Will be discharged to [facility] [address] [facility] Heather Turcios) informed and he is in agreement with the plan    Condition at Discharge: fair    Discharge Day Visit / Exam:   Subjective:  No complaint  Vitals: Blood Pressure: 151/77 (05/12/22 1543)  Pulse: (!) 49 (05/12/22 1543)  Temperature: 98 1 °F (36 7 °C) (05/12/22 1543)  Temp Source: Axillary (05/12/22 0700)  Respirations: 18 (05/12/22 1012)  Height: 5' (152 4 cm) (05/03/22 1155)  Weight - Scale: 57 2 kg (126 lb) (05/03/22 1155)  SpO2: 97 % (05/12/22 1543)  Exam:   Physical Exam  Vitals and nursing note reviewed  Constitutional:       General: She is not in acute distress  Appearance: She is not ill-appearing  Comments: frail appearing female   HENT:      Head: Normocephalic  Mouth/Throat:      Mouth: Mucous membranes are moist       Pharynx: Oropharynx is clear  Eyes:      General: No scleral icterus  Right eye: No discharge  Left eye: No discharge  Extraocular Movements: Extraocular movements intact  Conjunctiva/sclera: Conjunctivae normal    Cardiovascular:      Rate and Rhythm: Normal rate  Pulmonary:      Effort: Pulmonary effort is normal  No respiratory distress  Musculoskeletal:         General: Normal range of motion  Cervical back: Normal range of motion  Skin:     General: Skin is warm and dry  Coloration: Skin is not jaundiced or pale  Comments: Generalized bruising and abrasions   Neurological:      Mental Status: She is alert  Coordination: Finger-nose-finger test: Right normal, left dysmetria  Deep Tendon Reflexes:      Reflex Scores:       Bicep reflexes are 1+ on the right side and 1+ on the left side  Brachioradialis reflexes are 1+ on the right side and 1+ on the left side  Patellar reflexes are 1+ on the right side and 1+ on the left side  Psychiatric:         Mood and Affect: Mood normal      Discussion with Family: Updated  (son) via phone  Discharge instructions/Information to patient and family:   See after visit summary for information provided to patient and family  Provisions for Follow-Up Care:  See after visit summary for information related to follow-up care and any pertinent home health orders  Disposition:   [facility]    Planned Readmission: no     Discharge Statement:  I spent 35 minutes discharging the patient  This time was spent on the day of discharge  I had direct contact with the patient on the day of discharge   Greater than 50% of the total time was spent examining patient, answering all patient questions, arranging and discussing plan of care with patient as well as directly providing post-discharge instructions  Additional time then spent on discharge activities  Discharge Medications:  See after visit summary for reconciled discharge medications provided to patient and/or family  **Please Note: This note may have been constructed using a voice recognition system** - Constance Gowers 8/30/1930, 80 y o  female MRN: [id number]  Unit/Bed#: -01 Encounter: [id number]  Primary Care Provider: Martina Hernandez MD   Date and time admitted to hospital: 5/2/2022  8:03 PM    * Cerebrovascular accident (CVA) due to stenosis of right posterior cerebral artery ([address] )  Assessment & Plan  · Left sided weakness   · CTA without evidence for acute infarct, bleed, or mass effect, however is showing occlusion vs severe stenosis of the right posterior cerebral artery and 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  · CTA of the head and neck showed severe stenosis/occlusion of the right PCA, with 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, her labs revealed significant hyperlipidemia, and on examination patient had subtle left-sided weakness with neglect and field cut to suspect a right PCA infarct  ·   Patient admitted on stroke pathway  · MRI of the brain done subsequently confirms the presence of a right PCA infarct of recent duration  · DAPT for 21 dys after which ASA alone will be continued  · Continue statin therapy, ASA and atorvastatin  · Control blood pressure/ blood sugar   · PT/OT recommended inpatient rehab  · Neuro signed off and outpatient F/U recommended   · DC today to [address] [facility]        Urinary frequency  Assessment & Plan  · Positive  urinary frequency   · UA (+) and now on Rocephin #3/7  · Urine culture is pending         UTI (urinary tract infection)  Assessment & Plan  UA (+)  Urine cx pending    iv Rocephin       Hyponatremia  Assessment & Plan  Resolved    Late onset Alzheimer's disease without behavioral disturbance ([facility] )  Assessment & Plan  · Had episodes of confusion, not able to answer questions properly but compared to admission the agitation has significantly decreased  · Answers simple questions  · Back to mental baseline per son   · No restraint in the past 72 hours and not agitated  · Case management on board and appreciated  · No need of any restraints or one-to-one observation   · Not fully vaccinated against covid and has received one dose while in house  · Will dc to [address] [place]      Chronic diastolic congestive heart failure ([facility] )  Assessment & Plan  Wt Readings from Last 3 Encounters:   05/03/22 57 2 kg (126 lb)   11/21/20 57 6 kg (127 lb)   05/31/20 61 9 kg (136 lb 7 4 oz)       · Echocardiogram done on this admission:  Ejection fraction is 60%  Wall motion is normal   Moderate aortic valve stenosis  Mild mitral valve regurgitation  Mild tricuspid valve regurgitation  · Appears euvolemic on exam      Type 2 diabetes mellitus without complication, with long-term current use of insulin ([facility])  Assessment & Plan  Lab Results   Component Value Date    HGBA1C 8 2 (H) 05/03/2022     · Presently, blood sugars are at acceptable levels  · Continue NovoLog 70/30 15 units in the morning and 15 units in the evening and 10 units at dinner  · Correctional SSI  · Hypoglycemia protocol; diabetic diet ordered  · Adjust treatment accordingly         Right sided weakness  Assessment & Plan  Due to acute CVA    Ambulatory dysfunction  Assessment & Plan  PT/OT in the rehab

## 2022-05-12 NOTE — ASSESSMENT & PLAN NOTE
· Left sided weakness   · CTA without evidence for acute infarct, bleed, or mass effect, however is showing occlusion vs severe stenosis of the right posterior cerebral artery and 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  · CTA of the head and neck showed severe stenosis/occlusion of the right PCA, with 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, her labs revealed significant hyperlipidemia, and on examination patient had subtle left-sided weakness with neglect and field cut to suspect a right PCA infarct  ·   Patient admitted on stroke pathway  · MRI of the brain done subsequently confirms the presence of a right PCA infarct of recent duration  · DAPT for 21 dys after which ASA alone will be continued  · Continue statin therapy, ASA and atorvastatin  · Control blood pressure/ blood sugar   · PT/OT recommended inpatient rehab      · Neuro signed off and outpatient F/U recommended   · Possible DC once placed

## 2022-05-12 NOTE — ASSESSMENT & PLAN NOTE
· Had episodes of confusion, not able to answer questions properly but compared to admission the agitation has significantly decreased  · Answers simple questions    · Back to mental baseline per son   · No restraint in the past 72 hours and not agitated  · Case management on board and appreciated  · No need of any restraints or one-to-one observation   · Not fully vaccinated against covid and has received one dose while in house  · Will dc to [facility]

## 2022-05-12 NOTE — PLAN OF CARE
Problem: OCCUPATIONAL THERAPY ADULT  Goal: Performs self-care activities at highest level of function for planned discharge setting  See evaluation for individualized goals  Description: Treatment Interventions: ADL retraining, Functional transfer training, UE strengthening/ROM, Endurance training, Patient/family training, Equipment evaluation/education, Compensatory technique education, Continued evaluation, Energy conservation, Activityengagement          See flowsheet documentation for full assessment, interventions and recommendations  Note: Limitation: Decreased ADL status, Decreased UE strength, Decreased endurance, Decreased fine motor control, Decreased high-level ADLs  Prognosis: Good  Assessment: Patient participated in Skilled OT session this date with interventions consisting of  therapeutic activities to: increase activity tolerance, increase cardiovascular endurance , elicit righting and equilibrium reactions for improved postural control and alignment during transitional movements, increase dynamic sit/ stand balance during functional activity , increase postural control and increase OOB/ sitting tolerance   Patient agreeable to OT treatment session, upon arrival patient was found supine in bed, alert, responsive  and in no apparent distress  Patient completed UE therapeutic exercises, sitting tolerance at edge of bed, transfer training bed to [address] with max assist of 2 (stand pivot technique) Patient required assist of 2 skilled therapists to facilitate neuromuscular components of movement, provide weight bearing assistance to increase self performance in transitional movements and achieve functional sitting/ standing activities  Patient requiring verbal cues for safety, verbal cues for pacing thru activity steps and cognitive assistance to anticipate next step   Patient continues to be functioning below baseline level, occupational performance remains limited secondary to factors listed above and increased risk for falls and injury  From OT standpoint, recommendation at time of d/c would be Short Term Rehab     Patient to benefit from continued Occupational Therapy treatment while in the hospital to address deficits as defined above and maximize level of functional     OT Discharge Recommendation: Post acute rehabilitation services

## 2022-05-12 NOTE — ASSESSMENT & PLAN NOTE
Lab Results   Component Value Date    HGBA1C 8 2 (H) 05/03/2022     · Presently, blood sugars are at acceptable levels  · Continue NovoLog 70/30 15 units in the morning and 15 units in the evening and 10 units at dinner  · Correctional SSI  · Hypoglycemia protocol; diabetic diet ordered  · Adjust treatment accordingly

## 2022-05-12 NOTE — CASE MANAGEMENT
Case Management Discharge Planning Note    Patient name Charisse Clement  Location [address] 212/-01 MRN [id number]  : 1930 Date 2022       Current Admission Date: 2022  Current Admission Diagnosis:Cerebrovascular accident (CVA) due to stenosis of right posterior cerebral artery [facility])   Patient Active Problem List    Diagnosis Date Noted    Ambulatory dysfunction     Right sided weakness     UTI (urinary tract infection) 05/10/2022    Hyponatremia 2022    Cerebrovascular accident (CVA) due to stenosis of right posterior cerebral artery ([facility] ) 2022    Urinary frequency 2022    Pressure injury of left heel, unstageable ([facility] ) 2020    Syncope 10/25/2019    Pressure injury of left buttock, stage 1 10/25/2019    Late onset Alzheimer's disease without behavioral disturbance ([facility] ) 10/25/2019    Need for immunization against influenza 2018    Type 2 diabetes mellitus without complication, with long-term current use of insulin ([facility] ) 2018    Chronic diastolic congestive heart failure ([facility] ) 2018    Aortic valve disorder 2018      LOS (days): 10  Geometric Mean LOS (GMLOS) (days): 2 90  Days to GMLOS:-6 7     OBJECTIVE:  Risk of Unplanned Readmission Score: 16 9         Current admission status: Inpatient   Preferred Pharmacy:   [facility] [address] [place] [address] [address] [place] [id number]  Phone: [phone] Fax: [phone]    Primary Care Provider: Christiane Ospina MD    Primary Insurance: MEDICARE  Secondary [facility]    DISCHARGE DETAILS:    Discharge planning discussed with[de-identified] Patient's Son  Freedom of Choice: Yes  Comments - Freedom of Choice: CM discussed freedom of choice as it pertains to discharge planning  CM reported that [facility] is no longer able to accept due to not having non-vaccinated beds  CM reported that [address] is able to accept patient today   Usman's son reported that he prefers this facility, because it is closer to his work  CM contacted family/caregiver?: Yes  Were Treatment Team discharge recommendations reviewed with patient/caregiver?: Yes  Did patient/caregiver verbalize understanding of patient care needs?: Yes  Were patient/caregiver advised of the risks associated with not following Treatment Team discharge recommendations?: Yes    Contacts  Patient Contacts: Omid Olivares  Relationship to Patient[de-identified] Family  Contact Method: Phone  Phone Number: [phone]  Reason/Outcome: Continuity of Care, Discharge [address]         Is the patient interested in [facility] at discharge?: No    DME Referral Provided  Referral made for DME?: No    Other Referral/Resources/Interventions Provided:  Interventions: Short Term Rehab, Transportation  Referral Comments: CM received TT from [address] [place] [address] Road from [address]  reporting that they can accept patient today at [facility]  [address] [place] [address] reported that she does not need a Covid test prior to discharge  CM then completed medical necessity and placed in patient's binder  CM placed an additional copy in medical records  Would you like to participate in our [facility] service program?  : No - Declined    Treatment Team Recommendation: Short Term Rehab  Discharge Destination Plan[de-identified] Short Term Rehab  Transport at Discharge : BLS Ambulance  Dispatcher Contacted: Yes  Number/Name of Dispatcher: CM sent a referral to [facility] requesting BLS transport for 2 pm or later today  [facility] to schedule and contact CM with transportation time      Accepting Facility Name, [address] : [address]   Receiving Facility/Agency Phone Number: [phone]

## 2022-05-12 NOTE — ASSESSMENT & PLAN NOTE
· Left sided weakness   · CTA without evidence for acute infarct, bleed, or mass effect, however is showing occlusion vs severe stenosis of the right posterior cerebral artery and 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  · CTA of the head and neck showed severe stenosis/occlusion of the right PCA, with 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, her labs revealed significant hyperlipidemia, and on examination patient had subtle left-sided weakness with neglect and field cut to suspect a right PCA infarct  ·   Patient admitted on stroke pathway  · MRI of the brain done subsequently confirms the presence of a right PCA infarct of recent duration  · DAPT for 21 dys after which ASA alone will be continued  · Continue statin therapy, ASA and atorvastatin  · Control blood pressure/ blood sugar   · PT/OT recommended inpatient rehab      · Neuro signed off and outpatient F/U recommended   · DC today to [address] [facility]

## 2022-05-12 NOTE — QUICK NOTE
Notified by RN patient meeting sepsis criteria due to leukocytosis and temperature <36 degree C    Most likely in setting of UTI  Continue antibiotic  Blood pressure stable, for now will hold off on IV fluids  Check lactic acid blood culture times to status post IV antibiotic use

## 2022-05-12 NOTE — CASE MANAGEMENT
Case Management Discharge Planning Note    Patient name Misty Rios  Location [address] 212/-01 MRN [id number]  : 1930 Date 2022       Current Admission Date: 2022  Current Admission Diagnosis:Cerebrovascular accident (CVA) due to stenosis of right posterior cerebral artery [facility])   Patient Active Problem List    Diagnosis Date Noted    Ambulatory dysfunction     Right sided weakness     UTI (urinary tract infection) 05/10/2022    Hyponatremia 2022    Cerebrovascular accident (CVA) due to stenosis of right posterior cerebral artery ([address] ) 2022    Urinary frequency 2022    Pressure injury of left heel, unstageable [facility] ) 2020    Syncope 10/25/2019    Pressure injury of left buttock, stage 1 10/25/2019    Late onset Alzheimer's disease without behavioral disturbance ([facility] ) 10/25/2019    Need for immunization against influenza 2018    Type 2 diabetes mellitus without complication, with long-term current use of insulin ([facility] ) 2018    Chronic diastolic congestive heart failure ([address] ) 2018    Aortic valve disorder 2018      LOS (days): 10  Geometric Mean LOS (GMLOS) (days): 2 90  Days to GMLOS:-6 6     OBJECTIVE:  Risk of Unplanned Readmission Score: 16 85         Current admission status: Inpatient   Preferred Pharmacy:   [place] [id number]  Phone: [phone] Fax: [phone]    Primary Care Provider: Mariela Tillman MD    Primary Insurance: MEDICARE  Secondary Insurance: [facility]    DISCHARGE DETAILS:    Other Referral/Resources/Interventions Provided:  Interventions: Short Term Rehab  Referral Comments: CM called Cassie Bella from [facility] at [phone]  Cassie Bella reported that she has no yellow beds at this time and is not expected to have any until Monday  CM sent a TT to Mónica from [facility] [place] to determine if she has any beds available at this time   Response pending  There are no other accepting facilities at this time

## 2022-05-12 NOTE — OCCUPATIONAL THERAPY NOTE
Occupational Therapy Treatment Note        Patient Name: Jeannine Maciel's Date: 5/12/2022 05/12/22 0938   OT Last Visit   OT Visit Date 05/12/22   Note Type   Note Type Treatment   Restrictions/Precautions   Weight Bearing Precautions Per Order No   Pain Assessment   Pain Assessment Tool FLACC   Pain Rating: FLACC (Rest) - Face 0   Pain Rating: FLACC (Rest) - Legs 0   Pain Rating: FLACC (Rest) - Activity 0   Pain Rating: FLACC (Rest) - Cry 0   Pain Rating: FLACC (Rest) - Consolability 0   Score: FLACC (Rest) 0   Pain Rating: FLACC (Activity) - Face 1   Pain Rating: FLACC (Activity) - Legs 1   Pain Rating: FLACC (Activity) - Activity 1   Pain Rating: FLACC (Activity) - Cry 0   Pain Rating: FLACC (Activity) - Consolability 0   Score: FLACC (Activity) 3   Bed Mobility   Rolling R 4  Minimal assistance   Additional items Assist x 1;Bedrails   Rolling L 4  Minimal assistance   Additional items Assist x 1;HOB elevated; Increased time required;Verbal cues   Supine to Sit 3  Moderate assistance   Additional items Assist x 1;HOB elevated; Increased time required;Verbal cues   Transfers   Sit to Stand 2  Maximal assistance   Additional items Assist x 2; Increased time required;Verbal cues   Stand to Sit 2  Maximal assistance   Additional items Assist x 2; Increased time required;Armrests; Verbal cues   Stand pivot 2  Maximal assistance   Additional items Assist x 2; Increased time required;Verbal cues   Functional Mobility   Functional Mobility 2  Maximal assistance   Additional Comments assist of 2/ stand pivot towards the right side only  Additional items Rolling walker   Therapeutic Exercise - ROM   UE-ROM Yes   ROM- Right Upper Extremities   R Shoulder AROM; Flexion;ABduction; Extension;Horizontal ABduction   R Elbow AROM;Elbow flexion;Elbow extension   R Hand AROM; Thumb; Index finger; Long finger;Ring finger;Little finger  (open/ close fist)   R Position Seated   R Weight/Reps/Sets 2 sets of 5 repetitions ROM - Left Upper Extremities    L Shoulder AROM; Flexion;ABduction; Extension;Horizontal ABduction   L Elbow AROM;Elbow flexion;Elbow extension   L Hand AAROM; Index finger; Long finger;Ring finger;Little finger; Thumb   L Position Seated   L Weight/Reps/Sets 2 sets of 5 repetitions   Cognition   Overall Cognitive Status Impaired   Arousal/Participation Alert; Responsive; Cooperative   Attention Within functional limits   Orientation Level Oriented to person;Disoriented to place; Disoriented to time;Disoriented to situation   Memory Decreased recall of recent events;Decreased short term memory;Decreased recall of biographical information   Activity Tolerance   Activity Tolerance Patient limited by fatigue   Assessment   Assessment Patient participated in Skilled OT session this date with interventions consisting of  therapeutic activities to: increase activity tolerance, increase cardiovascular endurance , elicit righting and equilibrium reactions for improved postural control and alignment during transitional movements, increase dynamic sit/ stand balance during functional activity , increase postural control and increase OOB/ sitting tolerance   Patient agreeable to OT treatment session, upon arrival patient was found supine in bed, alert, responsive  and in no apparent distress  Patient completed UE therapeutic exercises, sitting tolerance at edge of bed, transfer training bed to [address] [place] [address] with max assist of 2 (stand pivot technique) Patient required assist of 2 skilled therapists to facilitate neuromuscular components of movement, provide weight bearing assistance to increase self performance in transitional movements and achieve functional sitting/ standing activities  Patient requiring verbal cues for safety, verbal cues for pacing thru activity steps and cognitive assistance to anticipate next step   Patient continues to be functioning below baseline level, occupational performance remains limited secondary to factors listed above and increased risk for falls and injury  From OT standpoint, recommendation at time of d/c would be Short Term Rehab  Patient to benefit from continued Occupational Therapy treatment while in the hospital to address deficits as defined above and maximize level of functional   Plan   Treatment Interventions ADL retraining;Functional transfer training;UE strengthening/ROM; Endurance training;Patient/family training;Equipment evaluation/education; Compensatory technique education;Continued evaluation; Energy conservation; Activityengagement   Goal Expiration Date 05/19/22   OT Treatment Day 1   OT Frequency 3-5x/wk   Recommendation   OT Discharge Recommendation Post acute rehabilitation services   AM-PAC Daily Activity Inpatient   Lower Body Dressing 2   Bathing 2   Toileting 2   Upper Body Dressing 3   Grooming 3   Eating 3   Daily Activity Raw Score 15   Daily Activity Standardized Score (Calc for Raw Score >=11) 34 69   AM-PAC Applied Cognition Inpatient   Following a Speech/Presentation 3   Understanding Ordinary Conversation 3   Taking Medications 1   Remembering Where Things Are Placed or Put Away 2   Remembering List of 4-5 Errands 1   Taking Care of Complicated Tasks 2   Applied Cognition Raw Score 12   Applied Cognition Standardized Score 28 82

## 2022-05-12 NOTE — CASE MANAGEMENT
Case Management Discharge Planning Note    Patient name Maegan Eller  Location [address] [id number]/-[id number] MRN [id number]  : 1930 Date 2022       Current Admission Date: 2022  Current Admission Diagnosis:Cerebrovascular accident (CVA) due to stenosis of right posterior cerebral artery [facility])   Patient Active Problem List    Diagnosis Date Noted    Ambulatory dysfunction     Right sided weakness     UTI (urinary tract infection) 05/10/2022    Hyponatremia 2022    Cerebrovascular accident (CVA) due to stenosis of right posterior cerebral artery ([facility] [address] ) 2022    Urinary frequency 2022    [facility] left [facility] un[facility]able [facility] 2020    Syncope 10/25/2019    Pressure injury of left buttock, stage 1 10/25/2019    Late onset Alzheimer's disease without behavioral disturbance ([facility] ) 10/25/2019    Need for immunization against influenza 2018    Type 2 diabetes mellitus without complication, with long-term current use of insulin ([facility] ) 2018    Chronic diastolic congestive heart failure ([facility] ) 2018    Aortic valve disorder 2018      LOS (days): 10  Geometric Mean LOS (GMLOS) (days): 2 90  Days to GMLOS:-6 7     OBJECTIVE:  Risk of Unplanned Readmission Score: 16 9         Current admission status: Inpatient   Preferred Pharmacy:   [facility] # [address] [place] [id number]  Phone: [phone] Fax: [phone]    Primary Care Provider: Velna Kussmaul, MD    Primary Insurance: MEDICARE  Secondary Insurance: PHCS    DISCHARGE DETAILS:    Discharge planning discussed with[de-identified] Patient's Son  Freedom of Choice: Yes  Comments - Freedom of Choice: CM called patient's son to report transportation time  Son denied any additional questions or concerns at this time    CM contacted family/caregiver?: Yes  Were Treatment Team discharge recommendations reviewed with patient/caregiver?: Yes  Did patient/caregiver verbalize understanding of patient care needs?: Yes  Were patient/caregiver advised of the risks associated with not following Treatment Team discharge recommendations?: Yes    Contacts  Patient Contacts: [place]  Relationship to Patient[de-identified] Family  Contact Method: Phone  Phone Number: [phone]  Reason/Outcome: Continuity of Care, Discharge Planning    Other Referral/Resources/Interventions Provided:  Interventions: Transportation  Referral Comments: CM informed RN, ALLEY, and Mónica from [facility] of transportation time via TT   Patient will be transported by [facility] at 5:30 PM

## 2022-05-12 NOTE — PROGRESS NOTES
[facility]  Progress Note - Clementine Balbuena 1930, 80 y o  female MRN: [id number]  Unit/Bed#: -01 Encounter: [id number]    VTE Pharmacologic Prophylaxis: VTE Score: 8 Moderate Risk (Score 3-4) - Pharmacological DVT Prophylaxis Ordered: enoxaparin (Lovenox)  Patient Centered Rounds: I performed bedside rounds with nursing staff today  Discussions with Specialists or Other Care Team Provider: cm    Education and Discussions with Family / Patient: Updated  (son) via phone  Time Spent for Care: 30 minutes  More than 50% of total time spent on counseling and coordination of care as described above  Current Length of Stay: 10 day(s)  Current Patient Status: Inpatient   Certification Statement: The patient will continue to require additional inpatient hospital stay due to placement  Discharge Plan: Anticipate discharge tomorrow to rehab facility  Code Status: Level 3 - DNAR and DNI    Subjective:   No complaint    Objective:     Vitals:   Temp (24hrs), Av 3 °F (36 3 °C), Min:96 1 °F (35 6 °C), Max:98 9 °F (37 2 °C)    Temp:  [96 1 °F (35 6 °C)-98 9 °F (37 2 °C)] 97 8 °F (36 6 °C)  HR:  [47-96] 96  Resp:  [17-18] 18  BP: (132-142)/(59-80) 132/59  SpO2:  [91 %-98 %] 97 %  Body mass index is 24 61 kg/m²  Input and Output Summary (last 24 hours):      Intake/Output Summary (Last 24 hours) at 2022 1127  Last data filed at 2022 2329  Gross per 24 hour   Intake 300 ml   Output 750 ml   Net -450 ml       Physical Exam:     GEN: No acute distress, comfortable, elderly, chronically ill  HEEENT: No JVD, PERRLA, no scleral icterus  RESP: Lungs clear to auscultation bilaterally  CV: RRR, +s1/s2   ABD: SOFT NON TENDER, POSITIVE BOWEL SOUNDS, NO DISTENTION  PSYCH: apparent dementia  NEURO:  NO FOCAL DEFICITS  SKIN: NO RASH  EXTREM: NO EDEMA     Additional Data:     Labs:  Results from last 7 days   Lab Units 22  0601   WBC Thousand/uL 9 68   HEMOGLOBIN g/dL 12 0   HEMATOCRIT % 37 8   PLATELETS Thousands/uL 193   NEUTROS PCT % 71   LYMPHS PCT % 16   MONOS PCT % 11   EOS PCT % 2     Results from last 7 days   Lab Units 05/12/22  0601   SODIUM mmol/L 135*   POTASSIUM mmol/L 3 8   CHLORIDE mmol/L 101   CO2 mmol/L 26   BUN mg/dL 20   CREATININE mg/dL 0 96   ANION GAP mmol/L 8   CALCIUM mg/dL 8 6   ALBUMIN g/dL 2 6*   TOTAL BILIRUBIN mg/dL 0 46   ALK PHOS U/L 113   ALT U/L 37   AST U/L 34   GLUCOSE RANDOM mg/dL 179*         Results from last 7 days   Lab Units 05/12/22  1101 05/12/22  0730 05/11/22  2025 05/11/22  1610 05/11/22  1059 05/11/22  0729 05/10/22  2033 05/10/22  1629 05/10/22  1056 05/10/22  0714 05/10/22  0314 05/10/22  0135   POC GLUCOSE mg/dl 486* 207* 159* 176* 210* 199* 179* 109 240* 169* 140 81         Results from last 7 days   Lab Units 05/12/22  0621   LACTIC ACID mmol/L 0 7       Lines/Drains:  Invasive Devices  Report    Peripheral Intravenous Line  Duration           Peripheral IV 05/10/22 Left;Dorsal (posterior) Forearm 2 days          Drain  Duration           Urethral Catheter 16 Fr  <1 day              Urinary Catheter:  Goal for removal: Voiding trial when ambulation improves               Imaging: No pertinent imaging reviewed  Recent Cultures (last 7 days):   Results from last 7 days   Lab Units 05/10/22  2114   URINE CULTURE  Culture results to follow         Last 24 Hours Medication List:   Current Facility-Administered Medications   Medication Dose Route Frequency Provider Last Rate    aspirin  81 mg Oral Daily [place], [place]      atorvastatin  40 mg Oral QPM [place], [place]      cefTRIAXone  1,000 mg Intravenous Q24H Destini Daniels MD 1,000 mg (05/11/22 1833)    clopidogrel  75 mg Oral Daily BRIAN Rosen      enoxaparin  30 mg Subcutaneous Daily Makenzie Cohen MD      insulin aspart protamine-insulin aspart  10 Units Subcutaneous QPM Makenzie Cohen MD      insulin aspart protamine-insulin aspart  15 Units Subcutaneous QAM Makenzie Song MD      insulin lispro  1-5 Units Subcutaneous TID AC Kami Lirianoron [place], [place]      insulin lispro  1-5 Units Subcutaneous HS Kami Gama [place], PA-C      OLANZapine  5 mg Oral HS PRN Kendra Zacarias MD          Today, Patient Was Seen By: Jayashree Brown MD    **Please Note: This note may have been constructed using a voice recognition system  **Primary Care Provider: Harleen Bucio MD   Date and time admitted to hospital: 5/2/2022  8:03 PM    * Cerebrovascular accident (CVA) due to stenosis of right posterior cerebral artery ([facility] [address] )  Assessment & Plan  · Left sided weakness   · CTA without evidence for acute infarct, bleed, or mass effect, however is showing occlusion vs severe stenosis of the right posterior cerebral artery and 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  · CTA of the head and neck showed severe stenosis/occlusion of the right PCA, with 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, her labs revealed significant hyperlipidemia, and on examination patient had subtle left-sided weakness with neglect and field cut to suspect a right PCA infarct  ·   Patient admitted on stroke pathway  · MRI of the brain done subsequently confirms the presence of a right PCA infarct of recent duration  · DAPT for 21 dys after which ASA alone will be continued  · Continue statin therapy, ASA and atorvastatin  · Control blood pressure/ blood sugar   · PT/OT recommended inpatient rehab      · Neuro signed off and outpatient F/U recommended   · Possible DC once placed        Urinary frequency  Assessment & Plan  · Positive  urinary frequency since Friday without fever  · UA (+) and now on Rocephin   · Monitor while awaiting urine cx result         UTI (urinary tract infection)  Assessment & Plan  UA (+)  Urine cx pending   iv Rocephin   Patient has urinary frequency since admission Hyponatremia  Assessment & Plan  Resolved    Late onset Alzheimer's disease without behavioral disturbance (HCC)  Assessment & Plan  · Had episodes of confusion, not able to answer questions properly but compared to admission the agitation has significantly decreased  · Answers simple questions  · Back to mental baseline per son   · No restraint in the past 48 hours and not agitated  · Case management  On board for placement  · No need of any restraints or one-to-one observation at this time  · Not fully vaccinated against covid and has received one dose while in house  · Will dc once placed      Chronic diastolic congestive heart failure ([facility] [address] )  Assessment & Plan  Wt Readings from Last 3 Encounters:   05/03/22 57 2 kg (126 lb)   11/21/20 57 6 kg (127 lb)   05/31/20 61 9 kg (136 lb 7 4 oz)       · Echocardiogram done on this admission:  Ejection fraction is 60%  Wall motion is normal   Moderate aortic valve stenosis  Mild mitral valve regurgitation  Mild tricuspid valve regurgitation  · Appears euvolemic on exam      Type 2 diabetes mellitus without complication, with long-term current use of insulin ([facility])  Assessment & Plan  Lab Results   Component Value Date    HGBA1C 8 2 (H) 05/03/2022     · Presently, blood sugars are at acceptable levels  · Continue NovoLog 70/30 15 units in the morning and 15 units in the evening  · Correctional SSI  · Hypoglycemia protocol; diabetic diet ordered  · Adjust treatment accordingly

## 2022-05-12 NOTE — ASSESSMENT & PLAN NOTE
Lab Results   Component Value Date    HGBA1C 8 2 (H) 05/03/2022     · Presently, blood sugars are at acceptable levels  · Continue NovoLog 70/30 15 units in the morning and 15 units in the evening  · Correctional SSI  · Hypoglycemia protocol; diabetic diet ordered  · Adjust treatment accordingly

## 2022-05-12 NOTE — PHYSICAL THERAPY NOTE
Physical Therapy Treatment Note    Patient Name: Bette Mccauley    Diagnosis: Weakness [R53 1]  Right sided weakness [R53 1]  Stroke-like symptoms [R29 90]  Ambulatory dysfunction [R26 2]     05/12/22 1002   PT Last Visit   PT Visit Date 05/12/22   Note Type   Note Type Treatment   Pain Assessment   Pain Assessment Tool FLACC   Pain Rating: FLACC (Rest) - Face 0   Pain Rating: FLACC (Rest) - Legs 0   Pain Rating: FLACC (Rest) - Activity 0   Pain Rating: FLACC (Rest) - Cry 0   Pain Rating: FLACC (Rest) - Consolability 0   Score: FLACC (Rest) 0   Pain Rating: FLACC (Activity) - Face 1   Pain Rating: FLACC (Activity) - Legs 1   Pain Rating: FLACC (Activity) - Activity 1   Pain Rating: FLACC (Activity) - Cry 0   Pain Rating: FLACC (Activity) - Consolability 0   Score: FLACC (Activity) 3   Restrictions/Precautions   Weight Bearing Precautions Per Order No   Other Precautions Cognitive; Chair Alarm; Bed Alarm; Fall Risk;Pain   General   Chart Reviewed Yes   Response to Previous Treatment Patient unable to report, no changes reported from family or staff   Family/Caregiver Present No   Cognition   Overall Cognitive Status Impaired   Arousal/Participation Alert; Responsive; Cooperative   Attention Within functional limits   Orientation Level Oriented to person;Disoriented to place; Disoriented to time;Disoriented to situation   Memory Decreased recall of recent events;Decreased short term memory   Following Commands Follows one step commands with increased time or repetition   Comments Pt agreeable to PT  Subjective   Subjective "I'll get up "   Bed Mobility   Rolling R 4  Minimal assistance   Additional items Assist x 1;Bedrails; Increased time required;Verbal cues;LE management   Rolling L 4  Minimal assistance   Additional items Assist x 1;Bedrails; Increased time required;Verbal cues;LE management   Supine to Sit 3  Moderate assistance   Additional items Assist x 1;HOB elevated; Bedrails; Increased time required;Verbal cues;LE management   Transfers   Sit to Stand 2  Maximal assistance   Additional items Assist x 2; Increased time required;Verbal cues   Stand to Sit 2  Maximal assistance   Additional items Assist x 2; Increased time required;Verbal cues;Armrests   Stand pivot 2  Maximal assistance   Additional items Assist x 2;Armrests; Increased time required;Verbal cues   Additional Comments Pt with increased left lateral trunk lean requires verbal cues and assist for upright posture   Ambulation/Elevation   Gait pattern Not tested   Ambulation/Elevation Additional Comments Pt unable to shift weight appropriately at this time to initate gait   Balance   Static Sitting Fair -   Dynamic Sitting Poor +   Static Standing Poor   Dynamic Standing Poor -   Endurance Deficit   Endurance Deficit Yes   Endurance Deficit Description decreased activity tolerance   Activity Tolerance   Activity Tolerance Patient limited by fatigue   Medical Staff Made Aware OT [place] Drain   Nurse Made Aware Discussed case with RN Sheila Anthony   Exercises   Heelslides Supine;10 reps;AAROM; Bilateral   Hip Abduction Sitting;5 reps;AAROM; Bilateral  (long sitting)   Ankle Pumps Supine;5 reps;AROM; Bilateral   Balance training  sit to stand transfers x 2 trials   Assessment   Prognosis Fair   Problem List Decreased strength;Decreased endurance; Impaired balance;Decreased mobility; Decreased cognition;Pain   Assessment Chart reviewed  Patient was received supine in bed in NAD and agreeable to PT session  Today's PT treatment session consisted of therapeutic activity for facilitation of transitional movements and safe performance of correct technique for bed mobility, sit to stand transfers, and stand pivot transfers and therapeutic exercise to increase lower extremity muscle strength  In comparison to the previous session the patient has not made progress as evident by requiring increased assistance with sit to stand transfers   Pt was able to perform a stand pivot transfer with assist of two  Pt continues to exhibit a left lateral trunk lean in standing and requires cues and assist for an upright posture  Pt tolerated therapeutic exercise well  Overall, patient tolerated today's session well  Patient's prognosis for achieving their STG's is fair  Pt's STG's were updated this session  Co-treatment was performed with OT secondary to complex medical condition of pt  Pt requires assist of two to achieve and maintain transitional movements; requiring the need of skilled therapeutic intervention of two therapists to achieve the delivery of services  PT/OT goals were addressed separately  PT intervention continues to be appropriate as the patient continues to be limited by pain, decreased lower extremity strength, impaired balance, decreased endurance, and decreased functional mobility  Continue to recommend STR  PT to continue to see patient in order to address the deficits listed above and provide interventions consistent with the POC in order to achieve STG's and optimize the patient's independence with functional mobility  Barriers to Discharge Inaccessible home environment;Decreased caregiver support   Goals   STG Expiration Date 05/22/22   Short Term Goal #1 In 7-10 days: Increase bilateral LE strength 1/2 grade to facilitate independent mobility, Perform all bed mobility tasks with min A of 1 to decrease caregiver burden, Perform all transfers with min A of 1 to improve independence, Increase all balance 1/2 grade to decrease risk for falls and PT to see and establish goals for gait when appropriate   Plan   Treatment/Interventions Functional transfer training;LE strengthening/ROM; Therapeutic exercise; Endurance training;Cognitive reorientation;Patient/family training;Bed mobility;Continued evaluation;Spoke to nursing;OT   Progress No functional improvements   PT Frequency 3-5x/wk   Recommendation   PT Discharge Recommendation Post acute rehabilitation services   AM-PAC Basic Mobility Inpatient Turning in Bed Without Bedrails 3   Lying on Back to Sitting on Edge of Flat Bed 2   Moving Bed to Chair 1   Standing Up From Chair 1   Walk in Room 1   Climb 3-5 Stairs 1   Basic Mobility Inpatient Raw Score 9   Turning Head Towards Sound 4   Follow Simple Instructions 3   Low Function Basic Mobility Raw Score 16   Low Function Basic Mobility Standardized Score 25 72   Highest Level Of Mobility   -[facility] Goal 3: Sit at edge of bed   JH-M Highest Level of Mobility 4: Move to chair/commode   -[facility] Goal Achieved Yes     Gege Burnette, PT, DPT    Time of PT treatment session: [id number]  24 minutes

## 2022-05-13 ENCOUNTER — TRANSITIONAL CARE MANAGEMENT (OUTPATIENT)
Dept: INTERNAL MEDICINE CLINIC | Facility: CLINIC | Age: 87
End: 2022-05-13

## 2022-05-13 ENCOUNTER — NURSING HOME VISIT (OUTPATIENT)
Dept: GERIATRICS | Facility: OTHER | Age: 87
End: 2022-05-13
Payer: MEDICARE

## 2022-05-13 VITALS
OXYGEN SATURATION: 98 % | DIASTOLIC BLOOD PRESSURE: 64 MMHG | HEART RATE: 61 BPM | WEIGHT: 127.9 LBS | SYSTOLIC BLOOD PRESSURE: 124 MMHG | BODY MASS INDEX: 24.98 KG/M2 | TEMPERATURE: 98.2 F | RESPIRATION RATE: 16 BRPM

## 2022-05-13 DIAGNOSIS — I63.531 CEREBROVASCULAR ACCIDENT (CVA) DUE TO STENOSIS OF RIGHT POSTERIOR CEREBRAL ARTERY (HCC): Primary | ICD-10-CM

## 2022-05-13 PROCEDURE — 99306 1ST NF CARE HIGH MDM 50: CPT | Performed by: INTERNAL MEDICINE

## 2022-05-13 RX ORDER — CEFPODOXIME PROXETIL 100 MG/1
100 TABLET, FILM COATED ORAL 2 TIMES DAILY
COMMUNITY
End: 2022-05-23 | Stop reason: ALTCHOICE

## 2022-05-13 NOTE — PLAN OF CARE
Problem: MOBILITY - ADULT  Goal: Maintain or return to baseline ADL function  Description: INTERVENTIONS:  -  Assess patient's ability to carry out ADLs; assess patient's baseline for ADL function and identify physical deficits which impact ability to perform ADLs (bathing, care of mouth/teeth, toileting, grooming, dressing, etc )  - Assess/evaluate cause of self-care deficits   - Assess range of motion  - Assess patient's mobility; develop plan if impaired  - Assess patient's need for assistive devices and provide as appropriate  - Encourage maximum independence but intervene and supervise when necessary  - Involve family in performance of ADLs  - Assess for home care needs following discharge   - Consider OT consult to assist with ADL evaluation and planning for discharge  - Provide patient education as appropriate  Outcome: Adequate for Discharge  Goal: Maintains/Returns to pre admission functional level  Description: INTERVENTIONS:  - Perform BMAT or MOVE assessment daily    - Set and communicate daily mobility goal to care team and patient/family/caregiver  - Collaborate with rehabilitation services on mobility goals if consulted  - Perform Range of Motion 2 times a day  - Reposition patient every 2 hours    - Dangle patient 2 times a day  - Stand patient 2 times a day  - Ambulate patient 2 times a day  - Out of bed to chair 2 times a day   - Out of bed for meals 2 times a day  - Out of bed for toileting  - Record patient progress and toleration of activity level   Outcome: Adequate for Discharge     Problem: Potential for Falls  Goal: Patient will remain free of falls  Description: INTERVENTIONS:  - Educate patient/family on patient safety including physical limitations  - Instruct patient to call for assistance with activity   - Consult OT/PT to assist with strengthening/mobility   - Keep Call bell within reach  - Keep bed low and locked with side rails adjusted as appropriate  - Keep care items and personal belongings within reach  - Initiate and maintain comfort rounds  - Make Fall Risk Sign visible to staff  - Offer Toileting every 2 Hours, in advance of need  - Initiate/Maintain 2alarm  - Obtain necessary fall risk management equipment: 2  - Apply yellow socks and bracelet for high fall risk patients  - Consider moving patient to room near nurses station  Outcome: Adequate for Discharge     Problem: Prexisting or High Potential for Compromised Skin Integrity  Goal: Skin integrity is maintained or improved  Description: INTERVENTIONS:  - Identify patients at risk for skin breakdown  - Assess and monitor skin integrity  - Assess and monitor nutrition and hydration status  - Monitor labs   - Assess for incontinence   - Turn and reposition patient  - Assist with mobility/ambulation  - Relieve pressure over bony prominences  - Avoid friction and shearing  - Provide appropriate hygiene as needed including keeping skin clean and dry  - Evaluate need for skin moisturizer/barrier cream  - Collaborate with interdisciplinary team   - Patient/family teaching  - Consider wound care consult   Outcome: Adequate for Discharge     Problem: Nutrition/Hydration-ADULT  Goal: Nutrient/Hydration intake appropriate for improving, restoring or maintaining nutritional needs  Description: Monitor and assess patient's nutrition/hydration status for malnutrition  Collaborate with interdisciplinary team and initiate plan and interventions as ordered  Monitor patient's weight and dietary intake as ordered or per policy  Utilize nutrition screening tool and intervene as necessary  Determine patient's food preferences and provide high-protein, high-caloric foods as appropriate       INTERVENTIONS:  - Monitor oral intake, urinary output, labs, and treatment plans  - Assess nutrition and hydration status and recommend course of action  - Evaluate amount of meals eaten  - Assist patient with eating if necessary   - Allow adequate time for meals  - Recommend/ encourage appropriate diets, oral nutritional supplements, and vitamin/mineral supplements  - Order, calculate, and assess calorie counts as needed  - Assess need for intravenous fluids  - Provide nutrition/hydration education as appropriate  - Include patient/family/caregiver in decisions related to nutrition  Outcome: Adequate for Discharge     Problem: METABOLIC, FLUID AND ELECTROLYTES - ADULT  Goal: Electrolytes maintained within normal limits  Description: INTERVENTIONS:  - Monitor labs and assess patient for signs and symptoms of electrolyte imbalances  - Administer electrolyte replacement as ordered  - Monitor response to electrolyte replacements, including repeat lab results as appropriate  - Instruct patient on fluid and nutrition as appropriate  Outcome: Adequate for Discharge  Goal: Fluid balance maintained  Description: INTERVENTIONS:  - Monitor labs   - Monitor I/O and WT  - Instruct patient on fluid and nutrition as appropriate  - Assess for signs & symptoms of volume excess or deficit  Outcome: Adequate for Discharge  Goal: Glucose maintained within target range  Description: INTERVENTIONS:  - Monitor Blood Glucose as ordered  - Assess for signs and symptoms of hyperglycemia and hypoglycemia  - Administer ordered medications to maintain glucose within target range  - Assess nutritional intake and initiate nutrition service referral as needed  Outcome: Adequate for Discharge     Problem: SKIN/TISSUE INTEGRITY - ADULT  Goal: Skin Integrity remains intact(Skin Breakdown Prevention)  Description: Assess:  -Perform Jorge Luis assessment every 2  -Clean and moisturize skin every 2  -Inspect skin when repositioning, toileting, and assisting with ADLS  -Assess under medical devices such as 2 every 2  -Assess extremities for adequate circulation and sensation     Bed Management:  -Have minimal linens on bed & keep smooth, unwrinkled  -Change linens as needed when moist or perspiring  -Avoid sitting or lying in one position for more than 2 hours while in bed  -Keep HOB at 2degrees     Toileting:  -Offer bedside commode  -Assess for incontinence every 2  -Use incontinent care products after each incontinent episode such as 2    Activity:  -Mobilize patient 2 times a day  -Encourage activity and walks on unit  -Encourage or provide ROM exercises   -Turn and reposition patient every 2 Hours  -Use appropriate equipment to lift or move patient in bed  -Instruct/ Assist with weight shifting every 2 when out of bed in chair  -Consider limitation of chair time 2 hour intervals    Skin Care:  -Avoid use of baby powder, tape, friction and shearing, hot water or constrictive clothing  -Relieve pressure over bony prominences using 2  -Do not massage red bony areas    Next Steps:  -Teach patient strategies to minimize risks such as 2   -Consider consults to  interdisciplinary teams such as 2  Outcome: Adequate for Discharge  Goal: Incision(s), wounds(s) or drain site(s) healing without S/S of infection  Description: INTERVENTIONS  - Assess and document dressing, incision, wound bed, drain sites and surrounding tissue  - Provide patient and family education  - Perform skin care/dressing changes every 2  Outcome: Adequate for Discharge  Goal: Pressure injury heals and does not worsen  Description: Interventions:  - Implement low air loss mattress or specialty surface (Criteria met)  - Apply silicone foam dressing  - Instruct/assist with weight shifting every 2 minutes when in chair   - Limit chair time to 2 hour intervals  - Use special pressure reducing interventions such as 2 when in chair   - Apply fecal or urinary incontinence containment device   - Perform passive or active ROM every 2  - Turn and reposition patient & offload bony prominences every 2 hours   - Utilize friction reducing device or surface for transfers   - Consider consults to  interdisciplinary teams such as 2  - Use incontinent care products after each incontinent episode such as 2  - Consider nutrition services referral as needed  Outcome: Adequate for Discharge     Problem: MUSCULOSKELETAL - ADULT  Goal: Maintain or return mobility to safest level of function  Description: INTERVENTIONS:  - Assess patient's ability to carry out ADLs; assess patient's baseline for ADL function and identify physical deficits which impact ability to perform ADLs (bathing, care of mouth/teeth, toileting, grooming, dressing, etc )  - Assess/evaluate cause of self-care deficits   - Assess range of motion  - Assess patient's mobility  - Assess patient's need for assistive devices and provide as appropriate  - Encourage maximum independence but intervene and supervise when necessary  - Involve family in performance of ADLs  - Assess for home care needs following discharge   - Consider OT consult to assist with ADL evaluation and planning for discharge  - Provide patient education as appropriate  Outcome: Adequate for Discharge  Goal: Maintain proper alignment of affected body part  Description: INTERVENTIONS:  - Support, maintain and protect limb and body alignment  - Provide patient/ family with appropriate education  Outcome: Adequate for Discharge

## 2022-05-13 NOTE — PROGRESS NOTES
[facility]  [address] [place] [address], [address] [place] [address]  [id number]    Nursing Home Admission    NAME: Misty Rios  AGE: 80 y o  SEX: female [id number]      Patient Location     [address] rehab    Patients care was coordinated with nursing facility staff  [facility], labs [facility]  Past Medical, surgical, social, medication and allergy history and patients previous records reviewed  Assessment/Plan:    Cerebrovascular accident (CVA) due to stenosis of right posterior cerebral artery [facility])  Patient was recently hospitalized with left-sided weakness and ambulatory dysfunction  CTA did not reveal any acute infarct bleed or mass effect however showed occlusion versus severe stenosis of right posterior cerebral artery 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  MRI confirmed right PCA infarct of recent duration  Routine labs revealed significant hyperlipidemia  Patient was treated per stroke pathway  She was started on aspirin statin and Plavix  Dual antiplatelet therapy to be continued for 21 days followed by aspirin  Continue risk factor control including control of lipids son diabetes  Patient remains on aspirin statin and atorvastatin    UTI:  Patient complaint of urinary frequency during hospitalization  UA was positive for pyuria  She was started on ceftriaxone for UTI  Recommendations were to continue antibiotic for 7 days  Urine cultures are pending  Will change to cefpodoxime for now  Follow-up urine culture  Late onset Alzheimer's disease without behavioral disturbances:  Patient had few episodes of confusion in the hospital   She was discharged on p r n  olanzapine  Behaviors appear to be stable at present  Will discontinue p r n  antipsychotics  Chronic diastolic CHF:  Last reported EF was 60 percent    Patient is known to have moderate to severe aortic stenosis and mild mitral valve regurgitation  Currently euvolemic, not on any maintenance diuretics  Will continue to monitor    Diabetes mellitus type 2 with long-term use of insulin:  Hemoglobin A1c was slightly elevated though acceptable for age at 8 2  Continue insulin lispro 75/25, 15 units in the morning and 10 units in the afternoon    Goal of care discussion:  D  W the son  Son wishes pt to be kept comfortable only  Requesting DNR and DNH  May treat UTI and do lab work if needed  Chief Complaint      Recent hospitalization for acute CVA/left-sided weakness    HPI       Patient is a 80 y o  female with past medical history significant for diabetes mellitus type 2, CHF and Alzheimer's disease  Patient was recently hospitalized with left-sided weakness and ambulatory dysfunction  Patient was admitted under stroke pathway  CTA head was negative for acute vascular territorial infarction intracranial hemorrhage or mass effect  Patient was found to have occlusion versus severe stenosis of right posterior cerebral artery distal to P1 segment of uncertain chronicity  Multifocal moderate to severe stenosis in the carotid siphons was also reported  MRI brain revealed recent infarct in the right posterior cerebral artery, distribution including medial temporal lobe, hippocampus, occipital lobe in right central semiovale  Patient was also noted to have chronic lacunar infarct bilateral cerebellum  Patient was started on aspirin, Plavix and statin  Hospital course was also significant for UTI and mild infectious encephalopathy suspected to be from UTI  She was treated with 3 day course of IV ceftriaxone  Antibiotics were recommended for 7 days  Urine cultures are pending  Patient was additionally found to have mild hyponatremia during hospitalization with sodium level of 134      Pt was seen by PT OT services and subsequently discharged to  [facility] rehab where she is being seen for post hospital admission  At the time of my evaluation patient is doing okay       Past Medical History:   Diagnosis Date    Aortic valve disorder     CHF (congestive heart failure) (HCC)     Syncope        Past Surgical History:   Procedure Laterality Date    BLADDER SURGERY      CATARACT EXTRACTION      MASTECTOMY Right     last assessed 7/29/14    TOTAL KNEE ARTHROPLASTY         Social History     Tobacco Use   Smoking Status Never Smoker   Smokeless Tobacco Never Used   Tobacco Comment    tobacco non user       Family history:   Non contributory    Allergies   Allergen Reactions    Nitrofurantoin           Current Outpatient Medications:     cefpodoxime (VANTIN) 100 mg tablet, Take 100 mg by mouth in the morning and 100 mg in the evening  X 4 days  , Disp: , Rfl:     aspirin 81 mg chewable tablet, Chew 1 tablet (81 mg total) daily, Disp: , Rfl: 0    atorvastatin (LIPITOR) 40 mg tablet, Take 1 tablet (40 mg total) by mouth every evening, Disp: , Rfl: 0    BD Insulin Syringe U/F 31G X 5/16" 0 5 ML MISC, inject under the skin twice daily, Disp: 200 each, Rfl: 3    clopidogrel (PLAVIX) 75 mg tablet, Take 1 tablet (75 mg total) by mouth daily, Disp: , Rfl: 0    insulin aspart protamine-insulin aspart (NovoLOG 70/30) 100 units/mL injection, Inject 10 Units under the skin every evening, Disp: 1 mL, Rfl: 0    insulin aspart protamine-insulin aspart (NovoLOG 70/30) 100 units/mL injection, Inject 15 Units under the skin every morning, Disp: 1 mL, Rfl: 0    insulin lispro (HumaLOG) 100 units/mL injection, Inject 1-5 Units under the skin in the morning and 1-5 Units at noon and 1-5 Units in the evening  Inject before meals  , Disp: 3 mL, Rfl: 0    Insulin Syringe-Needle U-100 (B-D INS SYRINGE 0 5CC/31GX5/16) 31G X 5/16" 0 5 ML MISC, by Does not apply route, Disp: , Rfl:     Lancets (FREESTYLE) lancets, by Does not apply route, Disp: , Rfl:   No current facility-administered medications for this visit      Updated list was reviewed in [address] [place] [address] system of facility  Vitals:    05/13/22 0949   BP: 124/64   Pulse: 61   Resp: 16   Temp: 98 2 °F (36 8 °C)   SpO2: 98%       Vital signs were reviewed in point click care    Review of Systems   Constitutional: Positive for fatigue  Negative for chills and fever  HENT: Negative for nosebleeds and rhinorrhea  Eyes: Negative for discharge and redness  Respiratory: Negative for cough, shortness of breath, wheezing and stridor  Cardiovascular: Negative for chest pain and leg swelling  Gastrointestinal: Negative for abdominal distention, abdominal pain, diarrhea and vomiting  Genitourinary: Negative for hematuria  Musculoskeletal: Positive for gait problem  Negative for arthralgias and back pain  Skin: Negative for pallor  Neurological: Positive for weakness (Generalized)  Negative for tremors, seizures and syncope  Psychiatric/Behavioral: Positive for confusion  Negative for agitation and behavioral problems  Physical Exam  Constitutional:       Appearance: She is ill-appearing (lethargic but arousable)  HENT:      Head: Normocephalic and atraumatic  Nose: No rhinorrhea  Eyes:      General: No scleral icterus  Right eye: No discharge  Left eye: No discharge  Cardiovascular:      Rate and Rhythm: Normal rate and regular rhythm  Pulmonary:      Breath sounds: No wheezing, rhonchi or rales  Abdominal:      General: There is no distension  Palpations: Abdomen is soft  Tenderness: There is no abdominal tenderness  There is no guarding  Genitourinary:     Comments: Weiss catheter in place  Musculoskeletal:      Cervical back: Neck supple  Right lower leg: No edema  Left lower leg: No edema  Comments: IV line in right forearm   Skin:     Coloration: Skin is not jaundiced  Findings: Erythema (small erythematous patch with some exocriation and scab over anterior aspect of left LE) present  Comments: Scars marks involving anterior aspect of b/l knees from previous surgery   Neurological:      Mental Status: She is disoriented  Cranial Nerves: No cranial nerve deficit  Motor: Weakness (left sided more pronounced in LLE) present  Psychiatric:         Mood and Affect: Mood normal            Diagnostic Data       Recent labs and imaging studies were reviewed  Lab Results   Component Value Date    WBC 9 68 05/12/2022    HGB 12 0 05/12/2022    HCT 37 8 05/12/2022    MCV 89 05/12/2022     05/12/2022     Lab Results   Component Value Date    SODIUM 135 (L) 05/12/2022    K 3 8 05/12/2022     05/12/2022    CO2 26 05/12/2022    BUN 20 05/12/2022    CREATININE 0 96 05/12/2022    GLUC 179 (H) 05/12/2022    CALCIUM 8 6 05/12/2022        Code Status:      DNR, DNI    Additional notes:      Discontinue IV ceftriaxone  Start cefpodoxime 100 milligram b i d  Discontinue p r n  olanzapine  Continue PT OT   Follow-up repeat labs  Pt has a enriuqez cathter, will attempt voiding trial in a few days  D W the son  Pt did not have a catheter prior to admission    PT is a DNR, DNH per son      This note was electronically signed by Dr Shant Blair

## 2022-05-13 NOTE — ASSESSMENT & PLAN NOTE
Patient was recently hospitalized with left-sided weakness and ambulatory dysfunction  CTA did not reveal any acute infarct bleed or mass effect however showed occlusion versus severe stenosis of right posterior cerebral artery 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  MRI confirmed right PCA infarct of recent duration  Routine labs revealed significant hyperlipidemia  Patient was treated per stroke pathway  She was started on aspirin statin and Plavix  Dual antiplatelet therapy to be continued for 21 days followed by aspirin  Continue risk factor control including control of lipids son diabetes    Patient remains on aspirin statin and atorvastatin negative

## 2022-05-14 LAB
BACTERIA UR CULT: ABNORMAL

## 2022-05-16 ENCOUNTER — NURSING HOME VISIT (OUTPATIENT)
Dept: GERIATRICS | Facility: OTHER | Age: 87
End: 2022-05-16
Payer: MEDICARE

## 2022-05-16 VITALS
DIASTOLIC BLOOD PRESSURE: 95 MMHG | RESPIRATION RATE: 18 BRPM | BODY MASS INDEX: 24.98 KG/M2 | HEART RATE: 73 BPM | TEMPERATURE: 97.5 F | OXYGEN SATURATION: 94 % | SYSTOLIC BLOOD PRESSURE: 121 MMHG | WEIGHT: 127.9 LBS

## 2022-05-16 DIAGNOSIS — Z79.4 TYPE 2 DIABETES MELLITUS WITHOUT COMPLICATION, WITH LONG-TERM CURRENT USE OF INSULIN (HCC): ICD-10-CM

## 2022-05-16 DIAGNOSIS — F02.80 LATE ONSET ALZHEIMER'S DISEASE WITHOUT BEHAVIORAL DISTURBANCE (HCC): ICD-10-CM

## 2022-05-16 DIAGNOSIS — G30.1 LATE ONSET ALZHEIMER'S DISEASE WITHOUT BEHAVIORAL DISTURBANCE (HCC): ICD-10-CM

## 2022-05-16 DIAGNOSIS — E11.9 TYPE 2 DIABETES MELLITUS WITHOUT COMPLICATION, WITH LONG-TERM CURRENT USE OF INSULIN (HCC): ICD-10-CM

## 2022-05-16 DIAGNOSIS — I50.32 CHRONIC DIASTOLIC CONGESTIVE HEART FAILURE (HCC): ICD-10-CM

## 2022-05-16 DIAGNOSIS — N39.0 URINARY TRACT INFECTION WITHOUT HEMATURIA, SITE UNSPECIFIED: ICD-10-CM

## 2022-05-16 DIAGNOSIS — I63.531 CEREBROVASCULAR ACCIDENT (CVA) DUE TO STENOSIS OF RIGHT POSTERIOR CEREBRAL ARTERY (HCC): Primary | ICD-10-CM

## 2022-05-16 PROCEDURE — 99309 SBSQ NF CARE MODERATE MDM 30: CPT | Performed by: NURSE PRACTITIONER

## 2022-05-16 NOTE — ASSESSMENT & PLAN NOTE
Wt Readings from Last 3 Encounters:   05/16/22 58 kg (127 lb 14 4 oz)   05/13/22 58 kg (127 lb 14 4 oz)   05/03/22 57 2 kg (126 lb)     · Recent Echo done inpatient May 2022 - EF60%   wall motion normal, Moderate aortic valve stenoisi, mild mitral valve regurgitation, mild tricuspid valve regurgitation  · Not currently on diuretics were beta-blocker  · BP/heart rate stable  · Appears euvolemic on exam   · Monitor weights

## 2022-05-16 NOTE — ASSESSMENT & PLAN NOTE
· Confirmed by MRI brain   · Now has baseline left sided weakness and right gaze preference   · Continue PT/OT   · Fall Precautions   · Aspirin and Plavix and Statin  · Can d/c Plavix after 5/24/22 and continue on aspirin 81 daily per hospital records

## 2022-05-16 NOTE — ASSESSMENT & PLAN NOTE
· Treated inpatient with 3 days IV Rocephin and transitioned to PO cefpodoxime 100 mg BID x 4 days for total of 7 days  · Last dose to be given 5/17  · No s/s of uti on exam

## 2022-05-16 NOTE — ASSESSMENT & PLAN NOTE
Lab Results   Component Value Date    HGBA1C 8 2 (H) 05/03/2022     · Per geriatric guidelines, goal HgA1c is > 7 5 to prevent hypoglycemic event in the older adult with cognitive decline  · Continue SSI- Notifiy for BS less than 60 or greater than 450  · Continue Lispro 75/25 - 15 units in AM and 10 units in PM

## 2022-05-16 NOTE — PROGRESS NOTES
[facility]  [address]  [phone]  [place]  Code 31 (STR)      NAME: Misty Rios  AGE: 80 y o  SEX: female CODE STATUS: Do Not Hospitalize    DATE OF ENCOUNTER: 5/16/2022    Assessment and Plan     1  Cerebrovascular accident (CVA) due to stenosis of right posterior cerebral artery [facility])  Assessment & Plan:  · Confirmed by MRI brain   · Now has baseline left sided weakness and right gaze preference   · Continue PT/OT   · Fall Precautions   · Aspirin and Plavix and Statin  · Can d/c Plavix after 5/24/22 and continue on aspirin 81 daily per hospital records       2  Type 2 diabetes mellitus without complication, with long-term current use of insulin ([facility])  Assessment & Plan:    Lab Results   Component Value Date    HGBA1C 8 2 (H) 05/03/2022     · Per geriatric guidelines, goal HgA1c is > 7 5 to prevent hypoglycemic event in the older adult with cognitive decline  · Continue SSI- Notifiy for BS less than 60 or greater than 450  · Continue Lispro 75/25 - 15 units in AM and 10 units in PM        3  Chronic diastolic congestive heart failure [facility])  Assessment & Plan:  Wt Readings from Last 3 Encounters:   05/16/22 58 kg (127 lb 14 4 oz)   05/13/22 58 kg (127 lb 14 4 oz)   05/03/22 57 2 kg (126 lb)     · Recent Echo done inpatient May 2022 - EF60%  wall motion normal, Moderate aortic valve stenoisi, mild mitral valve regurgitation, mild tricuspid valve regurgitation  · Not currently on diuretics were beta-blocker  · BP/heart rate stable  · Appears euvolemic on exam   · Monitor weights           4   Late onset Alzheimer's disease without behavioral disturbance (HCC)  Assessment & Plan:  · Hx of Alzhimer's who then presented to ed with increased confusion and agitation  · found to have CVA on MRI contributing to mental status change as well at probable UTI  · Agitation significanty decreased by time of d/c to SNF  · She is AAOx 1  · Answers simple questions   Provide redirection, reorientation, distraction techniques  Fall Precautions  Assist with ADLs/IADLs  Avoid deliriogenic medications such as tramadol, benzodiazepines, anticholinergics, benadryl  Encourage Hydration/ Nutrition  Implement sleep hygiene, limit night time interuptions   Encourage participation in group activities when appropriate         5  Urinary tract infection without hematuria, site unspecified  Assessment & Plan:  · Treated inpatient with 3 days IV Rocephin and transitioned to PO cefpodoxime 100 mg BID x 4 days for total of 7 days  · Last dose to be given 5/17  · No s/s of uti on exam          All medications and routine orders were reviewed and updated as needed  Chief Complaint     STR follow up visit    Past Medical and Surgica History      Past Medical History:   Diagnosis Date    Aortic valve disorder     CHF (congestive heart failure) ([facility] [address] )     Syncope      Past Surgical History:   Procedure Laterality Date    BLADDER SURGERY      CATARACT EXTRACTION      MASTECTOMY Right     last assessed 7/29/14    TOTAL KNEE ARTHROPLASTY       Allergies   Allergen Reactions    Nitrofurantoin           History of Present Illness     Kyle Dickey is a 80year old female admitted to [address] [place] [address] on 5/12/2022 for STR  Past Medical Hx including but not limited to IDDM, CHF,CVA w/ Right sided weakness, Late onset Alzheimers, Ambulatory dysfuncion seen in collaboration with nursing for medical mgmt and STR follow up  Hospital Course:   Presented to [facility] 5/[facility]/2022 due to CVA  She presented with left sided weakness  and difficulty ambulating  Found to have acute CVA on her MRI brain  Patient admitted on stroke pathway (including ASA, plavix for 3 weeks starting from 5/3/22 till 5/24/22 after which , plavix will be discontinued and ASA 81 mg po daily to be continued  PT/OT and speech were consulted and rehab was recommended   She was found to have UTI during her stay with positive UA- s/p 3 days of IV Rocephin and will be continued even after dc is rehab for another 4 days for a total of 7 days  She was also found to have hyponatremia during her hospitalization  Upon d/c to STR she was noted to have new baseline with left sided weakness and right gaze preference  Rehab:   Seen and examined at bedside today  Patient is a poor historian due to alzheimer's dementia  She is AAO to self only  Some of her responses are delayed  She has clear speech but is not goal oriented  She denies pain on exam  She states she is comfortable  Per review of SNF records, Patient is eating 1-3 meals per day, consuming 25-50%  Incontinent bowel and bladder  Last documented BM was 5/15/2022  Patient is actively participating in therapy, she requires assist x 1 with ADLs and transfers oob with full mechanical lift and assist x2  No concerns from nursing at this time  The patient's allergies, past medical, surgical, social and family history were reviewed and unchanged  Review of Systems     Review of Systems   Unable to perform ROS: Dementia         Objective     Vitals:   Vitals:    05/16/22 1150   BP: 121/95   Pulse: 73   Resp: 18   Temp: 97 5 °F (36 4 °C)   SpO2: 94%         Physical Exam  Vitals and nursing note reviewed  Constitutional:       General: She is not in acute distress  Appearance: Normal appearance  HENT:      Head: Normocephalic and atraumatic  Nose: No congestion or rhinorrhea  Mouth/Throat:      Mouth: Mucous membranes are moist    Eyes:      General: No scleral icterus  Conjunctiva/sclera: Conjunctivae normal       Pupils: Pupils are equal, round, and reactive to light  Cardiovascular:      Rate and Rhythm: Normal rate  Rhythm irregular  Pulses: Normal pulses  Heart sounds: Murmur heard  Pulmonary:      Effort: Pulmonary effort is normal  No respiratory distress  Breath sounds: Normal breath sounds  No wheezing, rhonchi or rales     Abdominal: General: Bowel sounds are normal  There is no distension  Palpations: Abdomen is soft  There is no mass  Tenderness: There is no abdominal tenderness  Hernia: No hernia is present  Musculoskeletal:         General: No swelling or tenderness  Lymphadenopathy:      Cervical: No cervical adenopathy  Skin:     General: Skin is warm and dry  Coloration: Skin is not pale  Findings: No rash  Neurological:      General: No focal deficit present  Mental Status: She is alert  Mental status is at baseline  She is disoriented  GCS: GCS eye subscore is 4  GCS verbal subscore is 4  GCS motor subscore is 6  Sensory: Sensory deficit (decreased sensation to left arm/leg) present  Motor: Weakness present  Coordination: Finger-Nose-Finger Test abnormal       Gait: Gait abnormal    Psychiatric:         Mood and Affect: Mood normal          Speech: Speech is delayed  Behavior: Behavior normal  Behavior is cooperative  Pertinent Laboratory/Diagnostic Studies:   Reviewed in facility chart-stable      Current Medications   Medications reviewed and updated see facility [facility] for details  Current Outpatient Medications:     aspirin 81 mg chewable tablet, Chew 1 tablet (81 mg total) daily, Disp: , Rfl: 0    atorvastatin (LIPITOR) 40 mg tablet, Take 1 tablet (40 mg total) by mouth every evening, Disp: , Rfl: 0    BD Insulin Syringe U/F 31G X 5/16" 0 5 ML MISC, inject under the skin twice daily, Disp: 200 each, Rfl: 3    cefpodoxime (VANTIN) 100 mg tablet, Take 100 mg by mouth in the morning and 100 mg in the evening  X 4 days  , Disp: , Rfl:     clopidogrel (PLAVIX) 75 mg tablet, Take 1 tablet (75 mg total) by mouth daily, Disp: , Rfl: 0    insulin aspart protamine-insulin aspart (NovoLOG 70/30) 100 units/mL injection, Inject 10 Units under the skin every evening, Disp: 1 mL, Rfl: 0    insulin aspart protamine-insulin aspart (NovoLOG 70/30) 100 units/mL injection, Inject 15 Units under the skin every morning, Disp: 1 mL, Rfl: 0    insulin lispro (HumaLOG) 100 units/mL injection, Inject 1-5 Units under the skin in the morning and 1-5 Units at noon and 1-5 Units in the evening  Inject before meals  , Disp: 3 mL, Rfl: 0    Insulin Syringe-Needle U-100 (B-D INS SYRINGE 0 5CC/31GX5/16) 31G X 5/16" 0 5 ML MISC, by Does not apply route, Disp: , Rfl:     Lancets (FREESTYLE) lancets, by Does not apply route, Disp: , Rfl:       Plan discussed with Dr Sujatha Benton noted agreement with assessment and plan  Please note:  Voice-recognition software may have been used in the preparation of this document  Occasional wrong word or "sound-alike" substitutions may have occurred due to the inherent limitations of voice recognition software  Interpretation should be guided by context           BRIAN Frazier  5/16/2022  11:51 AM

## 2022-05-16 NOTE — ASSESSMENT & PLAN NOTE
· Hx of Alzhimer's who then presented to ed with increased confusion and agitation  · found to have CVA on MRI contributing to mental status change as well at probable UTI  · Agitation significanty decreased by time of d/c to SNF  · She is AAOx 1  · Answers simple questions   Provide redirection, reorientation, distraction techniques  Fall Precautions  Assist with ADLs/IADLs  Avoid deliriogenic medications such as tramadol, benzodiazepines, anticholinergics, benadryl  Encourage Hydration/ Nutrition  Implement sleep hygiene, limit night time interuptions   Encourage participation in group activities when appropriate

## 2022-05-17 LAB
BACTERIA BLD CULT: NORMAL
BACTERIA BLD CULT: NORMAL

## 2022-05-18 ENCOUNTER — NURSING HOME VISIT (OUTPATIENT)
Dept: WOUND CARE | Facility: HOSPITAL | Age: 87
End: 2022-05-18
Payer: MEDICARE

## 2022-05-18 DIAGNOSIS — R26.2 AMBULATORY DYSFUNCTION: ICD-10-CM

## 2022-05-18 DIAGNOSIS — T14.8XXA OPEN WOUND: ICD-10-CM

## 2022-05-18 DIAGNOSIS — L89.312 PRESSURE INJURY OF RIGHT BUTTOCK, STAGE 2 (HCC): Primary | ICD-10-CM

## 2022-05-18 PROCEDURE — 99304 1ST NF CARE SF/LOW MDM 25: CPT | Performed by: NURSE PRACTITIONER

## 2022-05-18 NOTE — PATIENT INSTRUCTIONS
Orders Placed This Encounter   Procedures    Wound cleansing and dressings     Wound:  Buttock  Discontinue previous wound order  Cleanse the skin with soap and water, pat dry  Apply z-guard to skin  Frequency : twice a day and prn for soiling  Offload all wounds  Turn and reposition frequently,   Increase protein intake  Monitor for any sign of infection or worsening, inform PCP or patient's primary physician in your facility       Standing Status:   Future     Standing Expiration Date:   5/18/2023

## 2022-05-18 NOTE — LETTER
Patient:  [facility]   8/30/1930           BRIAN Gilliam saw [facility] for a wound care visit on 5/18/2022  See below for information relating to this visit  Chief Complaint   Patient presents with   [address] [facility] [address] Patient Visit     Right buttock, left lower leg, left inner ankle        Assessment/Plan:  1  Pressure injury of right buttock, stage 2 ([address] )  Assessment & Plan:  Right buttock  - resolved  - zguard for prevention  - continue to offload    Orders:  -     Wound cleansing and dressings; Future    2  Open wound  Assessment & Plan:  Left lower leg and left inner ankle  - unknown etiology, probably from a fall  All wounds present prior to admission to skilled nursing facility  - wound on the left lower leg and left inner ankle - superficial, covered with scab and eschar  - continue to monitor   - Sign off  Facility staff will continue to provide treatment and monitor the wound  Can reconsult wound nurse practitioner if wound failed to heal or worsened  Orders:  -     Wound cleansing and dressings; Future    3  Ambulatory dysfunction  Assessment & Plan:  - on STR             Orders:  [facility]  8/30/1930  Orders Placed This Encounter   Procedures    Wound cleansing and dressings     Wound:  Buttock  Discontinue previous wound order  Cleanse the skin with soap and water, pat dry  Apply z-guard to skin  Frequency : twice a day and prn for soiling  Offload all wounds  Turn and reposition frequently,   Increase protein intake  Monitor for any sign of infection or worsening, inform PCP or patient's primary physician in your facility  Standing Status:   Future     Standing Expiration Date:   5/18/2023         Follow Up:  Return if symptoms worsen or fail to improve  [address] hours are 8:00 am - 4:30 pm Monday through Friday  The center phone number is [phone]  You can also contact me directly thru my email at [facility]  [email or web address] or thru tiger text  If it is an emergency, please contact the PCP or patient's attending physician in your facility       Sincerely,    Electronically signed by BRIAN Cobb    Patient : Kyle Dickey    8/30/1930

## 2022-05-18 NOTE — ASSESSMENT & PLAN NOTE
Left lower leg and left inner ankle  - unknown etiology, probably from a fall  All wounds present prior to admission to skilled nursing facility  - wound on the left lower leg and left inner ankle - superficial, covered with scab and eschar  - continue to monitor   - Sign off  Facility staff will continue to provide treatment and monitor the wound  Can reconsult wound nurse practitioner if wound failed to heal or worsened

## 2022-05-19 ENCOUNTER — NURSING HOME VISIT (OUTPATIENT)
Dept: GERIATRICS | Facility: OTHER | Age: 87
End: 2022-05-19
Payer: MEDICARE

## 2022-05-19 ENCOUNTER — PATIENT OUTREACH (OUTPATIENT)
Dept: CASE MANAGEMENT | Facility: HOSPITAL | Age: 87
End: 2022-05-19

## 2022-05-19 VITALS
RESPIRATION RATE: 14 BRPM | TEMPERATURE: 97.5 F | WEIGHT: 121.7 LBS | DIASTOLIC BLOOD PRESSURE: 62 MMHG | HEART RATE: 80 BPM | SYSTOLIC BLOOD PRESSURE: 118 MMHG | BODY MASS INDEX: 23.77 KG/M2 | OXYGEN SATURATION: 95 %

## 2022-05-19 DIAGNOSIS — F02.80 LATE ONSET ALZHEIMER'S DISEASE WITHOUT BEHAVIORAL DISTURBANCE (HCC): ICD-10-CM

## 2022-05-19 DIAGNOSIS — I50.32 CHRONIC DIASTOLIC CONGESTIVE HEART FAILURE (HCC): ICD-10-CM

## 2022-05-19 DIAGNOSIS — E11.9 TYPE 2 DIABETES MELLITUS WITHOUT COMPLICATION, WITH LONG-TERM CURRENT USE OF INSULIN (HCC): Primary | ICD-10-CM

## 2022-05-19 DIAGNOSIS — I63.531 CEREBROVASCULAR ACCIDENT (CVA) DUE TO STENOSIS OF RIGHT POSTERIOR CEREBRAL ARTERY (HCC): ICD-10-CM

## 2022-05-19 DIAGNOSIS — G30.1 LATE ONSET ALZHEIMER'S DISEASE WITHOUT BEHAVIORAL DISTURBANCE (HCC): ICD-10-CM

## 2022-05-19 DIAGNOSIS — Z79.4 TYPE 2 DIABETES MELLITUS WITHOUT COMPLICATION, WITH LONG-TERM CURRENT USE OF INSULIN (HCC): Primary | ICD-10-CM

## 2022-05-19 PROCEDURE — 99309 SBSQ NF CARE MODERATE MDM 30: CPT | Performed by: NURSE PRACTITIONER

## 2022-05-19 NOTE — PROGRESS NOTES
[facility]  [address]  [phone]  [place]  Code 31 (STR)      NAME: Chetna Amado  AGE: 80 y o  SEX: female CODE STATUS: Do Not Hospitalize    DATE OF ENCOUNTER: 5/19/2022    Assessment and Plan     1  Type 2 diabetes mellitus without complication, with long-term current use of insulin (HCC)  Assessment & Plan:    Lab Results   Component Value Date    HGBA1C 8 2 (H) 05/03/2022     · Per geriatric guidelines, goal HgA1c is > 7 5 to prevent hypoglycemic event in the older adult with cognitive decline  · Continue SSI- Notifiy for BS less than 60 or greater than 450  · Continue Lispro 75/25 - 15 units in AM and 10 units in PM        2  Late onset Alzheimer's disease without behavioral disturbance (HCC)  Assessment & Plan:  · Hx of Alzhimer's who then presented to ed with increased confusion and agitation  · found to have CVA on MRI contributing to mental status change as well at probable UTI  · Agitation significanty decreased by time of d/c to SNF  · She is AAOx 1  · Answers simple questions   Provide redirection, reorientation, distraction techniques  Fall Precautions  Assist with ADLs/IADLs  Avoid deliriogenic medications such as tramadol, benzodiazepines, anticholinergics, benadryl  Encourage Hydration/ Nutrition  Implement sleep hygiene, limit night time interuptions   Encourage participation in group activities when appropriate       Notified by Therapy and  that Patient will d/c to home on 5/24/22 on home hospice       3   Cerebrovascular accident (CVA) due to stenosis of right posterior cerebral artery [facility])  Assessment & Plan:  · Confirmed by MRI brain   · Now has baseline left sided weakness and right gaze preference   · Continue PT/OT   · Fall Precautions   · Aspirin and Plavix and Statin  · Can d/c Plavix after 5/24/22 and continue on aspirin 81 daily per hospital records     Notified by Therapy and  that patient will d/c home on hospice on 5/24/22      4  Chronic diastolic congestive heart failure [facility])  Assessment & Plan:  Wt Readings from Last 3 Encounters:   05/19/22 55 2 kg (121 lb 11 2 oz)   05/16/22 58 kg (127 lb 14 4 oz)   05/13/22 58 kg (127 lb 14 4 oz)     · Recent Echo done inpatient May 2022 - EF60%  wall motion normal, Moderate aortic valve stenoisi, mild mitral valve regurgitation, mild tricuspid valve regurgitation  · Not currently on diuretics were beta-blocker  · BP/heart rate stable  · Appears euvolemic on exam   · Monitor weights              All medications and routine orders were reviewed and updated as needed  Chief Complaint     STR follow up visit    Past Medical and Surgica History      Past Medical History:   Diagnosis Date    Aortic valve disorder     CHF [facility]congestive heart failure) ([facility] [address] )     Syncope      Past Surgical History:   Procedure Laterality Date    BLADDER SURGERY      CATARACT EXTRACTION      MASTECTOMY Right     last assessed 7/29/14    TOTAL KNEE ARTHROPLASTY       Allergies   Allergen Reactions    Nitrofurantoin           History of Present Illness     Armani Melvin is a 80year old female admitted to [facility] on 5/12/2022 for STR  Past Medical Hx including but not limited to IDDM, CHF,CVA w/ Right sided weakness, Late onset Alzheimers, Ambulatory dysfuncion seen in collaboration with nursing for medical mgmt and STR follow up  Hospital Course:   Presented to [facility] 5/2/2022 due to CVA  She presented with left sided weakness  and difficulty ambulating  Found to have acute CVA on her MRI brain  Patient admitted on stroke pathway (including ASA, plavix for 3 weeks starting from 5/3/22 till 5/24/22 after which , plavix will be discontinued and ASA 81 mg po daily to be continued  PT/OT and speech were consulted and rehab was recommended   She was found to have UTI during her stay with positive UA- s/p 3 days of IV Rocephin and will be continued even after dc is rehab for another 4 days for a total of 7 days  She was also found to have hyponatremia during her hospitalization  Upon d/c to STR she was noted to have new baseline with left sided weakness and right gaze preference  Rehab:   Seen and examined at bedside today  Patient is a poor historian due to alzheimer's dementia  She is AAO to self only  Some of her responses are delayed  She has clear speech but is not goal oriented  She denies pain on exam  She states she is comfortable  Per review of SNF records, Patient is eating 3 meals per day, consuming 25-75%  Incontinent bowel and bladder  Last documented BM was 5/19/2022  Patient is actively participating in therapy, she requires assist x 1 with ADLs and transfers oob with full mechanical lift and assist x2  No concerns from nursing at this time  The patient's allergies, past medical, surgical, social and family history were reviewed and unchanged  Review of Systems     Review of Systems   Unable to perform ROS: Dementia         Objective     Vitals:   Vitals:    05/19/22 1614   BP: 118/62   Pulse: 80   Resp: 14   Temp: 97 5 °F (36 4 °C)   SpO2: 95%         Physical Exam  Vitals and nursing note reviewed  Constitutional:       General: She is not in acute distress  Appearance: Normal appearance  HENT:      Head: Normocephalic and atraumatic  Nose: No congestion or rhinorrhea  Mouth/Throat:      Mouth: Mucous membranes are moist    Eyes:      General: No scleral icterus  Conjunctiva/sclera: Conjunctivae normal       Pupils: Pupils are equal, round, and reactive to light  Cardiovascular:      Rate and Rhythm: Normal rate  Rhythm irregular  Pulses: Normal pulses  Heart sounds: Murmur heard  Pulmonary:      Effort: Pulmonary effort is normal  No respiratory distress  Breath sounds: Normal breath sounds  No wheezing, rhonchi or rales  Abdominal:      General: Bowel sounds are normal  There is no distension  Palpations: Abdomen is soft  There is no mass  Tenderness: There is no abdominal tenderness  Hernia: No hernia is present  Musculoskeletal:         General: No swelling or tenderness  Lymphadenopathy:      Cervical: No cervical adenopathy  Skin:     General: Skin is warm and dry  Coloration: Skin is not pale  Findings: No rash  Neurological:      General: No focal deficit present  Mental Status: She is alert  Mental status is at baseline  She is disoriented  GCS: GCS eye subscore is 4  GCS verbal subscore is 4  GCS motor subscore is 6  Sensory: Sensory deficit (decreased sensation to left arm/leg) present  Motor: Weakness present  Coordination: Finger-Nose-Finger Test abnormal       Gait: Gait abnormal    Psychiatric:         Mood and Affect: Mood normal          Speech: Speech is delayed  Behavior: Behavior normal  Behavior is cooperative  Pertinent Laboratory/Diagnostic Studies:   Reviewed in facility chart-stable      Current Medications   Medications reviewed and updated see facility [facility] for details  Current Outpatient Medications:     aspirin 81 mg chewable tablet, Chew 1 tablet (81 mg total) daily, Disp: , Rfl: 0    atorvastatin (LIPITOR) 40 mg tablet, Take 1 tablet (40 mg total) by mouth every evening, Disp: , Rfl: 0    BD Insulin Syringe U/F 31G X 5/16" 0 5 ML MISC, inject under the skin twice daily, Disp: 200 each, Rfl: 3    cefpodoxime (VANTIN) 100 mg tablet, Take 100 mg by mouth in the morning and 100 mg in the evening  X 4 days  , Disp: , Rfl:     clopidogrel (PLAVIX) 75 mg tablet, Take 1 tablet (75 mg total) by mouth daily, Disp: , Rfl: 0    insulin aspart protamine-insulin aspart (NovoLOG 70/30) 100 units/mL injection, Inject 10 Units under the skin every evening, Disp: 1 mL, Rfl: 0    insulin aspart protamine-insulin aspart (NovoLOG 70/30) 100 units/mL injection, Inject 15 Units under the skin every morning, Disp: 1 mL, Rfl: 0    insulin lispro (HumaLOG) 100 units/mL injection, Inject 1-5 Units under the skin in the morning and 1-5 Units at noon and 1-5 Units in the evening  Inject before meals  , Disp: 3 mL, Rfl: 0    Insulin Syringe-Needle U-100 (B-D INS SYRINGE 0 5CC/31GX5/16) 31G X 5/16" 0 5 ML MISC, by Does not apply route, Disp: , Rfl:     Lancets (FREESTYLE) lancets, by Does not apply route, Disp: , Rfl:       Plan discussed with Dr Collin Null noted agreement with assessment and plan  Please note:  Voice-recognition software may have been used in the preparation of this document  Occasional wrong word or "sound-alike" substitutions may have occurred due to the inherent limitations of voice recognition software  Interpretation should be guided by BRIAN Keenan  5/19/2022  4:15 PM

## 2022-05-19 NOTE — ASSESSMENT & PLAN NOTE
· Hx of Alzhimer's who then presented to ed with increased confusion and agitation  · found to have CVA on MRI contributing to mental status change as well at probable UTI  · Agitation significanty decreased by time of d/c to SNF  · She is AAOx 1  · Answers simple questions   Provide redirection, reorientation, distraction techniques  Fall Precautions  Assist with ADLs/IADLs  Avoid deliriogenic medications such as tramadol, benzodiazepines, anticholinergics, benadryl  Encourage Hydration/ Nutrition  Implement sleep hygiene, limit night time interuptions   Encourage participation in group activities when appropriate       Notified by Therapy and  that Patient will d/c to home on 5/24/22 on home hospice

## 2022-05-19 NOTE — ASSESSMENT & PLAN NOTE
Wt Readings from Last 3 Encounters:   05/19/22 55 2 kg (121 lb 11 2 oz)   05/16/22 58 kg (127 lb 14 4 oz)   05/13/22 58 kg (127 lb 14 4 oz)     · Recent Echo done inpatient May 2022 - EF60%   wall motion normal, Moderate aortic valve stenoisi, mild mitral valve regurgitation, mild tricuspid valve regurgitation  · Not currently on diuretics were beta-blocker  · BP/heart rate stable  · Appears euvolemic on exam   · Monitor weights

## 2022-05-19 NOTE — ASSESSMENT & PLAN NOTE
· Confirmed by MRI brain   · Now has baseline left sided weakness and right gaze preference   · Continue PT/OT   · Fall Precautions   · Aspirin and Plavix and Statin  · Can d/c Plavix after 5/24/22 and continue on aspirin 81 daily per hospital records     Notified by Therapy and  that patient will d/c home on hospice on 5/24/22

## 2022-05-20 NOTE — PROGRESS NOTES
[address] MANAGEMENT   AND HYPERBARIC MEDICINE CENTER       Patient ID: Michael Borjas is a 80 y o  female Date of Birth 8/30/1930     Location of Service: [facility]    Performed wound round with: Wound team       Chief Complaint   Patient presents with   [address] Patient Visit     Right buttock, left lower leg, left inner ankle       Wound Instructions:  Orders Placed This Encounter   Procedures    Wound cleansing and dressings     Wound:  Buttock  Discontinue previous wound order  Cleanse the skin with soap and water, pat dry  Apply z-guard to skin  Frequency : twice a day and prn for soiling  Offload all wounds  Turn and reposition frequently,   Increase protein intake  Monitor for any sign of infection or worsening, inform PCP or patient's primary physician in your facility  Standing Status:   Future     Standing Expiration Date:   5/18/2023       Allergies  Nitrofurantoin      Assessment & Plan:  1  Pressure injury of right buttock, stage 2 ([facility] [address] )  Assessment & Plan:  Right buttock  - resolved  - zguard for prevention  - continue to offload    Orders:  -     Wound cleansing and dressings; Future    2  Open wound  Assessment & Plan:  Left lower leg and left inner ankle  - unknown etiology, probably from a fall  All wounds present prior to admission to skilled nursing facility  - wound on the left lower leg and left inner ankle - superficial, covered with scab and eschar  - continue to monitor   - Sign off  Facility staff will continue to provide treatment and monitor the wound  Can reconsult wound nurse practitioner if wound failed to heal or worsened  Orders:  -     Wound cleansing and dressings; Future    3  Ambulatory dysfunction  Assessment & Plan:  - on STR             Subjective: This is a 22-year-old female referred to our service for wound on the right buttock, left lower leg, and left ankle,  Patient have a complex medical history including but not limited to  diabetes mellitus type 2, CHF and Alzheimer's disease  Patient was referred by Senior Care Team  Patient was seen in collaboration with the facility wound team      Received patient in bed, seems comfortable  As per report, the patient was admitted with all the wounds  Current treatment on the right buttock is Z guard  Patient consume 50-75% of her meals  Currently on house shakes for nutritional supplement  Patient is incontinent of both bowel in bladder  No other significant issues related to the wound  Review of Systems   Unable to perform ROS: Dementia       Objective: There were no vitals taken for this visit  Physical Exam  Constitutional:       Appearance: Normal appearance  HENT:      Head: Normocephalic and atraumatic  Nose: Nose normal       Mouth/Throat:      Pharynx: Oropharynx is clear  Eyes:      Conjunctiva/sclera: Conjunctivae normal    Cardiovascular:      Rate and Rhythm: Normal rate  Pulmonary:      Effort: Pulmonary effort is normal    Abdominal:      Tenderness: There is no abdominal tenderness  There is no guarding  Genitourinary:     Comments: incontinent  Musculoskeletal:         General: No tenderness  Cervical back: Normal range of motion  Right lower leg: No edema  Left lower leg: No edema  Comments: LROM   Skin:     General: Skin is warm  Findings: Lesion present  Comments: Right buttock - dry skin , about 1 x 1 x 0 1 cm  , no obvious sign of infection    Left knee - healed    Left lower leg - wound size is 5 5 x 0 5 x 0 01 cm , 100% scab, with no obvious sign of infection, no malodor    Right medial ankle - wound size is 0 2 x 0 2 x 0 01 cm , 100% eschar, stable, with no obvious sign of infection, denies pain   Neurological:      Mental Status: She is alert        Gait: Gait abnormal    Psychiatric:         Mood and Affect: Mood normal          Behavior: Behavior normal               Procedures           Patient's care was coordinated with nursing facility staff  Recent vitals, labs and updated medications were reviewed on EMR or chart system of facility  Past Medical, surgical, social, medication and allergy history and patient's previous records were reviewed and updated as appropriate:     Patient Active Problem List   Diagnosis    Type 2 diabetes mellitus without complication, with long-term current use of insulin ([facility] )    Chronic diastolic congestive heart failure ([facility] )    Aortic valve disorder    Need for immunization against influenza    Syncope    Pressure injury of left buttock, stage 1    Late onset Alzheimer's disease without behavioral disturbance ([facility] )    [facility]    Cerebrovascular accident (CVA) due to stenosis of right posterior cerebral artery ([facility] )    Urinary frequency    Hyponatremia    UTI (urinary tract infection)    Ambulatory dysfunction    Right sided weakness    Pressure injury of right buttock, stage 2 ([facility]    Open wound     Past Medical History:   Diagnosis Date    Aortic valve disorder     CHF (congestive heart failure) ([facility] )     Syncope      Past Surgical History:   Procedure Laterality Date    BLADDER SURGERY      CATARACT EXTRACTION      MASTECTOMY Right     last assessed 7/29/14    TOTAL KNEE ARTHROPLASTY       Social History     Socioeconomic History    Marital status:       Spouse name: None    Number of children: None    Years of education: None    Highest education level: None   Occupational History    None   Tobacco Use    Smoking status: Never Smoker    Smokeless tobacco: Never Used    Tobacco comment: tobacco non user   Vaping Use    Vaping Use: Never used   Substance and Sexual Activity    Alcohol use: Never    Drug use: No    Sexual activity: Not Currently   Other Topics Concern    None   Social History Narrative    None     Social Determinants of Health     Financial Resource Strain: Not on file   Food Insecurity: No Food Insecurity    Worried About [facility] in the Last Year: Never true    Karlo of Food in the Last Year: Never true   Transportation Needs: No Transportation Needs    Lack of Transportation (Medical): No    Lack of Transportation (Non-Medical): No   Physical Activity: Not on file   Stress: Not on file   Social Connections: Not on file   Intimate Partner Violence: Not on file   Housing Stability: Low Risk     Unable to Pay for Housing in the Last Year: No    Number of Places Lived in the Last Year: 1    Unstable Housing in the Last Year: No        Current Outpatient Medications:     aspirin 81 mg chewable tablet, Chew 1 tablet (81 mg total) daily, Disp: , Rfl: 0    atorvastatin (LIPITOR) 40 mg tablet, Take 1 tablet (40 mg total) by mouth every evening, Disp: , Rfl: 0    BD Insulin Syringe U/F 31G X 5/16" 0 5 ML MISC, inject under the skin twice daily, Disp: 200 each, Rfl: 3    cefpodoxime (VANTIN) 100 mg tablet, Take 100 mg by mouth in the morning and 100 mg in the evening  X 4 days  , Disp: , Rfl:     clopidogrel (PLAVIX) 75 mg tablet, Take 1 tablet (75 mg total) by mouth daily, Disp: , Rfl: 0    insulin aspart protamine-insulin aspart (NovoLOG 70/30) 100 units/mL injection, Inject 10 Units under the skin every evening, Disp: 1 mL, Rfl: 0    insulin aspart protamine-insulin aspart (NovoLOG 70/30) 100 units/mL injection, Inject 15 Units under the skin every morning, Disp: 1 mL, Rfl: 0    insulin lispro (HumaLOG) 100 units/mL injection, Inject 1-5 Units under the skin in the morning and 1-5 Units at noon and 1-5 Units in the evening  Inject before meals  , Disp: 3 mL, Rfl: 0    Insulin Syringe-Needle U-100 (B-D INS SYRINGE 0 5CC/31GX5/16) 31G X 5/16" 0 5 ML MISC, by Does not apply route, Disp: , Rfl:     Lancets (FREESTYLE) lancets, by Does not apply route, Disp: , Rfl:   History reviewed  No pertinent family history  Coordination of Care: Wound team aware of the treatment plan   Facility nurse will provide wound treatment and monitor the wound for any changes  Patient / Staff education : Staff was given education on sign of infection and pressure ulcer prevention  Staff verbalized understanding     Follow up :  Return if symptoms worsen or fail to improve  Voice-recognition software may have been used in the preparation of this document  Occasional wrong word or "sound-alike" substitutions may have occurred due to the inherent limitations of voice recognition software  Interpretation should be guided by context        BRIAN Perez

## 2022-05-23 ENCOUNTER — TELEPHONE (OUTPATIENT)
Dept: OTHER | Facility: OTHER | Age: 87
End: 2022-05-23

## 2022-05-23 ENCOUNTER — NURSING HOME VISIT (OUTPATIENT)
Dept: GERIATRICS | Facility: OTHER | Age: 87
End: 2022-05-23
Payer: MEDICARE

## 2022-05-23 VITALS
WEIGHT: 134.6 LBS | SYSTOLIC BLOOD PRESSURE: 134 MMHG | HEART RATE: 87 BPM | RESPIRATION RATE: 18 BRPM | BODY MASS INDEX: 26.29 KG/M2 | TEMPERATURE: 96.6 F | OXYGEN SATURATION: 97 % | DIASTOLIC BLOOD PRESSURE: 66 MMHG

## 2022-05-23 DIAGNOSIS — I50.32 CHRONIC DIASTOLIC CONGESTIVE HEART FAILURE (HCC): ICD-10-CM

## 2022-05-23 DIAGNOSIS — Z79.4 TYPE 2 DIABETES MELLITUS WITHOUT COMPLICATION, WITH LONG-TERM CURRENT USE OF INSULIN (HCC): ICD-10-CM

## 2022-05-23 DIAGNOSIS — G30.1 LATE ONSET ALZHEIMER'S DISEASE WITHOUT BEHAVIORAL DISTURBANCE (HCC): ICD-10-CM

## 2022-05-23 DIAGNOSIS — N39.0 URINARY TRACT INFECTION WITHOUT HEMATURIA, SITE UNSPECIFIED: ICD-10-CM

## 2022-05-23 DIAGNOSIS — E11.9 TYPE 2 DIABETES MELLITUS WITHOUT COMPLICATION, WITH LONG-TERM CURRENT USE OF INSULIN (HCC): ICD-10-CM

## 2022-05-23 DIAGNOSIS — I63.531 CEREBROVASCULAR ACCIDENT (CVA) DUE TO STENOSIS OF RIGHT POSTERIOR CEREBRAL ARTERY (HCC): Primary | ICD-10-CM

## 2022-05-23 DIAGNOSIS — F02.80 LATE ONSET ALZHEIMER'S DISEASE WITHOUT BEHAVIORAL DISTURBANCE (HCC): ICD-10-CM

## 2022-05-23 DIAGNOSIS — R26.2 AMBULATORY DYSFUNCTION: ICD-10-CM

## 2022-05-23 PROCEDURE — 99316 NF DSCHRG MGMT 30 MIN+: CPT | Performed by: NURSE PRACTITIONER

## 2022-05-23 RX ORDER — ASPIRIN 81 MG/1
81 TABLET, CHEWABLE ORAL DAILY
Qty: 30 TABLET | Refills: 0 | Status: SHIPPED | OUTPATIENT
Start: 2022-05-23 | End: 2022-06-08

## 2022-05-23 RX ORDER — ATORVASTATIN CALCIUM 40 MG/1
40 TABLET, FILM COATED ORAL EVERY EVENING
Qty: 30 TABLET | Refills: 0 | Status: SHIPPED | OUTPATIENT
Start: 2022-05-23 | End: 2022-06-08

## 2022-05-23 NOTE — ASSESSMENT & PLAN NOTE
· Completed therapy at [facility]  · She has Right sided weakness with ataxia s/p CVA  · She has underlying Alzheimers Dementia contributing to limited progress with therapy   · She requires assistance with ADLs/IADLs- assistance to feed   · She needs Mechanical lift oob to chair   · She will d/c home with [facility] services tomorrow

## 2022-05-23 NOTE — ASSESSMENT & PLAN NOTE
Wt Readings from Last 3 Encounters:   05/23/22 61 1 kg (134 lb 9 6 oz)   05/19/22 55 2 kg (121 lb 11 2 oz)   05/16/22 58 kg (127 lb 14 4 oz)     · Recent Echo done inpatient May 2022 - EF60%   wall motion normal, Moderate aortic valve stenoisi, mild mitral valve regurgitation, mild tricuspid valve regurgitation  · Not currently on diuretics were beta-blocker  · BP/heart rate stable  · Appears euvolemic on exam   · Monitor weights

## 2022-05-23 NOTE — ASSESSMENT & PLAN NOTE
Lab Results   Component Value Date    HGBA1C 8 2 (H) 05/03/2022     · Per geriatric guidelines, goal HgA1c is > 7 5 to prevent hypoglycemic event in the older adult with cognitive decline  · D/C SSI  · Continue Lispro 75/25 - 15 units in AM and 10 units in PM- will defer to Hospice for management after d/c from SNF

## 2022-05-23 NOTE — ASSESSMENT & PLAN NOTE
· Treated inpatient with 3 days IV Rocephin and transitioned to PO cefpodoxime 100 mg BID x 4 days for total of 7 days  · Last dose  given 5/17  · No s/s of uti on exam

## 2022-05-23 NOTE — PROGRESS NOTES
[facility]  [address]  [phone]  [address]  [address]    NAME: Bette Mccauley  AGE: 80 y o  SEX: female   CODE STATUS: Do Not Hospitalize    DATE OF ADMISSION: 5/12/2022   DATE OF DISCHARGE: 5/24/2022   DISCHARGE DISPOSITION: Stable for discharge to home with family support and home health PT/OT/SN services  Reason for Admission: Patient was admitted to [facility] for rehabilitation after hospitalization for CVA  Past Medical and Surgical History:   Past Medical History:   Diagnosis Date    Aortic valve disorder     CHF (congestive heart failure) ([address] )     Syncope       Past Surgical History:   Procedure Laterality Date    BLADDER SURGERY      CATARACT EXTRACTION      MASTECTOMY Right     last assessed 7/29/14    TOTAL KNEE ARTHROPLASTY         Course of stay:   Bette Mccauley is a 80year old female admitted to [facility] on 5/12/2022 for STR  Past Medical Hx including but not limited to IDDM, CHF,CVA w/ Right sided weakness, Late onset Alzheimers, Ambulatory dysfuncion seen in collaboration with nursing for medical mgmt and discharge visit  Hospital Course:   Presented to [facility] 5/2/2022 due to CVA  She presented with left sided weakness  and difficulty ambulating  Found to have acute CVA on her MRI brain  Patient admitted on stroke pathway (including ASA, plavix for 3 weeks starting from 5/3/22 till 5/24/22 after which , plavix will be discontinued and ASA 81 mg po daily to be continued  PT/OT and speech were consulted and rehab was recommended  She was found to have UTI during her stay with positive UA- s/p 3 days of IV Rocephin and will be continued even after dc is rehab for another 4 days for a total of 7 days  She was also found to have hyponatremia during her hospitalization  Upon d/c to STR she was noted to have new baseline with left sided weakness and right gaze preference         Rehab:   Seen and examined at bedside today  Patient is a poor historian due to alzheimer's dementia  She is AAO to self only  Some of her responses are delayed  She has clear speech but is not goal oriented  She denies pain on exam  She states she is comfortable  Per review of SNF records, Patient is eating 3 meals per day, consuming 25-75%  Incontinent bowel and bladder  Last documented BM was 5/22/2022  Patient has actively participated in therapy, she requires assist x 1 with ADLs and transfers oob with full mechanical lift and assist x2  No concerns from nursing at this time  Per , patient will d/c home on hospice  ROS:  Review of Systems   Unable to perform ROS: Dementia       PHYSICAL EXAM:  VITALS:   Vitals:    05/23/22 0903   BP: 134/66   Pulse: 87   Resp: 18   Temp: (!) 96 6 °F (35 9 °C)   SpO2: 97%        Physical Exam  Vitals and nursing note reviewed  Constitutional:       General: She is not in acute distress  Appearance: Normal appearance  HENT:      Head: Normocephalic and atraumatic  Nose: No congestion or rhinorrhea  Mouth/Throat:      Mouth: Mucous membranes are moist    Eyes:      General: No scleral icterus  Conjunctiva/sclera: Conjunctivae normal       Pupils: Pupils are equal, round, and reactive to light  Cardiovascular:      Rate and Rhythm: Normal rate  Rhythm irregular  Pulses: Normal pulses  Heart sounds: Murmur heard  Pulmonary:      Effort: Pulmonary effort is normal  No respiratory distress  Breath sounds: Normal breath sounds  No wheezing, rhonchi or rales  Abdominal:      General: Bowel sounds are normal  There is no distension  Palpations: Abdomen is soft  There is no mass  Tenderness: There is no abdominal tenderness  Hernia: No hernia is present  Musculoskeletal:         General: No swelling or tenderness  Lymphadenopathy:      Cervical: No cervical adenopathy     Skin:     General: Skin is warm and dry       Coloration: Skin is not pale  Findings: No rash  Neurological:      General: No focal deficit present  Mental Status: She is alert  Mental status is at baseline  She is disoriented  GCS: GCS eye subscore is 4  GCS verbal subscore is 4  GCS motor subscore is 6  Sensory: Sensory deficit (decreased sensation to left arm/leg) present  Motor: Weakness present  Coordination: Finger-Nose-Finger Test abnormal       Gait: Gait abnormal    Psychiatric:         Mood and Affect: Mood normal          Speech: Speech is delayed  Behavior: Behavior normal  Behavior is cooperative  Admission Diagnoses:     1  Cerebrovascular accident (CVA) due to stenosis of right posterior cerebral artery [facility])  Assessment & Plan:  · Confirmed by MRI brain   · Now has baseline left sided weakness and right gaze preference   · Continue PT/OT   · Fall Precautions   · Aspirin and Plavix and Statin  · Can d/c Plavix after 5/24/22 and continue on aspirin 81 daily per hospital records     Notified by Therapy and  that patient will d/c home on hospice on 5/24/22      2  Ambulatory dysfunction  Assessment & Plan:  · Completed therapy at [facility]  · She has Right sided weakness with ataxia s/p CVA  · She has underlying Alzheimers Dementia contributing to limited progress with therapy   · She requires assistance with ADLs/IADLs- assistance to feed   · She needs Mechanical lift oob to chair   · She will d/c home with [facility] services tomorrow     Orders:  -     aspirin 81 mg chewable tablet; Chew 1 tablet (81 mg total)  in the morning   -     atorvastatin (LIPITOR) 40 mg tablet; Take 1 tablet (40 mg total) by mouth every evening    3   Late onset Alzheimer's disease without behavioral disturbance ([facility] [address] )  Assessment & Plan:  · Hx of Alzhimer's who then presented to ed with increased confusion and agitation  · found to have CVA on MRI contributing to mental status change as well at probable UTI  · Agitation significanty decreased by time of d/c to SNF  · She is AAOx 1  · Answers simple questions   Provide redirection, reorientation, distraction techniques  Fall Precautions  Assist with ADLs/IADLs  Avoid deliriogenic medications such as tramadol, benzodiazepines, anticholinergics, benadryl  Encourage Hydration/ Nutrition  Implement sleep hygiene, limit night time interuptions   Encourage participation in group activities when appropriate       Notified by Therapy and  that Patient will d/c to home on 5/24/22 on home hospice     Orders:  -     aspirin 81 mg chewable tablet; Chew 1 tablet (81 mg total)  in the morning   -     atorvastatin (LIPITOR) 40 mg tablet; Take 1 tablet (40 mg total) by mouth every evening    4  Type 2 diabetes mellitus without complication, with long-term current use of insulin ([facility])  Assessment & Plan:    Lab Results   Component Value Date    HGBA1C 8 2 (H) 05/03/2022     · Per geriatric guidelines, goal HgA1c is > 7 5 to prevent hypoglycemic event in the older adult with cognitive decline  · D/C SSI  · Continue Lispro 75/25 - 15 units in AM and 10 units in PM- will defer to Hospice for management after d/c from SNF        5  Chronic diastolic congestive heart failure [facility])  Assessment & Plan:  Wt Readings from Last 3 Encounters:   05/23/22 61 1 kg (134 lb 9 6 oz)   05/19/22 55 2 kg (121 lb 11 2 oz)   05/16/22 58 kg (127 lb 14 4 oz)     · Recent Echo done inpatient May 2022 - EF60%  wall motion normal, Moderate aortic valve stenoisi, mild mitral valve regurgitation, mild tricuspid valve regurgitation  · Not currently on diuretics were beta-blocker  · BP/heart rate stable  · Appears euvolemic on exam   · Monitor weights           6   Urinary tract infection without hematuria, site unspecified  Assessment & Plan:  · Treated inpatient with 3 days IV Rocephin and transitioned to PO cefpodoxime 100 mg BID x 4 days for total of 7 days  · Last dose  given 5/17  · No s/s of uti on exam          Follow-up Recommendations:     Outpatient Follow up with PCP in the next 2 weeks  [id number] and testing performed during stay:    Collection Date:05/16/2022 33:18  BASIC METABOLIC PNL  GLUCOSE 238 mg/dL 65-99 H Final  BUN 19 mg/dL 7-25 Final  CREATININE 0 80 mg/dL 0 40-1 10 Final  SODIUM 137 mmol/L 135-145 Final  POTASSIUM 4 6 mmol/L 3 5-5 2 Final  CHLORIDE 103 mmol/L 100-109 Final  CARBON DIOXIDE 26 mmol/L 23-31 Final  CALCIUM 9 0 mg/dL 8 5-10 1 Final  ANION GAP 8 3-11 Final  eGFRcr 70 >59 Final     Discharge Medications: See discharge medication list which was reviewed and signed  Current Outpatient Medications:     aspirin 81 mg chewable tablet, Chew 1 tablet (81 mg total)  in the morning , Disp: 30 tablet, Rfl: 0    atorvastatin (LIPITOR) 40 mg tablet, Take 1 tablet (40 mg total) by mouth every evening, Disp: 30 tablet, Rfl: 0    BD Insulin Syringe U/F 31G X 5/16" 0 5 ML MISC, inject under the skin twice daily, Disp: 200 each, Rfl: 3    insulin aspart protamine-insulin aspart (NovoLOG 70/30) 100 units/mL injection, Inject 10 Units under the skin every evening, Disp: 1 mL, Rfl: 0    insulin aspart protamine-insulin aspart (NovoLOG 70/30) 100 units/mL injection, Inject 15 Units under the skin every morning, Disp: 1 mL, Rfl: 0    insulin lispro (HumaLOG) 100 units/mL injection, Inject 1-5 Units under the skin in the morning and 1-5 Units at noon and 1-5 Units in the evening  Inject before meals  , Disp: 3 mL, Rfl: 0    Insulin Syringe-Needle U-100 (B-D INS SYRINGE 0 5CC/31GX5/16) 31G X 5/16" 0 5 ML MISC, by Does not apply route, Disp: , Rfl:     Lancets (FREESTYLE) lancets, by Does not apply route, Disp: , Rfl:      Discussion with patient/family and further instructions:  -Fall precautions  -Aspiration precautions  -Bleeding precautions  -Monitor for signs/symptoms of infection  -Medication list was reviewed and updated  Status at time of discharge: Stable    Plan discussed with Dr Bernardino Ta noted agreement with assessment and plan  Billing based on time  Time spent on unit, 40 minutes  Time spent counseling pt on debility/condition, 30 minutes  Please note:  Voice-recognition software may have been used in the preparation of this document  Occasional wrong word or "sound-alike" substitutions may have occurred due to the inherent limitations of voice recognition software  Interpretation should be guided by context          BRIAN Storm  5/23/2022

## 2022-05-25 ENCOUNTER — TRANSITIONAL CARE MANAGEMENT (OUTPATIENT)
Dept: INTERNAL MEDICINE CLINIC | Facility: CLINIC | Age: 87
End: 2022-05-25

## 2022-05-25 ENCOUNTER — TELEPHONE (OUTPATIENT)
Dept: INTERNAL MEDICINE CLINIC | Facility: CLINIC | Age: 87
End: 2022-05-25

## 2022-05-25 NOTE — TELEPHONE ENCOUNTER
----- Message from Abdulaziz Monroy sent at 5/25/2022  8:49 AM EDT -----  Regarding: TCM visit  Patient needing TCM follow-up:      Patient discharged from [address] [place] [address] to [place] on 5/12/22 with primary diagnosis of Cerebrovascular accident (CVA) due to stenosis of right posterior cerebral artery  Discharged from facility on 5/24/22 to home with home care  Please call patient to set up a TCM visit if you have not already done so  Thank you

## 2022-05-26 ENCOUNTER — PATIENT OUTREACH (OUTPATIENT)
Dept: CASE MANAGEMENT | Facility: HOSPITAL | Age: 87
End: 2022-05-26

## 2022-05-27 ENCOUNTER — PATIENT OUTREACH (OUTPATIENT)
Dept: INTERNAL MEDICINE CLINIC | Facility: CLINIC | Age: 87
End: 2022-05-27

## 2022-05-27 NOTE — PROGRESS NOTES
CM called [facility] to request larger diaper for patient per son's request   She reports agency already aware and sent larger diapers to start but son refused  She states she will update her notes and be sure the next person to visit has a variety of sizes available

## 2022-05-27 NOTE — PROGRESS NOTES
Inbasket notification received for hand off of patient enrolled in Stroke Medicare Bundle; discharged from [address] [place] [address] 5/24/22 to home with Hospice services provided by [facility]  Outside records through [employer] requested and refreshed  Chart review completed  Patient hospitalized   5/2/2022 - 5/12/2022 (10 days) for cerebrovascular accident due to stenosis of right posterior cerebral artery  Review of IP CM note indicates patient and son live in 2 story home, 1 step to enter  Prior to admission, patient required assistance with ADLs, used assistive devices with ambulation and family transported to appointments  DME in home:  [employer]  Past Medical History  DM2 (A1c of 8 2 drawn 5/3/22), CHF, Late onset Alzheimer's, right sided weakness and ambulatory dysfunction  See problem list for complete list of medical diagnosis  Future appointments:  6/8/2022 11:00 AM Appointment with Grace Barlow MD at [facility] ([phone])     Reminder sent to self with closure date of medicare bundle  CM confirmed with hospice agency that patient is on their service  Staff member reports patient initiated services 5/24/22  CM attempted to contact patient this afternoon but recorded message states"  Annabel vasquez but your call cannot be completed at this time  Please hang up and try your call again later "    CM contacted patient and spoke with son to introduce self, explain the role of the OP CM and purpose of this phone call is to assess patient's general wellbeing or for any assistance needed with follow-up care  BPCI-A program explained  Son consented to future outreach of CM  He reports dissatisfaction with skilled nursing facility  Son states patient often had food all over her gown, had bruises on her arms and sometimes wouldn't even have a gown on when he would go to visit     He further states he told the facility that patient would need to do a minimum of (3) steps or she wouldn't be able to return home and he states they sent her home "bascially as an invalid "  Son informed CM he has spoken already with 4 different people and replied "I need the supplies to be different than what is being given now "   He went on to report the mirtha lift is too large (he asked for a medium) and diapers, size medium, are too small (he asked for a large)  He reports the pull up diapers are easier to apply that ideally would be best         Just spoke with [facility] who will be coming tomorrow for visit at 0830  Son is able to care for patient on weekends  Unable to grasp with left hand  Eating well, high protein diet and feeding herself with minimal assistance  Only medication patient is taking is humalog per sliding scale he created  Son administers a rang from 15 units but no more than 50  While CM was on phone with son, someone arrived to swap out the sling for the mirtha lift  Son reports to take patient to PCP appointment as scheduled above  Son informed CM that number to call is [phone] and not the number ending in [address]  He reports he had to change his number and this is the fifth person he has told this to  CM removed number ending in [address] from patient demographics and replaced home number with [phone]  This  will continue to monitor through chart review, outreach and [place]

## 2022-06-08 ENCOUNTER — OFFICE VISIT (OUTPATIENT)
Dept: INTERNAL MEDICINE CLINIC | Facility: CLINIC | Age: 87
End: 2022-06-08
Payer: MEDICARE

## 2022-06-08 VITALS
TEMPERATURE: 97.3 F | HEART RATE: 55 BPM | DIASTOLIC BLOOD PRESSURE: 72 MMHG | SYSTOLIC BLOOD PRESSURE: 118 MMHG | RESPIRATION RATE: 16 BRPM | OXYGEN SATURATION: 98 %

## 2022-06-08 DIAGNOSIS — R52 PAIN: ICD-10-CM

## 2022-06-08 DIAGNOSIS — E11.8 TYPE 2 DIABETES MELLITUS WITH COMPLICATION, WITH LONG-TERM CURRENT USE OF INSULIN (HCC): Primary | ICD-10-CM

## 2022-06-08 DIAGNOSIS — N18.30 STAGE 3 CHRONIC KIDNEY DISEASE, UNSPECIFIED WHETHER STAGE 3A OR 3B CKD (HCC): ICD-10-CM

## 2022-06-08 DIAGNOSIS — L89.620 PRESSURE INJURY OF LEFT HEEL, UNSTAGEABLE (HCC): ICD-10-CM

## 2022-06-08 DIAGNOSIS — Z79.4 TYPE 2 DIABETES MELLITUS WITH COMPLICATION, WITH LONG-TERM CURRENT USE OF INSULIN (HCC): Primary | ICD-10-CM

## 2022-06-08 DIAGNOSIS — Z23 ENCOUNTER FOR IMMUNIZATION: ICD-10-CM

## 2022-06-08 PROCEDURE — 99213 OFFICE O/P EST LOW 20 MIN: CPT | Performed by: INTERNAL MEDICINE

## 2022-06-08 RX ORDER — GABAPENTIN 100 MG/1
100 CAPSULE ORAL 2 TIMES DAILY
Qty: 60 CAPSULE | Refills: 3 | Status: SHIPPED | OUTPATIENT
Start: 2022-06-08

## 2022-06-08 RX ORDER — HYOSCYAMINE SULFATE 0.12 MG/5ML
125 LIQUID ORAL EVERY 4 HOURS PRN
COMMUNITY

## 2022-06-08 RX ORDER — LORAZEPAM 0.5 MG/1
TABLET ORAL EVERY 8 HOURS PRN
COMMUNITY

## 2022-06-08 NOTE — PROGRESS NOTES
Assessment/Plan:       Diagnoses and all orders for this visit:    Type 2 diabetes mellitus with complication, with long-term current use of insulin (HCC)  -     Microalbumin / creatinine urine ratio ([facility]); Future  -     IRIS Diabetic eye exam    Encounter for immunization    Stage 3 chronic kidney disease, unspecified whether stage 3a or 3b CKD ([facility] [address] )    Pressure injury of left heel, unstageable (HCC)    Pain  -     gabapentin (Neurontin) 100 mg capsule; Take 1 capsule (100 mg total) by mouth 2 (two) times a day    Other orders  -     hyoscyamine (LEVSIN) 0 125 MG/5ML ELIX; Take 125 mcg by mouth every 4 (four) hours as needed for bladder spasms  -     LORazepam (ATIVAN) 0 5 mg tablet; Take by mouth every 8 (eight) hours as needed for anxiety                Subjective:      Patient ID: Michael Borjas is a 80 y o  female  Follow-up after discharge from hospital about 1 month ago for stroke  Here is to discharge summary:       " 80year-old  female with a PMH of T2DM, alzheimers, and CHF admitted with left sided weakness  and difficulty ambulating  Found to have acute CVA on her MRI brain  Person, patient started having symptoms a few days prior to ED arrival including  drowsiness, dizziness, and  left-sided weakness  Patient admitted on stroke pathway (including ASA, plavix for 3 weeks starting from 5/3/22 till 5/24/22 after which , plavix will be discontinued and ASA 81 mg po daily to be continued  ECHO, CTA head/neck, neuro checks, telemetry monitoring, statin,  MRI brain, neuro checks, PT/OT/ST and neurology consult) was initiated  CTH was unremarkable  CTA of the head and neck showed severe stenosis/occlusion of the right PCA, with 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  Labs revealed significant hyperlipidemia, and on examination patient had subtle left-sided weakness with neglect and field cut to suspect a right PCA infarct   MRI of the brain done subsequently confirms the presence of a right PCA infarct of recent duration, patient started on dual anti-platelet therapy, statin therapy  Blood pressure and sugar were controlled  PT/OT and speech were consulted and rehab was recommended  She was found to have UTI during her stay with positive UA and mild infectious encephalopathy secondary to UTI  Urine cx is pending  She has been given 3 days of IV Rocephin and will be continued even after dc is rehab for another 4 days for a total of 7 days  She was also found to have hyponatremia during her hospitalization  On exam today, VSS,  she is at her new baseline with left sided weakness and right gaze preference  Tolerated diet well and works with PT/OT  Will be discharged to [address] [place]  Family Mary Jane Salomon) informed and he is in agreement with the plan of care        Living at home with her son who is her caretaker  She is now enrolled in hospice and goal of care is patient comfort and alleviation  Her son is with her today  The patient cannot give me history  He reports that she is having pain arm and leg left side of body  The hospice company recommends tramadol but the son is afraid of side effects  We discussed this  I think that given her multiple comorbidities a therapeutic trial of gabapentin starting at a very low dose is warranted and I would recommend 100 mg twice a day  The patient has advanced Alzheimer's disease and is developing some behavioral issues  She was given some lorazepam   The son wonders what else can be done to alleviate the symptoms of agitation  The following portions of the patient's history were reviewed and updated as appropriate:   She has a past medical history of Aortic valve disorder, CHF (congestive heart failure) ([facility] [address] ), and Syncope ,  does not have any pertinent problems on file  ,   has a past surgical history that includes Bladder surgery; Mastectomy (Right);  Cataract extraction; and Total knee arthroplasty  ,  family history is not on file  ,   reports that she has never smoked  She has never used smokeless tobacco  She reports that she does not drink alcohol and does not use drugs  ,  is allergic to nitrofurantoin     Current Outpatient Medications   Medication Sig Dispense Refill    gabapentin (Neurontin) 100 mg capsule Take 1 capsule (100 mg total) by mouth 2 (two) times a day 60 capsule 3    hyoscyamine (LEVSIN) 0 125 MG/5ML ELIX Take 125 mcg by mouth every 4 (four) hours as needed for bladder spasms      insulin lispro (HumaLOG) 100 units/mL injection Inject 1-5 Units under the skin in the morning and 1-5 Units at noon and 1-5 Units in the evening  Inject before meals  3 mL 0    LORazepam (ATIVAN) 0 5 mg tablet Take by mouth every 8 (eight) hours as needed for anxiety      BD Insulin Syringe U/F 31G X 5/16" 0 5 ML MISC inject under the skin twice daily (Patient not taking: No sig reported) 200 each 3    insulin aspart protamine-insulin aspart (NovoLOG 70/30) 100 units/mL injection Inject 15 Units under the skin every morning (Patient not taking: No sig reported) 1 mL 0    Insulin Syringe-Needle U-100 31G X 5/16" 0 5 ML MISC by Does not apply route (Patient not taking: Reported on 6/8/2022)      Lancets (FREESTYLE) lancets by Does not apply route (Patient not taking: Reported on 6/8/2022)       No current facility-administered medications for this visit  Review of Systems   Unable to perform ROS: Dementia         Objective:  Vitals:    06/08/22 1130   BP: 118/72   Pulse: 55   Resp: 16   Temp: (!) 97 3 °F (36 3 °C)   SpO2: 98%      Physical Exam  Neurological:      Mental Status: She is alert  Psychiatric:         Attention and Perception: She is inattentive  Mood and Affect: Affect is flat  Behavior: Behavior is slowed and withdrawn  Cognition and Memory: Cognition is impaired  There are no Patient Instructions on file for this visit

## 2022-06-17 ENCOUNTER — PATIENT OUTREACH (OUTPATIENT)
Dept: INTERNAL MEDICINE CLINIC | Facility: CLINIC | Age: 87
End: 2022-06-17

## 2022-06-17 NOTE — PROGRESS NOTES
CM confirmed with hospice agency that patient is on their service  This CM will continue to monitor via chart review and hospice throughout bundle episode

## 2022-07-08 ENCOUNTER — PATIENT OUTREACH (OUTPATIENT)
Dept: INTERNAL MEDICINE CLINIC | Facility: CLINIC | Age: 87
End: 2022-07-08

## 2022-07-08 NOTE — PROGRESS NOTES
CM contacted hospice agency this morning  Staff member confirmed that patient is on their service  This CM will continue to monitor via chart review and hospice throughout bundle episode

## 2022-07-29 ENCOUNTER — PATIENT OUTREACH (OUTPATIENT)
Dept: INTERNAL MEDICINE CLINIC | Facility: CLINIC | Age: 87
End: 2022-07-29

## 2022-07-29 NOTE — PROGRESS NOTES
BPCI-A chart review completed  CM contacted hospice agency this afternoon  Staff member confirmed that patient remains on their service  This CM will continue to monitor via chart review and hospice agency throughout bundle episode

## 2022-08-10 ENCOUNTER — PATIENT OUTREACH (OUTPATIENT)
Dept: INTERNAL MEDICINE CLINIC | Facility: CLINIC | Age: 87
End: 2022-08-10

## 2022-08-10 NOTE — PROGRESS NOTES
Medicare Bundle episode ends today  Call placed to Hospice agency; staff member confirmed patient remains on service  Bundle episode resolved and care coordination note removed      CM removed self from care team

## 2022-10-11 PROBLEM — N39.0 UTI (URINARY TRACT INFECTION): Status: RESOLVED | Noted: 2022-05-10 | Resolved: 2022-10-11

## 2024-03-28 NOTE — ED NOTES
Patient remains in bed with lap belt in place, noted to be restless     Latasha Ocasio RN  05/03/22 8725 [Dysuria] : no dysuria [Incontinence] : no incontinence [Frequency] : no frequency [Hematuria] : no hematuria [Negative] : Gastrointestinal